# Patient Record
Sex: MALE | Race: WHITE | Employment: OTHER | URBAN - METROPOLITAN AREA
[De-identification: names, ages, dates, MRNs, and addresses within clinical notes are randomized per-mention and may not be internally consistent; named-entity substitution may affect disease eponyms.]

---

## 2020-07-26 ENCOUNTER — HOSPITAL ENCOUNTER (EMERGENCY)
Age: 75
Discharge: HOME | End: 2020-07-26
Attending: EMERGENCY MEDICINE
Payer: MEDICARE

## 2020-07-26 VITALS
HEART RATE: 92 BPM | SYSTOLIC BLOOD PRESSURE: 151 MMHG | RESPIRATION RATE: 18 BRPM | OXYGEN SATURATION: 96 % | TEMPERATURE: 98 F | DIASTOLIC BLOOD PRESSURE: 57 MMHG | WEIGHT: 254 LBS

## 2020-07-26 DIAGNOSIS — I15.9 SECONDARY HYPERTENSION: ICD-10-CM

## 2020-07-26 DIAGNOSIS — R73.9 HYPERGLYCEMIA: ICD-10-CM

## 2020-07-26 DIAGNOSIS — T78.3XXA ANGIOEDEMA, INITIAL ENCOUNTER: Primary | ICD-10-CM

## 2020-07-26 LAB
ALBUMIN SERPL-MCNC: 3.6 G/DL (ref 3.4–5)
ALBUMIN/GLOB SERPL: 0.8 {RATIO} (ref 1–2)
ALP LIVER SERPL-CCNC: 63 U/L (ref 45–117)
ALT SERPL-CCNC: 45 U/L (ref 16–61)
ANION GAP SERPL CALC-SCNC: 9 MMOL/L (ref 0–18)
AST SERPL-CCNC: 28 U/L (ref 15–37)
BASOPHILS # BLD AUTO: 0.04 X10(3) UL (ref 0–0.2)
BASOPHILS NFR BLD AUTO: 0.4 %
BILIRUB SERPL-MCNC: 0.8 MG/DL (ref 0.1–2)
BUN BLD-MCNC: 24 MG/DL (ref 7–18)
BUN/CREAT SERPL: 22.2 (ref 10–20)
CALCIUM BLD-MCNC: 9.2 MG/DL (ref 8.5–10.1)
CHLORIDE SERPL-SCNC: 105 MMOL/L (ref 98–112)
CO2 SERPL-SCNC: 22 MMOL/L (ref 21–32)
CREAT BLD-MCNC: 1.08 MG/DL (ref 0.7–1.3)
DEPRECATED RDW RBC AUTO: 46.9 FL (ref 35.1–46.3)
EOSINOPHIL # BLD AUTO: 0.2 X10(3) UL (ref 0–0.7)
EOSINOPHIL NFR BLD AUTO: 2 %
ERYTHROCYTE [DISTWIDTH] IN BLOOD BY AUTOMATED COUNT: 12.7 % (ref 11–15)
GLOBULIN PLAS-MCNC: 4.5 G/DL (ref 2.8–4.4)
GLUCOSE BLD-MCNC: 237 MG/DL (ref 70–99)
HCT VFR BLD AUTO: 43.4 % (ref 39–53)
HGB BLD-MCNC: 15.3 G/DL (ref 13–17.5)
IMM GRANULOCYTES # BLD AUTO: 0.01 X10(3) UL (ref 0–1)
IMM GRANULOCYTES NFR BLD: 0.1 %
LYMPHOCYTES # BLD AUTO: 2.94 X10(3) UL (ref 1–4)
LYMPHOCYTES NFR BLD AUTO: 29.4 %
M PROTEIN MFR SERPL ELPH: 8.1 G/DL (ref 6.4–8.2)
MCH RBC QN AUTO: 35 PG (ref 26–34)
MCHC RBC AUTO-ENTMCNC: 35.3 G/DL (ref 31–37)
MCV RBC AUTO: 99.3 FL (ref 80–100)
MONOCYTES # BLD AUTO: 0.92 X10(3) UL (ref 0.1–1)
MONOCYTES NFR BLD AUTO: 9.2 %
NEUTROPHILS # BLD AUTO: 5.88 X10 (3) UL (ref 1.5–7.7)
NEUTROPHILS # BLD AUTO: 5.88 X10(3) UL (ref 1.5–7.7)
NEUTROPHILS NFR BLD AUTO: 58.9 %
OSMOLALITY SERPL CALC.SUM OF ELEC: 294 MOSM/KG (ref 275–295)
PLATELET # BLD AUTO: 170 10(3)UL (ref 150–450)
POTASSIUM SERPL-SCNC: 3.7 MMOL/L (ref 3.5–5.1)
RBC # BLD AUTO: 4.37 X10(6)UL (ref 3.8–5.8)
SODIUM SERPL-SCNC: 136 MMOL/L (ref 136–145)
WBC # BLD AUTO: 10 X10(3) UL (ref 4–11)

## 2020-07-26 PROCEDURE — 93010 ELECTROCARDIOGRAM REPORT: CPT

## 2020-07-26 PROCEDURE — 93005 ELECTROCARDIOGRAM TRACING: CPT

## 2020-07-26 PROCEDURE — 99284 EMERGENCY DEPT VISIT MOD MDM: CPT

## 2020-07-26 PROCEDURE — 80053 COMPREHEN METABOLIC PANEL: CPT | Performed by: EMERGENCY MEDICINE

## 2020-07-26 PROCEDURE — 96375 TX/PRO/DX INJ NEW DRUG ADDON: CPT

## 2020-07-26 PROCEDURE — 85025 COMPLETE CBC W/AUTO DIFF WBC: CPT | Performed by: EMERGENCY MEDICINE

## 2020-07-26 PROCEDURE — 99285 EMERGENCY DEPT VISIT HI MDM: CPT

## 2020-07-26 PROCEDURE — 96374 THER/PROPH/DIAG INJ IV PUSH: CPT

## 2020-07-26 PROCEDURE — S0028 INJECTION, FAMOTIDINE, 20 MG: HCPCS | Performed by: EMERGENCY MEDICINE

## 2020-07-26 RX ORDER — EPINEPHRINE 0.3 MG/.3ML
0.3 INJECTION SUBCUTANEOUS
Qty: 1 EACH | Refills: 0 | Status: SHIPPED | OUTPATIENT
Start: 2020-07-26 | End: 2020-08-25

## 2020-07-26 RX ORDER — METHYLPREDNISOLONE SODIUM SUCCINATE 125 MG/2ML
125 INJECTION, POWDER, LYOPHILIZED, FOR SOLUTION INTRAMUSCULAR; INTRAVENOUS ONCE
Status: COMPLETED | OUTPATIENT
Start: 2020-07-26 | End: 2020-07-26

## 2020-07-26 RX ORDER — HYDRALAZINE HYDROCHLORIDE 20 MG/ML
10 INJECTION INTRAMUSCULAR; INTRAVENOUS ONCE
Status: COMPLETED | OUTPATIENT
Start: 2020-07-26 | End: 2020-07-26

## 2020-07-26 RX ORDER — PREDNISONE 20 MG/1
60 TABLET ORAL DAILY
Qty: 15 TABLET | Refills: 0 | Status: SHIPPED | OUTPATIENT
Start: 2020-07-26 | End: 2020-07-31

## 2020-07-26 RX ORDER — HYDRALAZINE HYDROCHLORIDE 25 MG/1
25 TABLET, FILM COATED ORAL 3 TIMES DAILY
Qty: 270 TABLET | Refills: 0 | Status: SHIPPED | OUTPATIENT
Start: 2020-07-26 | End: 2020-10-24

## 2020-07-26 RX ORDER — FAMOTIDINE 20 MG/1
20 TABLET ORAL 2 TIMES DAILY PRN
Qty: 30 TABLET | Refills: 0 | Status: SHIPPED | OUTPATIENT
Start: 2020-07-26 | End: 2020-08-25

## 2020-07-26 RX ORDER — DIPHENHYDRAMINE HYDROCHLORIDE 50 MG/ML
25 INJECTION INTRAMUSCULAR; INTRAVENOUS ONCE
Status: COMPLETED | OUTPATIENT
Start: 2020-07-26 | End: 2020-07-26

## 2020-07-26 RX ORDER — FAMOTIDINE 10 MG/ML
20 INJECTION, SOLUTION INTRAVENOUS ONCE
Status: COMPLETED | OUTPATIENT
Start: 2020-07-26 | End: 2020-07-26

## 2020-07-26 NOTE — ED PROVIDER NOTES
Patient Seen in: THE [facility] Emergency Department In [place]      History   Patient presents with:  Cellulitis    Stated Complaint: upper lip swelling since noon    HPI    This is a 50-year-old male who arrives here with complaints of lip swelling the symp 151/57   Pulse 92   Temp 98.1 °F (36.7 °C) (Tympanic)   Resp 18   Wt 115.2 kg   SpO2 96%         Physical Exam    General: . Patient is in no respiratory distress. He has no stridor no tongue swelling or lip swelling.   The patient is in no respiratory di orders. The patient was placed on monitors, IV was started, blood was drawn. MDM     The EKG shows normal sinus rhythm. There is no acute ST elevations or ischemic findings.   The rest of the EKG including rate rhythm axis and inte if it still persistently high to go to 3 times daily. I discussed with him that if his blood pressures are elevated then to use the metformin. I have also discussed this case with his own primary care physician Zulema Monteiro.   They will follow him up for cl

## 2020-07-26 NOTE — ED INITIAL ASSESSMENT (HPI)
Pt here c/o upper lip swelling since around 1100 today. States only ate a banana this morning and took morning meds. Pt talking without difficulty. Denies sore throat.

## 2020-07-27 LAB
ATRIAL RATE: 93 BPM
P AXIS: 51 DEGREES
P-R INTERVAL: 230 MS
Q-T INTERVAL: 380 MS
QRS DURATION: 100 MS
QTC CALCULATION (BEZET): 472 MS
R AXIS: -12 DEGREES
T AXIS: 32 DEGREES
VENTRICULAR RATE: 93 BPM

## 2021-09-15 ENCOUNTER — APPOINTMENT (OUTPATIENT)
Dept: CT IMAGING | Age: 76
DRG: 689 | End: 2021-09-15
Attending: EMERGENCY MEDICINE
Payer: MEDICARE

## 2021-09-15 ENCOUNTER — HOSPITAL ENCOUNTER (INPATIENT)
Facility: HOSPITAL | Age: 76
LOS: 1 days | Discharge: HOME | DRG: 689 | End: 2021-09-17
Attending: EMERGENCY MEDICINE | Admitting: INTERNAL MEDICINE
Payer: MEDICARE

## 2021-09-15 ENCOUNTER — HOSPITAL ENCOUNTER (EMERGENCY)
Facility: HOSPITAL | Age: 76
Discharge: LEFT BEFORE DISCHARGE | End: 2021-09-15
Payer: MEDICARE

## 2021-09-15 DIAGNOSIS — R41.82 ALTERED MENTAL STATUS, UNSPECIFIED ALTERED MENTAL STATUS TYPE: ICD-10-CM

## 2021-09-15 DIAGNOSIS — N39.0 URINARY TRACT INFECTION WITHOUT HEMATURIA, SITE UNSPECIFIED: Primary | ICD-10-CM

## 2021-09-15 PROBLEM — R79.89 AZOTEMIA: Status: ACTIVE | Noted: 2021-09-15

## 2021-09-15 PROBLEM — E87.1 HYPONATREMIA: Status: ACTIVE | Noted: 2021-09-15

## 2021-09-15 PROBLEM — R73.9 HYPERGLYCEMIA: Status: ACTIVE | Noted: 2021-09-15

## 2021-09-15 LAB
ALBUMIN SERPL-MCNC: 3.8 G/DL (ref 3.4–5)
ALBUMIN/GLOB SERPL: 0.7 {RATIO} (ref 1–2)
ALP LIVER SERPL-CCNC: 71 U/L
ALT SERPL-CCNC: 31 U/L
AMPHET UR QL SCN: NEGATIVE
ANION GAP SERPL CALC-SCNC: 7 MMOL/L (ref 0–18)
APAP SERPL-MCNC: <2 UG/ML (ref 10–30)
AST SERPL-CCNC: 28 U/L (ref 15–37)
BASOPHILS # BLD AUTO: 0.05 X10(3) UL (ref 0–0.2)
BASOPHILS NFR BLD AUTO: 0.4 %
BENZODIAZ UR QL SCN: NEGATIVE
BILIRUB SERPL-MCNC: 0.5 MG/DL (ref 0.1–2)
BILIRUB UR QL STRIP.AUTO: NEGATIVE
BUN BLD-MCNC: 28 MG/DL (ref 7–18)
CALCIUM BLD-MCNC: 9.6 MG/DL (ref 8.5–10.1)
CANNABINOIDS UR QL SCN: NEGATIVE
CHLORIDE SERPL-SCNC: 102 MMOL/L (ref 98–112)
CO2 SERPL-SCNC: 26 MMOL/L (ref 21–32)
COCAINE UR QL: NEGATIVE
COLOR UR AUTO: YELLOW
CREAT BLD-MCNC: 1.2 MG/DL
CREAT UR-SCNC: 141 MG/DL
EOSINOPHIL # BLD AUTO: 0.18 X10(3) UL (ref 0–0.7)
EOSINOPHIL NFR BLD AUTO: 1.3 %
ERYTHROCYTE [DISTWIDTH] IN BLOOD BY AUTOMATED COUNT: 12.5 %
ETHANOL SERPL-MCNC: <3 MG/DL (ref ?–3)
GLOBULIN PLAS-MCNC: 5.1 G/DL (ref 2.8–4.4)
GLUCOSE BLD-MCNC: 158 MG/DL (ref 70–99)
GLUCOSE UR STRIP.AUTO-MCNC: NEGATIVE MG/DL
HCT VFR BLD AUTO: 44.2 %
HGB BLD-MCNC: 15 G/DL
IMM GRANULOCYTES # BLD AUTO: 0.03 X10(3) UL (ref 0–1)
IMM GRANULOCYTES NFR BLD: 0.2 %
KETONES UR STRIP.AUTO-MCNC: 15 MG/DL
LYMPHOCYTES # BLD AUTO: 3.8 X10(3) UL (ref 1–4)
LYMPHOCYTES NFR BLD AUTO: 28.4 %
MCH RBC QN AUTO: 33.4 PG (ref 26–34)
MCHC RBC AUTO-ENTMCNC: 33.9 G/DL (ref 31–37)
MCV RBC AUTO: 98.4 FL
MDMA UR QL SCN: NEGATIVE
MONOCYTES # BLD AUTO: 1.11 X10(3) UL (ref 0.1–1)
MONOCYTES NFR BLD AUTO: 8.3 %
NEUTROPHILS # BLD AUTO: 8.23 X10 (3) UL (ref 1.5–7.7)
NEUTROPHILS # BLD AUTO: 8.23 X10(3) UL (ref 1.5–7.7)
NEUTROPHILS NFR BLD AUTO: 61.4 %
NITRITE UR QL STRIP.AUTO: NEGATIVE
OPIATES UR QL SCN: NEGATIVE
OSMOLALITY SERPL CALC.SUM OF ELEC: 289 MOSM/KG (ref 275–295)
PH UR STRIP.AUTO: 5.5 [PH] (ref 5–8)
PLATELET # BLD AUTO: 274 10(3)UL (ref 150–450)
POTASSIUM SERPL-SCNC: 3.8 MMOL/L (ref 3.5–5.1)
PROT SERPL-MCNC: 8.9 G/DL (ref 6.4–8.2)
RBC # BLD AUTO: 4.49 X10(6)UL
SALICYLATES SERPL-MCNC: <1.7 MG/DL (ref 2.8–20)
SARS-COV-2 RNA RESP QL NAA+PROBE: NOT DETECTED
SODIUM SERPL-SCNC: 135 MMOL/L (ref 136–145)
SP GR UR STRIP.AUTO: 1.02 (ref 1–1.03)
TSI SER-ACNC: 1.23 MIU/ML (ref 0.36–3.74)
UROBILINOGEN UR STRIP.AUTO-MCNC: 0.2 MG/DL
WBC # BLD AUTO: 13.4 X10(3) UL (ref 4–11)
WBC #/AREA URNS AUTO: >50 /HPF

## 2021-09-15 PROCEDURE — 99223 1ST HOSP IP/OBS HIGH 75: CPT | Performed by: INTERNAL MEDICINE

## 2021-09-15 PROCEDURE — 70450 CT HEAD/BRAIN W/O DYE: CPT | Performed by: EMERGENCY MEDICINE

## 2021-09-15 RX ORDER — LABETALOL HYDROCHLORIDE 5 MG/ML
10 INJECTION, SOLUTION INTRAVENOUS ONCE
Status: COMPLETED | OUTPATIENT
Start: 2021-09-15 | End: 2021-09-15

## 2021-09-15 RX ORDER — LABETALOL HYDROCHLORIDE 5 MG/ML
15 INJECTION, SOLUTION INTRAVENOUS ONCE
Status: COMPLETED | OUTPATIENT
Start: 2021-09-15 | End: 2021-09-15

## 2021-09-15 NOTE — ED INITIAL ASSESSMENT (HPI)
Family states has been confused off and on for last three weeks. He is alert and oriented to name and date but confused on address and thinks he lives in [place] where he lived as a young man. Also complains of \"cat spying on him\" denies headache.  Wife

## 2021-09-16 ENCOUNTER — APPOINTMENT (OUTPATIENT)
Dept: CV DIAGNOSTICS | Facility: HOSPITAL | Age: 76
DRG: 689 | End: 2021-09-16
Attending: INTERNAL MEDICINE
Payer: MEDICARE

## 2021-09-16 LAB
ANION GAP SERPL CALC-SCNC: 6 MMOL/L (ref 0–18)
BASOPHILS # BLD AUTO: 0.06 X10(3) UL (ref 0–0.2)
BASOPHILS NFR BLD AUTO: 0.4 %
BUN BLD-MCNC: 26 MG/DL (ref 7–18)
CALCIUM BLD-MCNC: 9.3 MG/DL (ref 8.5–10.1)
CHLORIDE SERPL-SCNC: 107 MMOL/L (ref 98–112)
CO2 SERPL-SCNC: 26 MMOL/L (ref 21–32)
CREAT BLD-MCNC: 1.45 MG/DL
EOSINOPHIL # BLD AUTO: 0.06 X10(3) UL (ref 0–0.7)
EOSINOPHIL NFR BLD AUTO: 0.4 %
ERYTHROCYTE [DISTWIDTH] IN BLOOD BY AUTOMATED COUNT: 12.4 %
EST. AVERAGE GLUCOSE BLD GHB EST-MCNC: 151 MG/DL (ref 68–126)
GLUCOSE BLD-MCNC: 128 MG/DL (ref 70–99)
GLUCOSE BLD-MCNC: 142 MG/DL (ref 70–99)
GLUCOSE BLD-MCNC: 149 MG/DL (ref 70–99)
GLUCOSE BLD-MCNC: 149 MG/DL (ref 70–99)
GLUCOSE BLD-MCNC: 172 MG/DL (ref 70–99)
GLUCOSE BLD-MCNC: 193 MG/DL (ref 70–99)
HBA1C MFR BLD HPLC: 6.9 % (ref ?–5.7)
HCT VFR BLD AUTO: 38.4 %
HGB BLD-MCNC: 13.1 G/DL
IMM GRANULOCYTES # BLD AUTO: 0.06 X10(3) UL (ref 0–1)
IMM GRANULOCYTES NFR BLD: 0.4 %
LYMPHOCYTES # BLD AUTO: 1.79 X10(3) UL (ref 1–4)
LYMPHOCYTES NFR BLD AUTO: 12.3 %
MCH RBC QN AUTO: 33.5 PG (ref 26–34)
MCHC RBC AUTO-ENTMCNC: 34.1 G/DL (ref 31–37)
MCV RBC AUTO: 98.2 FL
MONOCYTES # BLD AUTO: 1 X10(3) UL (ref 0.1–1)
MONOCYTES NFR BLD AUTO: 6.9 %
NEUTROPHILS # BLD AUTO: 11.55 X10 (3) UL (ref 1.5–7.7)
NEUTROPHILS # BLD AUTO: 11.55 X10(3) UL (ref 1.5–7.7)
NEUTROPHILS NFR BLD AUTO: 79.6 %
OSMOLALITY SERPL CALC.SUM OF ELEC: 297 MOSM/KG (ref 275–295)
PLATELET # BLD AUTO: 263 10(3)UL (ref 150–450)
POTASSIUM SERPL-SCNC: 3.7 MMOL/L (ref 3.5–5.1)
RBC # BLD AUTO: 3.91 X10(6)UL
SODIUM SERPL-SCNC: 139 MMOL/L (ref 136–145)
WBC # BLD AUTO: 14.5 X10(3) UL (ref 4–11)

## 2021-09-16 PROCEDURE — 93306 TTE W/DOPPLER COMPLETE: CPT | Performed by: INTERNAL MEDICINE

## 2021-09-16 PROCEDURE — 99232 SBSQ HOSP IP/OBS MODERATE 35: CPT | Performed by: HOSPITALIST

## 2021-09-16 RX ORDER — LORAZEPAM 1 MG/1
1 TABLET ORAL
Status: DISCONTINUED | OUTPATIENT
Start: 2021-09-16 | End: 2021-09-17

## 2021-09-16 RX ORDER — METOCLOPRAMIDE HYDROCHLORIDE 5 MG/ML
10 INJECTION INTRAMUSCULAR; INTRAVENOUS EVERY 8 HOURS PRN
Status: DISCONTINUED | OUTPATIENT
Start: 2021-09-16 | End: 2021-09-17

## 2021-09-16 RX ORDER — ACETAMINOPHEN 325 MG/1
650 TABLET ORAL EVERY 6 HOURS PRN
Status: DISCONTINUED | OUTPATIENT
Start: 2021-09-16 | End: 2021-09-17

## 2021-09-16 RX ORDER — LISINOPRIL 10 MG/1
10 TABLET ORAL DAILY
Status: DISCONTINUED | OUTPATIENT
Start: 2021-09-16 | End: 2021-09-17

## 2021-09-16 RX ORDER — MELATONIN
3 NIGHTLY PRN
Status: DISCONTINUED | OUTPATIENT
Start: 2021-09-16 | End: 2021-09-17

## 2021-09-16 RX ORDER — ONDANSETRON 2 MG/ML
4 INJECTION INTRAMUSCULAR; INTRAVENOUS EVERY 6 HOURS PRN
Status: DISCONTINUED | OUTPATIENT
Start: 2021-09-16 | End: 2021-09-17

## 2021-09-16 RX ORDER — FOLIC ACID 1 MG/1
1 TABLET ORAL DAILY
Status: DISCONTINUED | OUTPATIENT
Start: 2021-09-16 | End: 2021-09-17

## 2021-09-16 RX ORDER — POLYETHYLENE GLYCOL 3350 17 G/17G
17 POWDER, FOR SOLUTION ORAL DAILY PRN
Status: DISCONTINUED | OUTPATIENT
Start: 2021-09-16 | End: 2021-09-17

## 2021-09-16 RX ORDER — LORAZEPAM 2 MG/ML
1 INJECTION INTRAMUSCULAR
Status: DISCONTINUED | OUTPATIENT
Start: 2021-09-16 | End: 2021-09-17

## 2021-09-16 RX ORDER — LORAZEPAM 1 MG/1
2 TABLET ORAL
Status: DISCONTINUED | OUTPATIENT
Start: 2021-09-16 | End: 2021-09-17

## 2021-09-16 RX ORDER — HYDRALAZINE HYDROCHLORIDE 20 MG/ML
10 INJECTION INTRAMUSCULAR; INTRAVENOUS EVERY 4 HOURS PRN
Status: DISCONTINUED | OUTPATIENT
Start: 2021-09-16 | End: 2021-09-17

## 2021-09-16 RX ORDER — MELATONIN
100 DAILY
Status: DISCONTINUED | OUTPATIENT
Start: 2021-09-17 | End: 2021-09-17

## 2021-09-16 RX ORDER — MULTIPLE VITAMINS W/ MINERALS TAB 9MG-400MCG
1 TAB ORAL DAILY
Status: DISCONTINUED | OUTPATIENT
Start: 2021-09-16 | End: 2021-09-17

## 2021-09-16 RX ORDER — LORAZEPAM 2 MG/ML
2 INJECTION INTRAMUSCULAR
Status: DISCONTINUED | OUTPATIENT
Start: 2021-09-16 | End: 2021-09-17

## 2021-09-16 RX ORDER — DEXTROSE MONOHYDRATE 25 G/50ML
50 INJECTION, SOLUTION INTRAVENOUS
Status: DISCONTINUED | OUTPATIENT
Start: 2021-09-16 | End: 2021-09-17

## 2021-09-16 RX ORDER — ENOXAPARIN SODIUM 100 MG/ML
40 INJECTION SUBCUTANEOUS DAILY
Status: DISCONTINUED | OUTPATIENT
Start: 2021-09-16 | End: 2021-09-17

## 2021-09-16 RX ORDER — LABETALOL HYDROCHLORIDE 5 MG/ML
20 INJECTION, SOLUTION INTRAVENOUS EVERY 4 HOURS PRN
Status: DISCONTINUED | OUTPATIENT
Start: 2021-09-16 | End: 2021-09-17

## 2021-09-16 NOTE — CM/SW NOTE
MSW, Domonique Figueredo and RN discussed patient's post d/c needs in care rounds. No identified needs at this time, MSW will remain available should needs change.     POC: CIWA  Supportive son

## 2021-09-16 NOTE — H&P
[facility]ITAL[facility]  History and Physical     Julio César Zapata Patient Status:  Observation    1945 MRN [id number]   [facility] 3NE-A Attending Chiqui Rico DO;Rick,*   Hosp Day # 0 PCP Brain Holloway DO     Chief Complaint: AMS    His acute distress. Awake and alert  HEENT: Normocephalic atraumatic. Moist mucous membranes. EOM-I. PERRLA. Anicteric. Neck: Trachea midline. No JVD. No carotid bruits. Respiratory: Clear to auscultation bilaterally. No wheezes. No rhonchi.   Cardiovascular: daily  2. PRN hydralazine and labetalol  4. Murmur  1. Check 2D echo  5. DM type II  1. Check A1c  2. ISS  6. Mild hyponatremia  1. Monitor  7. ETOH abuse per family  1. Last drink unknown  2. Urine tox screen  3. ETOH level  4.  CIWA for now    Quality:  ·

## 2021-09-16 NOTE — PROGRESS NOTES
[facility]     Hospitalist Progress Note     Juana Mckeon Patient Status:  Inpatient    1945 MRN [id number]   [facility] 3NE-A Attending Ria Singer MD   Hosp Day # 0 PCP Blade Nance DO     Chief Complaint: confusion    Sub Markers  No results for input(s): CRP, ANITA, LDH, DDIMER in the last 168 hours. Imaging: Imaging data reviewed in Epic.     Medications:   • lisinopril  10 mg Oral Daily   • cefTRIAXone  1 g Intravenous Q24H   • Insulin Aspart Pen  1-5 Units Subcutaneous

## 2021-09-16 NOTE — PLAN OF CARE
Alert to self, vitals stable, on room air, no SOB, NSR, ST on tele, afibrile, denies having pain. CIWA protocol, PO avitan given per MAR. Gets agitated at times, demands the staff to take of the equipment, redirections provided.  Family is at the bedsid and sodium.  Initiate appropriate interventions as ordered  Outcome: Progressing     Problem: RISK FOR INFECTION - ADULT  Goal: Absence of fever/infection during anticipated neutropenic period  Description: INTERVENTIONS  - Monitor WBC  - Administer growth

## 2021-09-16 NOTE — PLAN OF CARE
PT ALERT, ORIENTED TO SELF, DISORIENTED TO SITUATION, PLACE, RESTLESS, AGITATED AT TIMES RAMBLING, PARANOID, 98% ON RA, /102, DR. BLACKBURN AWARE, MEDS ORDERED, GIVEN HYDRALAZINE, LISINOPRIL, 1 HOUR LATER, BP-184/83, GIVEN LABETALOL, BP - 102/52, VOIDED

## 2021-09-16 NOTE — BH PROGRESS NOTE
Drake , Adria Machado was going to see the pt but not able to due to ams per his nurse. Teresa Lima or Alejandra will check on him another time.

## 2021-09-16 NOTE — ED PROVIDER NOTES
Patient Seen in: THE [facility] Emergency Department In [place]      History   Patient presents with:  Altered Mental Status    Stated Complaint: CONFUSION, PARANOIA,     Subjective:   HPI    25-year-old man from home history of diabetes hypertension, here fo Temp 97.3 °F (36.3 °C) (Temporal)   Resp 17   Ht 172.7 cm (5' 8\")   Wt 106.6 kg   SpO2 98%   BMI 35.73 kg/m²         Physical Exam        Physical Exam  Vitals signs and nursing note reviewed.    General: Older gentleman sitting in the bed no acute distres components:    Salicylate <5.1 (*)     All other components within normal limits   CBC W/ DIFFERENTIAL - Abnormal; Notable for the following components:    WBC 13.4 (*)     Neutrophil Absolute Prelim 8.23 (*)     Neutrophil Absolute 8.23 (*)     Monocyte A are no abnormal extraaxial fluid collections. There is no midline shift. There are no acute appearing intraparenchymal brain abnormalities. There is nothing specific for acute territorial infarct. There is no hemorrhage or mass lesion.   SINUSES:  There

## 2021-09-17 VITALS
SYSTOLIC BLOOD PRESSURE: 152 MMHG | HEIGHT: 68 IN | OXYGEN SATURATION: 98 % | BODY MASS INDEX: 32.53 KG/M2 | RESPIRATION RATE: 18 BRPM | HEART RATE: 84 BPM | WEIGHT: 214.63 LBS | DIASTOLIC BLOOD PRESSURE: 64 MMHG | TEMPERATURE: 98 F

## 2021-09-17 LAB
ATRIAL RATE: 98 BPM
GLUCOSE BLD-MCNC: 126 MG/DL (ref 70–99)
GLUCOSE BLD-MCNC: 149 MG/DL (ref 70–99)
P AXIS: 58 DEGREES
P-R INTERVAL: 224 MS
Q-T INTERVAL: 364 MS
QRS DURATION: 92 MS
QTC CALCULATION (BEZET): 464 MS
R AXIS: -12 DEGREES
T AXIS: 93 DEGREES
VENTRICULAR RATE: 98 BPM

## 2021-09-17 PROCEDURE — 99239 HOSP IP/OBS DSCHRG MGMT >30: CPT | Performed by: HOSPITALIST

## 2021-09-17 RX ORDER — CEPHALEXIN 500 MG/1
500 CAPSULE ORAL 4 TIMES DAILY
Qty: 20 CAPSULE | Refills: 0 | Status: SHIPPED | OUTPATIENT
Start: 2021-09-17 | End: 2021-09-17

## 2021-09-17 RX ORDER — CEPHALEXIN 500 MG/1
500 CAPSULE ORAL 3 TIMES DAILY
Qty: 15 CAPSULE | Refills: 0 | Status: SHIPPED | OUTPATIENT
Start: 2021-09-17 | End: 2021-09-22

## 2021-09-17 RX ORDER — LISINOPRIL 10 MG/1
10 TABLET ORAL DAILY
Qty: 30 TABLET | Refills: 0 | Status: SHIPPED | OUTPATIENT
Start: 2021-09-18 | End: 2021-10-18

## 2021-09-17 NOTE — PLAN OF CARE
Patient oriented x2-3, forgetful   Able to communicate needs   Placed o2 2lnc for low sats when asleep   sr on tele  On CIWA protocol   Safety precautions in place  Call light is in reach     Problem: Patient/Family Goals  Goal: Patient/Family Long Term Go

## 2021-09-17 NOTE — PROGRESS NOTES
[facility]     Hospitalist Progress Note     Yohan Pac Patient Status:  Inpatient    1945 MRN [id number]   [facility] 3NE-A Attending Fely Martin MD   Hosp Day # 1 PCP William Barrientos DO     Chief Complaint: confusion    Sub Epic.    Medications:   • lisinopril  10 mg Oral Daily   • cefTRIAXone  1 g Intravenous Q24H   • Insulin Aspart Pen  1-5 Units Subcutaneous TID CC and HS   • enoxaparin  40 mg Subcutaneous Daily   • thiamine  100 mg Oral Daily   • multivitamin with mineral

## 2021-09-17 NOTE — CM/SW NOTE
CM received a call from RN stating that daughter that is out of state is disputing discharge. CM called son that is here to  patient. Son is now questioning if he should take pt home. RN to call Dr. Es Ferrera to discuss discharge planning.  If MD is st

## 2021-09-17 NOTE — PHYSICAL THERAPY NOTE
PHYSICAL THERAPY QUICK EVALUATION - INPATIENT    Room Number: 0632/3598-M  Evaluation Date: 9/17/2021  Presenting Problem: UTI  Physician Order: PT Eval and Treat     RAPID-SARS (-) 9/15/221    CT of brain 9/15/21-CONCLUSION:  Atrophy.   No acute bleed or patient currently have. ..  -   Turning over in bed (including adjusting bedclothes, sheets and blankets)?: None   -   Sitting down on and standing up from a chair with arms (e.g., wheelchair, bedside commode, etc.): None   -   Moving from lying on back to and ETOH abused. Functional outcome measures completed include The AM-PAC '6-Clicks' Inpatient Basic Mobility Short Form was completed and this patient is demonstrating a 28.97% degree of impairment in mobility.  Based on this evaluation, patient's clinica

## 2021-09-17 NOTE — CDS QUERY
Confusion/Delirium Without History of Injury  CLINICAL Brigitte [place]  Dear Doctor:  Clinical information (provided below) indicates confusion and/or delirium without history of injury.  For accurate ICD-10-CM code assignment to reflect se

## 2021-09-17 NOTE — BH PROGRESS NOTE
Josi Rob met with pt to discuss alcohol use and possible treatment options. Pt denies \"drinking problem\" and states that he is not interested in discussion or options regarding treatment.       note scanned into p

## 2021-09-17 NOTE — PROGRESS NOTES
This RN spoke with patient's daughter Beth Barreto regarding patient's discharge as she felt her father is being discharged too early due to his confusion s/t UTI.  This RN explained to the daughter that elderly patients diagnosed with UTI may experience symptpms

## 2021-09-17 NOTE — OCCUPATIONAL THERAPY NOTE
OCCUPATIONAL THERAPY QUICK EVALUATION - INPATIENT    Room Number: 8487/9277-U  Evaluation Date: 9/17/2021     Type of Evaluation: Quick Eval  Presenting Problem: UTI, AMS    Physician Order: IP Consult to Occupational Therapy  Reason for Therapy:  ADL/IADL ASSESSMENT  AM-PAC ‘6-Clicks’ Inpatient Daily Activity Short Form   How much help from another person does the patient currently need…  -   Putting on and taking off regular lower body clothing?: None   -   Bathing (including washing, rinsing, drying)?: A LOW  1 - 3 performance deficits    Client Assessment/Performance Deficits  MODERATE - Comorbidities and min to mod modifications of tasks    Clinical Decision Making  LOW - Analysis of occupational profile, problem-focused assessments, limited treatment op

## 2021-09-17 NOTE — PLAN OF CARE
A/O x 2-3, confused at times, vitals stable, no SOB  NSR on tele, afebrile, denies having pain   No behavioral concerns, the patient is cooperative with care today. PT/OT eval completed, see notes.   CIWA protocol, seizure precautions  POC updates discuss of fever/infection during anticipated neutropenic period  Description: INTERVENTIONS  - Monitor WBC  - Administer growth factors as ordered  - Implement neutropenic guidelines  Outcome: Progressing     Problem: SAFETY ADULT - FALL  Goal: Free from fall inj Administer ordered medications to maintain glucose within target range  - Assess barriers to adequate nutritional intake and initiate nutrition consult as needed  - Instruct patient on self management of diabetes  Outcome: Progressing

## 2021-09-17 NOTE — PAYOR COMM NOTE
STARTED OUT OBSERVATION ON 9/15  MADE INPT 9/17    ADMISSION REVIEW     Payor: Orlin Walter #:  [id number]  Authorization Number: [id number]    Admit date: 9/16/21  Admit time:  2:29 PM       REVIEW DOCUMENTATION:     ED Provider Notes      E Used    Alcohol use: Yes      Comment: every day    Drug use: Never             Review of Systems    Positive for stated complaint: CONFUSION, PARANOIA,   Other systems are as noted in HPI. Constitutional and vital signs reviewed.       All other systems r within normal limits   COMP METABOLIC PANEL (14) - Abnormal; Notable for the following components:    Glucose 158 (*)     Sodium 135 (*)     BUN 28 (*)     GFR, Non- 58 (*)     Total Protein 8.9 (*)     Globulin  5.1 (*)     A/G Ratio 0.7 ( PARANOIA,  TECHNIQUE:  Noncontrast CT scanning is performed through the brain. Dose reduction techniques were used.  Dose information is transmitted to the [facility]) Lorena [address] ([address]) which includes the Dose I specified.         Medications Prescribed:  Current Discharge Medication List                          Hospital Problems             Present on Admission  Date Reviewed: 9/15/2021          ICD-10-CM Noted POA    * (Principal) Urinary tract infection without quit smoking about 31 years ago. His smoking use included cigarettes. He has never used smokeless tobacco. He reports current alcohol use. He reports that he does not use drugs. Family History: Reviewed. No pertinent family history.     Allergies: No Kno on SCr of 1.2 mg/dL). No results for input(s): PTP, INR in the last 168 hours.     COVID-19 Lab Results  COVID-19  Lab Results   Component Value Date    COVID19 Not Detected 09/15/2021     Pro-Calcitonin  No results for input(s): PCT in the last 168 hour Oral Kojo Tejeda RN      LORazepam (ATIVAN) tab 1 mg     Date Action Dose Route User    9/16/2021 1430 Given  Oral Kojo Tejeda RN    9/16/2021 1152 Given  Oral Jaime Lorenzo RN      LORazepam (ATIVAN) injection 1 mg     Date Action Dose 09/16/21 0400 — 72 — 93/52 — — — — BK    09/16/21 0400 97 °F (36.1 °C) — 19 — 96 % — None (Room air) — JQ    09/16/21 0330 — 77 — 102/52 — — — — BK    09/16/21 0300 — 59 — 89/51 — — — — BK    09/16/21 0230 — 70 — 90/55 — — — — BK    09/16/21 0200 — 81 24 1 (36.6 °C)-98.9 °F (37.2 °C)] 98.3 °F (36.8 °C)  Pulse:  [70-86] 76  Resp:  [15-20] 18  BP: (123-147)/(52-80) 147/66     Physical Exam:    General: No acute distress. Respiratory: Clear to auscultation bilaterally. No wheezes. No rhonchi.   Cardiovascular:

## 2021-09-18 NOTE — PROGRESS NOTES
This writer spoke to the patient's son Nathalie Danielle that is present at the patient's bedside now. The son wanted to see his father face to face before taking him home and is agreeable to patient's discharge now. Discharge paperwork given to the son.  The son Phill Penny

## 2021-09-19 NOTE — DISCHARGE SUMMARY
[facility]  DISCHARGE SUMMARY     [facility] Patient Status:  Inpatient    1945 MRN [id number]   [facility] 3NE-A Attending No att. providers found   Hosp Day # 1 PCP Satya George DO     Date of Admission: 9/15/2021  Date o more lengthy hospitalization but patient improved faster than expected.                Procedures during hospitalization:   • none    Incidental or significant findings and recommendations (brief descriptions):  • none    Lab/Test [facility] at [facility] Musculoskeletal: Moves all extremities. Extremities: No edema.   -----------------------------------------------------------------------------------------------  PATIENT DISCHARGE INSTRUCTIONS: See electronic chart    Santo Camarena MD    Time spent:  >

## 2021-09-21 PROBLEM — R73.9 HYPERGLYCEMIA: Status: RESOLVED | Noted: 2021-09-15 | Resolved: 2021-09-21

## 2021-09-21 PROBLEM — I50.32 CHRONIC DIASTOLIC CHF (CONGESTIVE HEART FAILURE) (HCC): Status: ACTIVE | Noted: 2021-09-21

## 2021-09-21 PROBLEM — F10.20 UNCOMPLICATED ALCOHOL DEPENDENCE (HCC): Status: ACTIVE | Noted: 2021-09-21

## 2021-09-21 PROBLEM — G31.9 CEREBRAL ATROPHY (HCC): Status: ACTIVE | Noted: 2021-09-21

## 2021-09-21 PROBLEM — R41.82 ALTERED MENTAL STATUS, UNSPECIFIED ALTERED MENTAL STATUS TYPE: Status: RESOLVED | Noted: 2021-09-15 | Resolved: 2021-09-21

## 2021-09-21 PROBLEM — I15.2 HYPERTENSION ASSOCIATED WITH DIABETES (HCC): Status: ACTIVE | Noted: 2021-09-21

## 2021-09-21 PROBLEM — Z85.038 HISTORY OF COLON CANCER: Status: ACTIVE | Noted: 2021-09-21

## 2021-09-21 PROBLEM — E11.59 HYPERTENSION ASSOCIATED WITH DIABETES: Status: ACTIVE | Noted: 2021-09-21

## 2021-09-21 PROBLEM — I15.2 HYPERTENSION ASSOCIATED WITH DIABETES  (HCC): Status: ACTIVE | Noted: 2021-09-21

## 2021-09-21 PROBLEM — G31.9 CEREBRAL ATROPHY: Status: ACTIVE | Noted: 2021-09-21

## 2021-09-21 PROBLEM — I15.2 HYPERTENSION ASSOCIATED WITH DIABETES: Status: ACTIVE | Noted: 2021-09-21

## 2021-09-21 PROBLEM — E11.59 HYPERTENSION ASSOCIATED WITH DIABETES (HCC): Status: ACTIVE | Noted: 2021-09-21

## 2021-09-21 PROBLEM — N39.0 URINARY TRACT INFECTION WITHOUT HEMATURIA, SITE UNSPECIFIED: Status: RESOLVED | Noted: 2021-09-15 | Resolved: 2021-09-21

## 2021-09-21 PROBLEM — E11.59 HYPERTENSION ASSOCIATED WITH DIABETES  (HCC): Status: ACTIVE | Noted: 2021-09-21

## 2021-09-21 PROBLEM — Z90.49 HISTORY OF HEMICOLECTOMY: Status: ACTIVE | Noted: 2021-09-21

## 2021-09-21 PROBLEM — E87.1 HYPONATREMIA: Status: RESOLVED | Noted: 2021-09-15 | Resolved: 2021-09-21

## 2021-09-21 NOTE — PAYOR COMM NOTE
--------------  DISCHARGE REVIEW    Payor: Jayashree Lee #:  [id number]  Authorization Number: [id number]    Admit date: 9/16/21  Admit time:   2:29 PM  Discharge Date: 9/17/2021  7:53 PM     Admitting Physician: DO Michael Muñoz P antibiotics. His encephalopathy and paranoia improved. He probably had underlying dementia and was to have outpatient neuropsych evaluation. He remained stable and was restarted on his antihypertensives with good response.   He was discharged home in [place] your prescriptions at the location directed by your doctor or nurse    Bring a paper prescription for each of these medications  · metFORMIN 500 MG Tabs         [place]    Follow-up appointment:   No follow-up provider specified.   Appointments

## 2021-11-04 PROBLEM — I10 PRIMARY HYPERTENSION: Status: ACTIVE | Noted: 2021-09-21

## 2021-11-04 PROBLEM — S12.100A CLOSED ODONTOID FRACTURE, INITIAL ENCOUNTER (HCC): Status: ACTIVE | Noted: 2021-11-04

## 2021-11-04 PROBLEM — F05 DELIRIUM DUE TO ANOTHER MEDICAL CONDITION, ACUTE, HYPERACTIVE: Status: ACTIVE | Noted: 2021-11-04

## 2021-11-04 PROBLEM — R41.89 ALTERATION IN COGNITION: Status: ACTIVE | Noted: 2021-11-04

## 2021-11-04 PROBLEM — G31.84 MILD COGNITIVE IMPAIRMENT: Status: ACTIVE | Noted: 2021-11-04

## 2022-02-17 PROBLEM — F05 DELIRIUM DUE TO MEDICAL CONDITION WITH BEHAVIORAL DISTURBANCE: Status: ACTIVE | Noted: 2021-11-04

## 2022-03-09 PROBLEM — E11.36 TYPE 2 DIABETES MELLITUS WITH DIABETIC CATARACT, WITHOUT LONG-TERM CURRENT USE OF INSULIN (HCC): Status: ACTIVE | Noted: 2022-03-09

## 2023-02-21 ENCOUNTER — HOSPITAL ENCOUNTER (OUTPATIENT)
Dept: LAB | Facility: HOSPITAL | Age: 78
Discharge: HOME | End: 2023-02-21
Attending: INTERNAL MEDICINE
Payer: MEDICARE

## 2023-02-21 LAB
ALBUMIN SERPL-MCNC: 4 G/DL (ref 3.4–5)
ALBUMIN/GLOB SERPL: 1 {RATIO} (ref 1–2)
ALP LIVER SERPL-CCNC: 78 U/L
ALT SERPL-CCNC: 25 U/L
ANION GAP SERPL CALC-SCNC: 6 MMOL/L (ref 0–18)
AST SERPL-CCNC: 25 U/L (ref 15–37)
BASOPHILS # BLD AUTO: 0.07 X10(3) UL (ref 0–0.2)
BASOPHILS NFR BLD AUTO: 1 %
BILIRUB SERPL-MCNC: 1.2 MG/DL (ref 0.1–2)
BUN BLD-MCNC: 20 MG/DL (ref 7–18)
CALCIUM BLD-MCNC: 10.2 MG/DL (ref 8.5–10.1)
CHLORIDE SERPL-SCNC: 109 MMOL/L (ref 98–112)
CHOLEST SERPL-MCNC: 148 MG/DL (ref ?–200)
CO2 SERPL-SCNC: 24 MMOL/L (ref 21–32)
CREAT BLD-MCNC: 1.25 MG/DL
EOSINOPHIL # BLD AUTO: 0.2 X10(3) UL (ref 0–0.7)
EOSINOPHIL NFR BLD AUTO: 2.8 %
ERYTHROCYTE [DISTWIDTH] IN BLOOD BY AUTOMATED COUNT: 14 %
FASTING PATIENT LIPID ANSWER: YES
FASTING STATUS PATIENT QL REPORTED: YES
GFR SERPLBLD BASED ON 1.73 SQ M-ARVRAT: 59 ML/MIN/1.73M2 (ref 60–?)
GLOBULIN PLAS-MCNC: 4.2 G/DL (ref 2.8–4.4)
GLUCOSE BLD-MCNC: 131 MG/DL (ref 70–99)
HCT VFR BLD AUTO: 46.9 %
HDLC SERPL-MCNC: 62 MG/DL (ref 40–59)
HGB BLD-MCNC: 16.2 G/DL
IMM GRANULOCYTES # BLD AUTO: 0.05 X10(3) UL (ref 0–1)
IMM GRANULOCYTES NFR BLD: 0.7 %
LDLC SERPL CALC-MCNC: 70 MG/DL (ref ?–100)
LYMPHOCYTES # BLD AUTO: 2.02 X10(3) UL (ref 1–4)
LYMPHOCYTES NFR BLD AUTO: 28.6 %
MCH RBC QN AUTO: 35.4 PG (ref 26–34)
MCHC RBC AUTO-ENTMCNC: 34.5 G/DL (ref 31–37)
MCV RBC AUTO: 102.6 FL
MONOCYTES # BLD AUTO: 0.66 X10(3) UL (ref 0.1–1)
MONOCYTES NFR BLD AUTO: 9.3 %
NEUTROPHILS # BLD AUTO: 4.07 X10 (3) UL (ref 1.5–7.7)
NEUTROPHILS # BLD AUTO: 4.07 X10(3) UL (ref 1.5–7.7)
NEUTROPHILS NFR BLD AUTO: 57.6 %
NONHDLC SERPL-MCNC: 86 MG/DL (ref ?–130)
NT-PROBNP SERPL-MCNC: ABNORMAL PG/ML (ref ?–450)
OSMOLALITY SERPL CALC.SUM OF ELEC: 292 MOSM/KG (ref 275–295)
PLATELET # BLD AUTO: 197 10(3)UL (ref 150–450)
POTASSIUM SERPL-SCNC: 4.5 MMOL/L (ref 3.5–5.1)
PROT SERPL-MCNC: 8.2 G/DL (ref 6.4–8.2)
RBC # BLD AUTO: 4.57 X10(6)UL
SODIUM SERPL-SCNC: 139 MMOL/L (ref 136–145)
TRIGL SERPL-MCNC: 85 MG/DL (ref 30–149)
TSI SER-ACNC: 2.19 MIU/ML (ref 0.36–3.74)
VLDLC SERPL CALC-MCNC: 13 MG/DL (ref 0–30)
WBC # BLD AUTO: 7.1 X10(3) UL (ref 4–11)

## 2023-02-21 PROCEDURE — 85025 COMPLETE CBC W/AUTO DIFF WBC: CPT | Performed by: INTERNAL MEDICINE

## 2023-02-21 PROCEDURE — 80061 LIPID PANEL: CPT | Performed by: INTERNAL MEDICINE

## 2023-02-21 PROCEDURE — 80053 COMPREHEN METABOLIC PANEL: CPT | Performed by: INTERNAL MEDICINE

## 2023-02-21 PROCEDURE — 83880 ASSAY OF NATRIURETIC PEPTIDE: CPT | Performed by: INTERNAL MEDICINE

## 2023-02-21 PROCEDURE — 36415 COLL VENOUS BLD VENIPUNCTURE: CPT | Performed by: INTERNAL MEDICINE

## 2023-02-21 PROCEDURE — 84443 ASSAY THYROID STIM HORMONE: CPT | Performed by: INTERNAL MEDICINE

## 2023-03-01 ENCOUNTER — APPOINTMENT (OUTPATIENT)
Dept: GENERAL RADIOLOGY | Facility: HOSPITAL | Age: 78
End: 2023-03-01
Attending: EMERGENCY MEDICINE
Payer: MEDICARE

## 2023-03-01 ENCOUNTER — HOSPITAL ENCOUNTER (INPATIENT)
Facility: HOSPITAL | Age: 78
LOS: 8 days | Discharge: HOME | End: 2023-03-09
Attending: EMERGENCY MEDICINE | Admitting: HOSPITALIST
Payer: MEDICARE

## 2023-03-01 DIAGNOSIS — I50.9 ACUTE ON CHRONIC CONGESTIVE HEART FAILURE, UNSPECIFIED HEART FAILURE TYPE (HCC): Primary | ICD-10-CM

## 2023-03-01 DIAGNOSIS — I10 PRIMARY HYPERTENSION: ICD-10-CM

## 2023-03-01 DIAGNOSIS — E11.36 TYPE 2 DIABETES MELLITUS WITH DIABETIC CATARACT, WITHOUT LONG-TERM CURRENT USE OF INSULIN (HCC): ICD-10-CM

## 2023-03-01 LAB
ALBUMIN SERPL-MCNC: 3.5 G/DL (ref 3.4–5)
ALBUMIN/GLOB SERPL: 0.9 {RATIO} (ref 1–2)
ALP LIVER SERPL-CCNC: 67 U/L
ALT SERPL-CCNC: 22 U/L
ANION GAP SERPL CALC-SCNC: 6 MMOL/L (ref 0–18)
AST SERPL-CCNC: 21 U/L (ref 15–37)
BASOPHILS # BLD AUTO: 0.05 X10(3) UL (ref 0–0.2)
BASOPHILS NFR BLD AUTO: 0.8 %
BILIRUB SERPL-MCNC: 0.8 MG/DL (ref 0.1–2)
BUN BLD-MCNC: 23 MG/DL (ref 7–18)
CALCIUM BLD-MCNC: 9.8 MG/DL (ref 8.5–10.1)
CHLORIDE SERPL-SCNC: 108 MMOL/L (ref 98–112)
CO2 SERPL-SCNC: 26 MMOL/L (ref 21–32)
CREAT BLD-MCNC: 1.22 MG/DL
EOSINOPHIL # BLD AUTO: 0.23 X10(3) UL (ref 0–0.7)
EOSINOPHIL NFR BLD AUTO: 3.5 %
ERYTHROCYTE [DISTWIDTH] IN BLOOD BY AUTOMATED COUNT: 13.7 %
EST. AVERAGE GLUCOSE BLD GHB EST-MCNC: 134 MG/DL (ref 68–126)
GFR SERPLBLD BASED ON 1.73 SQ M-ARVRAT: 61 ML/MIN/1.73M2 (ref 60–?)
GLOBULIN PLAS-MCNC: 4 G/DL (ref 2.8–4.4)
GLUCOSE BLD-MCNC: 121 MG/DL (ref 70–99)
GLUCOSE BLD-MCNC: 134 MG/DL (ref 70–99)
HBA1C MFR BLD: 6.3 % (ref ?–5.7)
HCT VFR BLD AUTO: 46 %
HGB BLD-MCNC: 15.4 G/DL
IMM GRANULOCYTES # BLD AUTO: 0.01 X10(3) UL (ref 0–1)
IMM GRANULOCYTES NFR BLD: 0.2 %
LYMPHOCYTES # BLD AUTO: 1.88 X10(3) UL (ref 1–4)
LYMPHOCYTES NFR BLD AUTO: 28.7 %
MCH RBC QN AUTO: 34.3 PG (ref 26–34)
MCHC RBC AUTO-ENTMCNC: 33.5 G/DL (ref 31–37)
MCV RBC AUTO: 102.4 FL
MONOCYTES # BLD AUTO: 0.69 X10(3) UL (ref 0.1–1)
MONOCYTES NFR BLD AUTO: 10.6 %
NEUTROPHILS # BLD AUTO: 3.68 X10 (3) UL (ref 1.5–7.7)
NEUTROPHILS # BLD AUTO: 3.68 X10(3) UL (ref 1.5–7.7)
NEUTROPHILS NFR BLD AUTO: 56.2 %
NT-PROBNP SERPL-MCNC: ABNORMAL PG/ML (ref ?–450)
OSMOLALITY SERPL CALC.SUM OF ELEC: 296 MOSM/KG (ref 275–295)
PLATELET # BLD AUTO: 182 10(3)UL (ref 150–450)
POTASSIUM SERPL-SCNC: 4.2 MMOL/L (ref 3.5–5.1)
PROCALCITONIN SERPL-MCNC: <0.05 NG/ML (ref ?–0.16)
PROT SERPL-MCNC: 7.5 G/DL (ref 6.4–8.2)
RBC # BLD AUTO: 4.49 X10(6)UL
SARS-COV-2 RNA RESP QL NAA+PROBE: NOT DETECTED
SODIUM SERPL-SCNC: 140 MMOL/L (ref 136–145)
TROPONIN I HIGH SENSITIVITY: 23 NG/L
TROPONIN I HIGH SENSITIVITY: 34 NG/L
WBC # BLD AUTO: 6.5 X10(3) UL (ref 4–11)

## 2023-03-01 PROCEDURE — 83880 ASSAY OF NATRIURETIC PEPTIDE: CPT | Performed by: EMERGENCY MEDICINE

## 2023-03-01 PROCEDURE — 93010 ELECTROCARDIOGRAM REPORT: CPT

## 2023-03-01 PROCEDURE — 85025 COMPLETE CBC W/AUTO DIFF WBC: CPT | Performed by: EMERGENCY MEDICINE

## 2023-03-01 PROCEDURE — 83036 HEMOGLOBIN GLYCOSYLATED A1C: CPT | Performed by: HOSPITALIST

## 2023-03-01 PROCEDURE — 71045 X-RAY EXAM CHEST 1 VIEW: CPT | Performed by: EMERGENCY MEDICINE

## 2023-03-01 PROCEDURE — 99285 EMERGENCY DEPT VISIT HI MDM: CPT

## 2023-03-01 PROCEDURE — 93005 ELECTROCARDIOGRAM TRACING: CPT

## 2023-03-01 PROCEDURE — 84145 PROCALCITONIN (PCT): CPT | Performed by: EMERGENCY MEDICINE

## 2023-03-01 PROCEDURE — 82962 GLUCOSE BLOOD TEST: CPT

## 2023-03-01 PROCEDURE — 80053 COMPREHEN METABOLIC PANEL: CPT | Performed by: EMERGENCY MEDICINE

## 2023-03-01 PROCEDURE — 84484 ASSAY OF TROPONIN QUANT: CPT | Performed by: EMERGENCY MEDICINE

## 2023-03-01 PROCEDURE — 96374 THER/PROPH/DIAG INJ IV PUSH: CPT

## 2023-03-01 PROCEDURE — 84484 ASSAY OF TROPONIN QUANT: CPT | Performed by: HOSPITALIST

## 2023-03-01 RX ORDER — CARVEDILOL 3.12 MG/1
3.12 TABLET ORAL 2 TIMES DAILY WITH MEALS
Status: DISCONTINUED | OUTPATIENT
Start: 2023-03-01 | End: 2023-03-02

## 2023-03-01 RX ORDER — FUROSEMIDE 10 MG/ML
40 INJECTION INTRAMUSCULAR; INTRAVENOUS ONCE
Status: COMPLETED | OUTPATIENT
Start: 2023-03-01 | End: 2023-03-01

## 2023-03-01 RX ORDER — HYDRALAZINE HYDROCHLORIDE 25 MG/1
25 TABLET, FILM COATED ORAL DAILY
COMMUNITY
Start: 2023-02-21 | End: 2023-03-09

## 2023-03-01 RX ORDER — NICOTINE POLACRILEX 4 MG
15 LOZENGE BUCCAL
Status: DISCONTINUED | OUTPATIENT
Start: 2023-03-01 | End: 2023-03-09

## 2023-03-01 RX ORDER — ACETAMINOPHEN 500 MG
500 TABLET ORAL EVERY 4 HOURS PRN
Status: DISCONTINUED | OUTPATIENT
Start: 2023-03-01 | End: 2023-03-09

## 2023-03-01 RX ORDER — NICOTINE POLACRILEX 4 MG
30 LOZENGE BUCCAL
Status: DISCONTINUED | OUTPATIENT
Start: 2023-03-01 | End: 2023-03-09

## 2023-03-01 RX ORDER — DEXTROSE MONOHYDRATE 25 G/50ML
50 INJECTION, SOLUTION INTRAVENOUS
Status: DISCONTINUED | OUTPATIENT
Start: 2023-03-01 | End: 2023-03-09

## 2023-03-01 RX ORDER — METOCLOPRAMIDE HYDROCHLORIDE 5 MG/ML
10 INJECTION INTRAMUSCULAR; INTRAVENOUS EVERY 8 HOURS PRN
Status: DISCONTINUED | OUTPATIENT
Start: 2023-03-01 | End: 2023-03-09

## 2023-03-01 RX ORDER — HEPARIN SODIUM 5000 [USP'U]/ML
5000 INJECTION, SOLUTION INTRAVENOUS; SUBCUTANEOUS EVERY 8 HOURS SCHEDULED
Status: DISCONTINUED | OUTPATIENT
Start: 2023-03-01 | End: 2023-03-09

## 2023-03-01 RX ORDER — HYDRALAZINE HYDROCHLORIDE 25 MG/1
25 TABLET, FILM COATED ORAL 2 TIMES DAILY
Status: DISCONTINUED | OUTPATIENT
Start: 2023-03-01 | End: 2023-03-02

## 2023-03-01 RX ORDER — CARVEDILOL 3.12 MG/1
3.12 TABLET ORAL 2 TIMES DAILY
COMMUNITY
Start: 2023-02-21 | End: 2023-03-09

## 2023-03-01 RX ORDER — ACETAMINOPHEN 325 MG/1
325 TABLET ORAL EVERY 6 HOURS PRN
COMMUNITY

## 2023-03-01 RX ORDER — HYDRALAZINE HYDROCHLORIDE 20 MG/ML
10 INJECTION INTRAMUSCULAR; INTRAVENOUS
Status: DISCONTINUED | OUTPATIENT
Start: 2023-03-01 | End: 2023-03-01

## 2023-03-01 RX ORDER — FUROSEMIDE 10 MG/ML
40 INJECTION INTRAMUSCULAR; INTRAVENOUS
Status: DISCONTINUED | OUTPATIENT
Start: 2023-03-02 | End: 2023-03-02

## 2023-03-01 RX ORDER — ONDANSETRON 2 MG/ML
4 INJECTION INTRAMUSCULAR; INTRAVENOUS EVERY 6 HOURS PRN
Status: DISCONTINUED | OUTPATIENT
Start: 2023-03-01 | End: 2023-03-09

## 2023-03-01 NOTE — ED INITIAL ASSESSMENT (HPI)
Patient relates he was experiencing sub sternal chest tightness earlier today but it has now resolved. He relates he has difficulty breathing with activity. Denies dizziness. Denies N/V/D.

## 2023-03-02 ENCOUNTER — APPOINTMENT (OUTPATIENT)
Dept: CV DIAGNOSTICS | Facility: HOSPITAL | Age: 78
End: 2023-03-02
Attending: INTERNAL MEDICINE
Payer: MEDICARE

## 2023-03-02 ENCOUNTER — APPOINTMENT (OUTPATIENT)
Dept: INTERVENTIONAL RADIOLOGY/VASCULAR | Facility: HOSPITAL | Age: 78
End: 2023-03-02
Attending: INTERNAL MEDICINE
Payer: MEDICARE

## 2023-03-02 LAB
ALBUMIN SERPL-MCNC: 3.3 G/DL (ref 3.4–5)
ALBUMIN/GLOB SERPL: 0.9 {RATIO} (ref 1–2)
ALP LIVER SERPL-CCNC: 62 U/L
ALT SERPL-CCNC: 21 U/L
ANION GAP SERPL CALC-SCNC: 3 MMOL/L (ref 0–18)
AST SERPL-CCNC: 28 U/L (ref 15–37)
ATRIAL RATE: 89 BPM
BASOPHILS # BLD AUTO: 0.06 X10(3) UL (ref 0–0.2)
BASOPHILS NFR BLD AUTO: 1 %
BILIRUB SERPL-MCNC: 1 MG/DL (ref 0.1–2)
BUN BLD-MCNC: 19 MG/DL (ref 7–18)
CALCIUM BLD-MCNC: 9.4 MG/DL (ref 8.5–10.1)
CHLORIDE SERPL-SCNC: 110 MMOL/L (ref 98–112)
CO2 SERPL-SCNC: 26 MMOL/L (ref 21–32)
CREAT BLD-MCNC: 1.05 MG/DL
EOSINOPHIL # BLD AUTO: 0.27 X10(3) UL (ref 0–0.7)
EOSINOPHIL NFR BLD AUTO: 4.4 %
ERYTHROCYTE [DISTWIDTH] IN BLOOD BY AUTOMATED COUNT: 13.5 %
GFR SERPLBLD BASED ON 1.73 SQ M-ARVRAT: 73 ML/MIN/1.73M2 (ref 60–?)
GLOBULIN PLAS-MCNC: 3.7 G/DL (ref 2.8–4.4)
GLUCOSE BLD-MCNC: 102 MG/DL (ref 70–99)
GLUCOSE BLD-MCNC: 103 MG/DL (ref 70–99)
GLUCOSE BLD-MCNC: 123 MG/DL (ref 70–99)
GLUCOSE BLD-MCNC: 94 MG/DL (ref 70–99)
GLUCOSE BLD-MCNC: 99 MG/DL (ref 70–99)
HCT VFR BLD AUTO: 43.5 %
HGB BLD-MCNC: 14.6 G/DL
IMM GRANULOCYTES # BLD AUTO: 0.01 X10(3) UL (ref 0–1)
IMM GRANULOCYTES NFR BLD: 0.2 %
LYMPHOCYTES # BLD AUTO: 1.75 X10(3) UL (ref 1–4)
LYMPHOCYTES NFR BLD AUTO: 28.8 %
MAGNESIUM SERPL-MCNC: 1.8 MG/DL (ref 1.6–2.6)
MCH RBC QN AUTO: 34.8 PG (ref 26–34)
MCHC RBC AUTO-ENTMCNC: 33.6 G/DL (ref 31–37)
MCV RBC AUTO: 103.8 FL
MONOCYTES # BLD AUTO: 0.72 X10(3) UL (ref 0.1–1)
MONOCYTES NFR BLD AUTO: 11.9 %
NEUTROPHILS # BLD AUTO: 3.26 X10 (3) UL (ref 1.5–7.7)
NEUTROPHILS # BLD AUTO: 3.26 X10(3) UL (ref 1.5–7.7)
NEUTROPHILS NFR BLD AUTO: 53.7 %
OSMOLALITY SERPL CALC.SUM OF ELEC: 290 MOSM/KG (ref 275–295)
P AXIS: 51 DEGREES
P-R INTERVAL: 384 MS
PHOSPHATE SERPL-MCNC: 3.1 MG/DL (ref 2.5–4.9)
PLATELET # BLD AUTO: 165 10(3)UL (ref 150–450)
POTASSIUM SERPL-SCNC: 3.4 MMOL/L (ref 3.5–5.1)
PROT SERPL-MCNC: 7 G/DL (ref 6.4–8.2)
Q-T INTERVAL: 368 MS
QRS DURATION: 100 MS
QTC CALCULATION (BEZET): 447 MS
R AXIS: 35 DEGREES
RBC # BLD AUTO: 4.19 X10(6)UL
SODIUM SERPL-SCNC: 139 MMOL/L (ref 136–145)
T AXIS: 212 DEGREES
VENTRICULAR RATE: 89 BPM
WBC # BLD AUTO: 6.1 X10(3) UL (ref 4–11)

## 2023-03-02 PROCEDURE — 80053 COMPREHEN METABOLIC PANEL: CPT | Performed by: HOSPITALIST

## 2023-03-02 PROCEDURE — B2111ZZ FLUOROSCOPY OF MULTIPLE CORONARY ARTERIES USING LOW OSMOLAR CONTRAST: ICD-10-PCS | Performed by: INTERNAL MEDICINE

## 2023-03-02 PROCEDURE — 82962 GLUCOSE BLOOD TEST: CPT

## 2023-03-02 PROCEDURE — 85025 COMPLETE CBC W/AUTO DIFF WBC: CPT | Performed by: HOSPITALIST

## 2023-03-02 PROCEDURE — 99153 MOD SED SAME PHYS/QHP EA: CPT | Performed by: INTERNAL MEDICINE

## 2023-03-02 PROCEDURE — 93456 R HRT CORONARY ARTERY ANGIO: CPT | Performed by: INTERNAL MEDICINE

## 2023-03-02 PROCEDURE — 84100 ASSAY OF PHOSPHORUS: CPT | Performed by: HOSPITALIST

## 2023-03-02 PROCEDURE — 83735 ASSAY OF MAGNESIUM: CPT | Performed by: HOSPITALIST

## 2023-03-02 PROCEDURE — 4A023N6 MEASUREMENT OF CARDIAC SAMPLING AND PRESSURE, RIGHT HEART, PERCUTANEOUS APPROACH: ICD-10-PCS | Performed by: INTERNAL MEDICINE

## 2023-03-02 PROCEDURE — 99152 MOD SED SAME PHYS/QHP 5/>YRS: CPT | Performed by: INTERNAL MEDICINE

## 2023-03-02 RX ORDER — MAGNESIUM OXIDE 400 MG/1
400 TABLET ORAL ONCE
Status: COMPLETED | OUTPATIENT
Start: 2023-03-02 | End: 2023-03-02

## 2023-03-02 RX ORDER — FUROSEMIDE 10 MG/ML
40 INJECTION INTRAMUSCULAR; INTRAVENOUS 3 TIMES DAILY
Status: DISCONTINUED | OUTPATIENT
Start: 2023-03-02 | End: 2023-03-04

## 2023-03-02 RX ORDER — LIDOCAINE HYDROCHLORIDE 10 MG/ML
INJECTION, SOLUTION EPIDURAL; INFILTRATION; INTRACAUDAL; PERINEURAL
Status: COMPLETED
Start: 2023-03-02 | End: 2023-03-02

## 2023-03-02 RX ORDER — ASPIRIN 81 MG/1
324 TABLET, CHEWABLE ORAL ONCE
Status: COMPLETED | OUTPATIENT
Start: 2023-03-02 | End: 2023-03-02

## 2023-03-02 RX ORDER — HYDRALAZINE HYDROCHLORIDE 50 MG/1
50 TABLET, FILM COATED ORAL EVERY 8 HOURS SCHEDULED
Status: DISCONTINUED | OUTPATIENT
Start: 2023-03-02 | End: 2023-03-09

## 2023-03-02 RX ORDER — HEPARIN SODIUM 5000 [USP'U]/ML
INJECTION, SOLUTION INTRAVENOUS; SUBCUTANEOUS
Status: COMPLETED
Start: 2023-03-02 | End: 2023-03-02

## 2023-03-02 RX ORDER — ISOSORBIDE MONONITRATE 30 MG/1
30 TABLET, EXTENDED RELEASE ORAL DAILY
Status: DISCONTINUED | OUTPATIENT
Start: 2023-03-02 | End: 2023-03-04

## 2023-03-02 RX ORDER — MIDAZOLAM HYDROCHLORIDE 1 MG/ML
INJECTION INTRAMUSCULAR; INTRAVENOUS
Status: COMPLETED
Start: 2023-03-02 | End: 2023-03-02

## 2023-03-02 RX ORDER — SODIUM CHLORIDE 9 MG/ML
INJECTION, SOLUTION INTRAVENOUS
Status: DISCONTINUED | OUTPATIENT
Start: 2023-03-03 | End: 2023-03-02 | Stop reason: HOSPADM

## 2023-03-02 RX ORDER — POTASSIUM CHLORIDE 20 MEQ/1
40 TABLET, EXTENDED RELEASE ORAL EVERY 4 HOURS
Status: COMPLETED | OUTPATIENT
Start: 2023-03-02 | End: 2023-03-02

## 2023-03-02 RX ORDER — CARVEDILOL 6.25 MG/1
6.25 TABLET ORAL 2 TIMES DAILY WITH MEALS
Status: DISCONTINUED | OUTPATIENT
Start: 2023-03-02 | End: 2023-03-09

## 2023-03-02 NOTE — ED QUICK NOTES
Orders for admission, patient is aware of plan and ready to go upstairs. Any questions, please call ED RN Jorgito Mcghee at extension [phone].      Patient Covid vaccination status: Fully vaccinated     COVID Test Ordered in ED: Rapid SARS-CoV-2 by PCR    COVID Suspicion at Admission: Low clinical suspicion for COVID    Running Infusions:  None    Mental Status/LOC at time of transport: A&Ox3    Other pertinent information:   CIWA score: N/A   NIH score:  N/A

## 2023-03-02 NOTE — PROCEDURES
[facility]    Cardiac Cath Procedure Note    [facility] Patient Status:  Inpatient    1945 MRN [id number]   Location [address] Attending Huyen So MD   Hosp Day # 1 PCP None Pcp       Cardiologist: Kristian Joy MD  Primary Proceduralist: Kristian Joy MD  Procedure Performed: LHC and RHC  Date of Procedure: 3/2/2023   Indication: HFrEF    Summary of procedure: Multivessel coronary disease, CV surgery consult. Volume overloaded however preserved cardiac output. Assessment:  HFrEF: Ischemic cardiomyopathy secondary to multivessel coronary disease  CAD: Significant sequential LAD stenoses, diagonal stenoses, OM1 and OM 2 stenosis, diffuse circumflex stenoses. Chronically occluded 100% small RCA stenosis. Aortic stenosis: Mild to moderate on last check  Cardiac risk factors: Hypertension, hyperlipidemia, diabetes      Recommendations:  CAD: CV surgery consult, restart heparin in 2 hours without bolus    Cardiomyopathy: Increased Lasix to 3 times daily, add Imdur, titrate hydralazine, no Entresto due to anaphylaxis to lisinopril in the past    Aortic stenosis: Follow-up formal echocardiogram, progression of aortic stenosis may also be contributing to low EF        Left Ventriculography and hemodynamics: Aortic valve not crossed  RA 18  RV 62/15  PA 66/30 mean 45  PCW 25    CO 6.7/ CI 3.1  SVR 1079/ PVR 3.4 Bell    Coronary Angiography  RCA: Codominant but small vessel, 95% occluded in the proximal segment, 100% occluded in the midsegment. Fed by septal collaterals to the distal PDA which appears small. Left main:  Free of obstructive disease    Left anterior descending: Proximal LAD stenosis 90% which likely progresses proximally 15 to 20 mm. Mid LAD free of obstructive disease, large diagonal takeoff with 95% proximal diagonal stenosis. Mid/distal LAD with stenosis, 90 to 95%. Large apical LAD which wraps around to the inferior apex.       Circumflex: Diffusely diseased, 80 to 90% in the proximal segment, feeds inferolateral OM branches which each have 80-90% stenoses proximally. Left PDA nonobstructive. Description of Procedure:   After written informed consent was obtained from the patient, patient was brought to the cardiac catheterization laboratory. Patient was prepped and draped in the usual sterile fashion. Lidocaine 1% was used to infiltrate the right groin for local anesthesia and a 6 Romanian introducer sheath was inserted into the right femoral artery via ultrasound guidance and micropuncture kit. Right femoral vein accessed by palpation. 7 FR sheath placed. Monitoring swan advanced to RA and pressures recorded in RA, RV, PA and wedge positions under fluoroscopic and hemodynamic guidance. Selective coronary angiography performed with JR4 catheter for RCA and JL4 catheter for LCA. Angiography performed in standard projections. Selective right femoral angiogram done assess anatomy for closure. Specimen sent to: No specimen collected  Estimated blood loss: 10 cc  Closure:  Perclose artery, Mynx vein      IV was maintained by RN and moderate conscious sedation of versed and fentanyl was given. Patient was assessed and monitoring of oxygen, heart rate and blood pressure by nurse and myself during the exam 35 minutes.       Usman Dumont MD  03/02/23

## 2023-03-02 NOTE — PLAN OF CARE
Patient alert and oriented x 4. On RA, 2L NC at night. Up with SBA. NSR on tele. Continent of bowel/bladder. No complaints of pain, shortness of breath, chest pain/discomfort. POC: IV lasix BID, cards to see. Call light within reach. Fall precautions in place. 1430: patient returned from cath lab, R groin site clean/dry/intact, soft, no hematoma. R pedal pulse +1, no numbness/tingling in RLE    Problem: Patient/Family Goals  Goal: Patient/Family Long Term Goal  Description: Patient's Long Term Goal: Free from edema, chest pain and shortness of breath    Interventions:  - Diurese, cardiology to discuss plan with pt   - See additional Care Plan goals for specific interventions  Outcome: Progressing  Goal: Patient/Family Short Term Goal  Description: Patient's Short Term Goal: Go home free of chest pain and shortness of breath    Interventions:   - Cardiology to see for plan 3/2  - See additional Care Plan goals for specific interventions  Outcome: Progressing     Problem: SAFETY ADULT - FALL  Goal: Free from fall injury  Description: INTERVENTIONS:  - Assess pt frequently for physical needs  - Identify cognitive and physical deficits and behaviors that affect risk of falls.   - [place] fall precautions as indicated by assessment.  - Educate pt/family on patient safety including physical limitations  - Instruct pt to call for assistance with activity based on assessment  - Modify environment to reduce risk of injury  - Provide assistive devices as appropriate  - Consider OT/PT consult to assist with strengthening/mobility  - Encourage toileting schedule  Outcome: Progressing

## 2023-03-03 ENCOUNTER — APPOINTMENT (OUTPATIENT)
Dept: CV DIAGNOSTICS | Facility: HOSPITAL | Age: 78
End: 2023-03-03
Attending: INTERNAL MEDICINE
Payer: MEDICARE

## 2023-03-03 LAB
ANION GAP SERPL CALC-SCNC: 7 MMOL/L (ref 0–18)
BASOPHILS # BLD AUTO: 0.04 X10(3) UL (ref 0–0.2)
BASOPHILS NFR BLD AUTO: 0.5 %
BUN BLD-MCNC: 20 MG/DL (ref 7–18)
CALCIUM BLD-MCNC: 9.3 MG/DL (ref 8.5–10.1)
CHLORIDE SERPL-SCNC: 106 MMOL/L (ref 98–112)
CO2 SERPL-SCNC: 28 MMOL/L (ref 21–32)
CREAT BLD-MCNC: 1.19 MG/DL
EOSINOPHIL # BLD AUTO: 0.27 X10(3) UL (ref 0–0.7)
EOSINOPHIL NFR BLD AUTO: 3.7 %
ERYTHROCYTE [DISTWIDTH] IN BLOOD BY AUTOMATED COUNT: 13.3 %
GFR SERPLBLD BASED ON 1.73 SQ M-ARVRAT: 63 ML/MIN/1.73M2 (ref 60–?)
GLUCOSE BLD-MCNC: 107 MG/DL (ref 70–99)
GLUCOSE BLD-MCNC: 115 MG/DL (ref 70–99)
GLUCOSE BLD-MCNC: 117 MG/DL (ref 70–99)
GLUCOSE BLD-MCNC: 180 MG/DL (ref 70–99)
GLUCOSE BLD-MCNC: 99 MG/DL (ref 70–99)
HCT VFR BLD AUTO: 40.7 %
HGB BLD-MCNC: 14.2 G/DL
IMM GRANULOCYTES # BLD AUTO: 0.01 X10(3) UL (ref 0–1)
IMM GRANULOCYTES NFR BLD: 0.1 %
LYMPHOCYTES # BLD AUTO: 1.98 X10(3) UL (ref 1–4)
LYMPHOCYTES NFR BLD AUTO: 27 %
MAGNESIUM SERPL-MCNC: 1.3 MG/DL (ref 1.6–2.6)
MCH RBC QN AUTO: 34.9 PG (ref 26–34)
MCHC RBC AUTO-ENTMCNC: 34.9 G/DL (ref 31–37)
MCV RBC AUTO: 100 FL
MONOCYTES # BLD AUTO: 0.86 X10(3) UL (ref 0.1–1)
MONOCYTES NFR BLD AUTO: 11.7 %
NEUTROPHILS # BLD AUTO: 4.17 X10 (3) UL (ref 1.5–7.7)
NEUTROPHILS # BLD AUTO: 4.17 X10(3) UL (ref 1.5–7.7)
NEUTROPHILS NFR BLD AUTO: 57 %
OSMOLALITY SERPL CALC.SUM OF ELEC: 296 MOSM/KG (ref 275–295)
PLATELET # BLD AUTO: 162 10(3)UL (ref 150–450)
POTASSIUM SERPL-SCNC: 3.4 MMOL/L (ref 3.5–5.1)
POTASSIUM SERPL-SCNC: 3.4 MMOL/L (ref 3.5–5.1)
POTASSIUM SERPL-SCNC: 4.2 MMOL/L (ref 3.5–5.1)
RBC # BLD AUTO: 4.07 X10(6)UL
SODIUM SERPL-SCNC: 141 MMOL/L (ref 136–145)
WBC # BLD AUTO: 7.3 X10(3) UL (ref 4–11)

## 2023-03-03 PROCEDURE — 93306 TTE W/DOPPLER COMPLETE: CPT | Performed by: INTERNAL MEDICINE

## 2023-03-03 PROCEDURE — 85025 COMPLETE CBC W/AUTO DIFF WBC: CPT | Performed by: HOSPITALIST

## 2023-03-03 PROCEDURE — 80048 BASIC METABOLIC PNL TOTAL CA: CPT | Performed by: HOSPITALIST

## 2023-03-03 PROCEDURE — 84132 ASSAY OF SERUM POTASSIUM: CPT | Performed by: HOSPITALIST

## 2023-03-03 PROCEDURE — 82962 GLUCOSE BLOOD TEST: CPT

## 2023-03-03 PROCEDURE — 83735 ASSAY OF MAGNESIUM: CPT | Performed by: HOSPITALIST

## 2023-03-03 RX ORDER — POTASSIUM CHLORIDE 20 MEQ/1
40 TABLET, EXTENDED RELEASE ORAL EVERY 4 HOURS
Status: COMPLETED | OUTPATIENT
Start: 2023-03-03 | End: 2023-03-03

## 2023-03-03 RX ORDER — MAGNESIUM OXIDE 400 MG/1
800 TABLET ORAL ONCE
Status: COMPLETED | OUTPATIENT
Start: 2023-03-03 | End: 2023-03-03

## 2023-03-03 NOTE — PLAN OF CARE
A&OX4. Denies pain. Room air. R groin soft no sign of hematoma. CV consulted for surgery. ECHO planned for this morning. Up at lisa. Lasix TID. QID. Problem: Patient/Family Goals  Goal: Patient/Family Long Term Goal  Description: Patient's Long Term Goal: Free from edema, chest pain and shortness of breath    Interventions:  - Diurese, cardiology to discuss plan with pt   - See additional Care Plan goals for specific interventions  Outcome: Progressing  Goal: Patient/Family Short Term Goal  Description: Patient's Short Term Goal: Go home free of chest pain and shortness of breath    Interventions:   - Cardiology to see for plan 3/2  - See additional Care Plan goals for specific interventions  Outcome: Progressing     Problem: SAFETY ADULT - FALL  Goal: Free from fall injury  Description: INTERVENTIONS:  - Assess pt frequently for physical needs  - Identify cognitive and physical deficits and behaviors that affect risk of falls.   - [place] fall precautions as indicated by assessment.  - Educate pt/family on patient safety including physical limitations  - Instruct pt to call for assistance with activity based on assessment  - Modify environment to reduce risk of injury  - Provide assistive devices as appropriate  - Consider OT/PT consult to assist with strengthening/mobility  - Encourage toileting schedule  Outcome: Progressing

## 2023-03-03 NOTE — PLAN OF CARE
Patient alert and oriented x 4. On RA. Up ad lisa. NSR on tele. Continent of bowel/bladder. No complaints of pain, shortness of breath, chest pain/discomfort. R groin clean, dry, intact; soft, no hematoma. POC: CV surg to see, IV lasix TID. Call light within reach. Fall precautions in place. Problem: Patient/Family Goals  Goal: Patient/Family Long Term Goal  Description: Patient's Long Term Goal: Free from edema, chest pain and shortness of breath    Interventions:  - Rolf, cardiology to discuss plan with pt   - See additional Care Plan goals for specific interventions  Outcome: Progressing  Goal: Patient/Family Short Term Goal  Description: Patient's Short Term Goal: Go home free of chest pain and shortness of breath    Interventions:   - Cardiology to see for plan 3/2  - See additional Care Plan goals for specific interventions  Outcome: Progressing     Problem: SAFETY ADULT - FALL  Goal: Free from fall injury  Description: INTERVENTIONS:  - Assess pt frequently for physical needs  - Identify cognitive and physical deficits and behaviors that affect risk of falls.   - [place] fall precautions as indicated by assessment.  - Educate pt/family on patient safety including physical limitations  - Instruct pt to call for assistance with activity based on assessment  - Modify environment to reduce risk of injury  - Provide assistive devices as appropriate  - Consider OT/PT consult to assist with strengthening/mobility  - Encourage toileting schedule  Outcome: Progressing

## 2023-03-03 NOTE — PROGRESS NOTES
Complicated case. Discussed with Dr Marisela De La Vega. Probably severe Aortic stenosis. Velocity 3.5 m/sec in face of reduced EF. I guess 30-35%  Severe pulmonary  hypertension  Severe multivessel CAD. We need to consider all options. Certainly surgery is an option, but I am not sure it is best/safest option. Pt interviewed but unable to examine as echo was being performed.

## 2023-03-04 LAB
ANION GAP SERPL CALC-SCNC: 8 MMOL/L (ref 0–18)
BASOPHILS # BLD AUTO: 0.06 X10(3) UL (ref 0–0.2)
BASOPHILS NFR BLD AUTO: 0.8 %
BUN BLD-MCNC: 18 MG/DL (ref 7–18)
CALCIUM BLD-MCNC: 9.6 MG/DL (ref 8.5–10.1)
CHLORIDE SERPL-SCNC: 101 MMOL/L (ref 98–112)
CO2 SERPL-SCNC: 28 MMOL/L (ref 21–32)
CREAT BLD-MCNC: 1.17 MG/DL
EOSINOPHIL # BLD AUTO: 0.35 X10(3) UL (ref 0–0.7)
EOSINOPHIL NFR BLD AUTO: 4.4 %
ERYTHROCYTE [DISTWIDTH] IN BLOOD BY AUTOMATED COUNT: 13 %
GFR SERPLBLD BASED ON 1.73 SQ M-ARVRAT: 64 ML/MIN/1.73M2 (ref 60–?)
GLUCOSE BLD-MCNC: 111 MG/DL (ref 70–99)
GLUCOSE BLD-MCNC: 111 MG/DL (ref 70–99)
GLUCOSE BLD-MCNC: 123 MG/DL (ref 70–99)
GLUCOSE BLD-MCNC: 142 MG/DL (ref 70–99)
GLUCOSE BLD-MCNC: 99 MG/DL (ref 70–99)
HCT VFR BLD AUTO: 44 %
HGB BLD-MCNC: 15 G/DL
IMM GRANULOCYTES # BLD AUTO: 0.01 X10(3) UL (ref 0–1)
IMM GRANULOCYTES NFR BLD: 0.1 %
LYMPHOCYTES # BLD AUTO: 1.5 X10(3) UL (ref 1–4)
LYMPHOCYTES NFR BLD AUTO: 18.8 %
MAGNESIUM SERPL-MCNC: 2 MG/DL (ref 1.6–2.6)
MCH RBC QN AUTO: 34.4 PG (ref 26–34)
MCHC RBC AUTO-ENTMCNC: 34.1 G/DL (ref 31–37)
MCV RBC AUTO: 100.9 FL
MONOCYTES # BLD AUTO: 1.19 X10(3) UL (ref 0.1–1)
MONOCYTES NFR BLD AUTO: 14.9 %
NEUTROPHILS # BLD AUTO: 4.88 X10 (3) UL (ref 1.5–7.7)
NEUTROPHILS # BLD AUTO: 4.88 X10(3) UL (ref 1.5–7.7)
NEUTROPHILS NFR BLD AUTO: 61 %
OSMOLALITY SERPL CALC.SUM OF ELEC: 287 MOSM/KG (ref 275–295)
PLATELET # BLD AUTO: 172 10(3)UL (ref 150–450)
POTASSIUM SERPL-SCNC: 3.6 MMOL/L (ref 3.5–5.1)
POTASSIUM SERPL-SCNC: 4.5 MMOL/L (ref 3.5–5.1)
RBC # BLD AUTO: 4.36 X10(6)UL
SODIUM SERPL-SCNC: 137 MMOL/L (ref 136–145)
WBC # BLD AUTO: 8 X10(3) UL (ref 4–11)

## 2023-03-04 PROCEDURE — 82962 GLUCOSE BLOOD TEST: CPT

## 2023-03-04 PROCEDURE — 83735 ASSAY OF MAGNESIUM: CPT | Performed by: HOSPITALIST

## 2023-03-04 PROCEDURE — 80048 BASIC METABOLIC PNL TOTAL CA: CPT | Performed by: HOSPITALIST

## 2023-03-04 PROCEDURE — 85025 COMPLETE CBC W/AUTO DIFF WBC: CPT | Performed by: HOSPITALIST

## 2023-03-04 PROCEDURE — 84132 ASSAY OF SERUM POTASSIUM: CPT | Performed by: INTERNAL MEDICINE

## 2023-03-04 RX ORDER — POTASSIUM CHLORIDE 20 MEQ/1
40 TABLET, EXTENDED RELEASE ORAL EVERY 4 HOURS
Status: COMPLETED | OUTPATIENT
Start: 2023-03-04 | End: 2023-03-04

## 2023-03-04 RX ORDER — ISOSORBIDE DINITRATE 5 MG/1
5 TABLET ORAL
Status: DISCONTINUED | OUTPATIENT
Start: 2023-03-05 | End: 2023-03-09

## 2023-03-04 RX ORDER — FUROSEMIDE 10 MG/ML
40 INJECTION INTRAMUSCULAR; INTRAVENOUS DAILY
Status: DISCONTINUED | OUTPATIENT
Start: 2023-03-05 | End: 2023-03-07

## 2023-03-04 NOTE — PLAN OF CARE
Assumed care around 1930. A/Ox4. On RA w/ sats >90. Monitor shows NSR. Voids in urinal or BR, last BM 3/1. No reports of pain. Up ad lisa in the room, steady gait noted. Plan to cont IV lasix, MD to see. Safety precautions in place, call light within reach. Problem: Patient/Family Goals  Goal: Patient/Family Long Term Goal  Description: Patient's Long Term Goal: Free from edema, chest pain and shortness of breath    Interventions:  - Rolf, cardiology to discuss plan with pt   - See additional Care Plan goals for specific interventions  Outcome: Progressing  Goal: Patient/Family Short Term Goal  Description: Patient's Short Term Goal: Go home free of chest pain and shortness of breath    Interventions:   - Cardiology to see for plan 3/2  - See additional Care Plan goals for specific interventions  Outcome: Progressing     Problem: SAFETY ADULT - FALL  Goal: Free from fall injury  Description: INTERVENTIONS:  - Assess pt frequently for physical needs  - Identify cognitive and physical deficits and behaviors that affect risk of falls.   - [place] fall precautions as indicated by assessment.  - Educate pt/family on patient safety including physical limitations  - Instruct pt to call for assistance with activity based on assessment  - Modify environment to reduce risk of injury  - Provide assistive devices as appropriate  - Consider OT/PT consult to assist with strengthening/mobility  - Encourage toileting schedule  Outcome: Progressing

## 2023-03-04 NOTE — PLAN OF CARE
Pt declines complaints of pain, malaise, or cardiac symptoms. A&Ox4, lungs clear with equal expansion, on rm air. Pt in NSR, on monitor. Abdomen soft and non-tender with active bowel sounds in all 4 quadrants, continent of B&B. Patient updated with plan of care. Problem: Patient/Family Goals  Goal: Patient/Family Long Term Goal  Description: Patient's Long Term Goal: \"find out plan for surgery\"    Interventions:  -Robbi Edwards to see  -Labs  - See additional Care Plan goals for specific interventions  Outcome: Progressing  Goal: Patient/Family Short Term Goal  Description: Patient's Short Term Goal: \"stay out of hospital\"    Interventions:   -Follow up appts  -Labs  -Med compliance  - See additional Care Plan goals for specific interventions  Outcome: Progressing     Problem: SAFETY ADULT - FALL  Goal: Free from fall injury  Description: INTERVENTIONS:  - Assess pt frequently for physical needs  - Identify cognitive and physical deficits and behaviors that affect risk of falls.   - [place] fall precautions as indicated by assessment.  - Educate pt/family on patient safety including physical limitations  - Instruct pt to call for assistance with activity based on assessment  - Modify environment to reduce risk of injury  - Provide assistive devices as appropriate  - Consider OT/PT consult to assist with strengthening/mobility  - Encourage toileting schedule  Outcome: Progressing

## 2023-03-05 LAB
ANION GAP SERPL CALC-SCNC: 6 MMOL/L (ref 0–18)
BUN BLD-MCNC: 22 MG/DL (ref 7–18)
CALCIUM BLD-MCNC: 9.4 MG/DL (ref 8.5–10.1)
CHLORIDE SERPL-SCNC: 102 MMOL/L (ref 98–112)
CO2 SERPL-SCNC: 28 MMOL/L (ref 21–32)
CREAT BLD-MCNC: 1.24 MG/DL
GFR SERPLBLD BASED ON 1.73 SQ M-ARVRAT: 60 ML/MIN/1.73M2 (ref 60–?)
GLUCOSE BLD-MCNC: 108 MG/DL (ref 70–99)
GLUCOSE BLD-MCNC: 118 MG/DL (ref 70–99)
GLUCOSE BLD-MCNC: 119 MG/DL (ref 70–99)
OSMOLALITY SERPL CALC.SUM OF ELEC: 286 MOSM/KG (ref 275–295)
POTASSIUM SERPL-SCNC: 3.7 MMOL/L (ref 3.5–5.1)
SODIUM SERPL-SCNC: 136 MMOL/L (ref 136–145)
TSI SER-ACNC: 2.38 MIU/ML (ref 0.36–3.74)

## 2023-03-05 PROCEDURE — 84443 ASSAY THYROID STIM HORMONE: CPT | Performed by: INTERNAL MEDICINE

## 2023-03-05 PROCEDURE — 82962 GLUCOSE BLOOD TEST: CPT

## 2023-03-05 PROCEDURE — 80048 BASIC METABOLIC PNL TOTAL CA: CPT | Performed by: INTERNAL MEDICINE

## 2023-03-05 RX ORDER — POTASSIUM CHLORIDE 20 MEQ/1
40 TABLET, EXTENDED RELEASE ORAL ONCE
Status: COMPLETED | OUTPATIENT
Start: 2023-03-05 | End: 2023-03-05

## 2023-03-05 NOTE — PLAN OF CARE
Pt is A&Ox4. Pt is up ad-lisa in room. Pt is on room air. Normal sinus rhythm, +murmur. Lung sounds clear. No complaints of pain. Continent of bladder and bowel. Last BM 3/4/23. Plan for CV surgery to see tomorrow. Call light in reach. Will continue to monitor. Problem: Patient/Family Goals  Goal: Patient/Family Long Term Goal  Description: Patient's Long Term Goal: \"find out plan for surgery\"    Interventions:  -Good [place] to see  -Labs  - See additional Care Plan goals for specific interventions  Outcome: Progressing  Goal: Patient/Family Short Term Goal  Description: Patient's Short Term Goal: \"stay out of hospital\"    Interventions:   -Follow up appts  -Labs  -Med compliance  - See additional Care Plan goals for specific interventions  Outcome: Progressing     Problem: SAFETY ADULT - FALL  Goal: Free from fall injury  Description: INTERVENTIONS:  - Assess pt frequently for physical needs  - Identify cognitive and physical deficits and behaviors that affect risk of falls.   - [place] fall precautions as indicated by assessment.  - Educate pt/family on patient safety including physical limitations  - Instruct pt to call for assistance with activity based on assessment  - Modify environment to reduce risk of injury  - Provide assistive devices as appropriate  - Consider OT/PT consult to assist with strengthening/mobility  - Encourage toileting schedule  Outcome: Progressing

## 2023-03-05 NOTE — PLAN OF CARE
A&Ox4  O2 sat WNL on RA  SR on tele. Denies chest pain   Continent of bladder and bowel   Iv lasix scheduled daily   Refusing sub q heparin, however pt ambulating around room and halls without difficulty  Up with SBA     Plan: Dr Tu Preciado to discuss options on Monday. IV lasix daily. Daily weight     Problem: Patient/Family Goals  Goal: Patient/Family Long Term Goal  Description: Patient's Long Term Goal: \"find out plan for surgery\"    Interventions:  -Tu Preciado to see  -Labs  - See additional Care Plan goals for specific interventions  Outcome: Progressing  Goal: Patient/Family Short Term Goal  Description: Patient's Short Term Goal: \"stay out of hospital\"    Interventions:   -Follow up appts  -Labs  -Med compliance  - See additional Care Plan goals for specific interventions  Outcome: Progressing     Problem: SAFETY ADULT - FALL  Goal: Free from fall injury  Description: INTERVENTIONS:  - Assess pt frequently for physical needs  - Identify cognitive and physical deficits and behaviors that affect risk of falls.   - [place] fall precautions as indicated by assessment.  - Educate pt/family on patient safety including physical limitations  - Instruct pt to call for assistance with activity based on assessment  - Modify environment to reduce risk of injury  - Provide assistive devices as appropriate  - Consider OT/PT consult to assist with strengthening/mobility  - Encourage toileting schedule  Outcome: Progressing

## 2023-03-06 ENCOUNTER — APPOINTMENT (OUTPATIENT)
Dept: CT IMAGING | Facility: HOSPITAL | Age: 78
End: 2023-03-06
Attending: THORACIC SURGERY (CARDIOTHORACIC VASCULAR SURGERY)
Payer: MEDICARE

## 2023-03-06 ENCOUNTER — APPOINTMENT (OUTPATIENT)
Dept: CV DIAGNOSTICS | Facility: HOSPITAL | Age: 78
End: 2023-03-06
Attending: INTERNAL MEDICINE
Payer: MEDICARE

## 2023-03-06 LAB
ANION GAP SERPL CALC-SCNC: 7 MMOL/L (ref 0–18)
BASOPHILS # BLD AUTO: 0.06 X10(3) UL (ref 0–0.2)
BASOPHILS NFR BLD AUTO: 0.8 %
BUN BLD-MCNC: 25 MG/DL (ref 7–18)
CALCIUM BLD-MCNC: 9.5 MG/DL (ref 8.5–10.1)
CHLORIDE SERPL-SCNC: 100 MMOL/L (ref 98–112)
CO2 SERPL-SCNC: 28 MMOL/L (ref 21–32)
CREAT BLD-MCNC: 1.33 MG/DL
EOSINOPHIL # BLD AUTO: 0.5 X10(3) UL (ref 0–0.7)
EOSINOPHIL NFR BLD AUTO: 6.8 %
ERYTHROCYTE [DISTWIDTH] IN BLOOD BY AUTOMATED COUNT: 13.1 %
GFR SERPLBLD BASED ON 1.73 SQ M-ARVRAT: 55 ML/MIN/1.73M2 (ref 60–?)
GLUCOSE BLD-MCNC: 107 MG/DL (ref 70–99)
GLUCOSE BLD-MCNC: 127 MG/DL (ref 70–99)
HCT VFR BLD AUTO: 43.7 %
HGB BLD-MCNC: 15.3 G/DL
IMM GRANULOCYTES # BLD AUTO: 0.02 X10(3) UL (ref 0–1)
IMM GRANULOCYTES NFR BLD: 0.3 %
LYMPHOCYTES # BLD AUTO: 1.81 X10(3) UL (ref 1–4)
LYMPHOCYTES NFR BLD AUTO: 24.7 %
MCH RBC QN AUTO: 35.5 PG (ref 26–34)
MCHC RBC AUTO-ENTMCNC: 35 G/DL (ref 31–37)
MCV RBC AUTO: 101.4 FL
MONOCYTES # BLD AUTO: 1.05 X10(3) UL (ref 0.1–1)
MONOCYTES NFR BLD AUTO: 14.3 %
NEUTROPHILS # BLD AUTO: 3.9 X10 (3) UL (ref 1.5–7.7)
NEUTROPHILS # BLD AUTO: 3.9 X10(3) UL (ref 1.5–7.7)
NEUTROPHILS NFR BLD AUTO: 53.1 %
OSMOLALITY SERPL CALC.SUM OF ELEC: 285 MOSM/KG (ref 275–295)
PLATELET # BLD AUTO: 188 10(3)UL (ref 150–450)
POTASSIUM SERPL-SCNC: 3.7 MMOL/L (ref 3.5–5.1)
POTASSIUM SERPL-SCNC: 3.7 MMOL/L (ref 3.5–5.1)
RBC # BLD AUTO: 4.31 X10(6)UL
SODIUM SERPL-SCNC: 135 MMOL/L (ref 136–145)
WBC # BLD AUTO: 7.3 X10(3) UL (ref 4–11)

## 2023-03-06 PROCEDURE — 80048 BASIC METABOLIC PNL TOTAL CA: CPT | Performed by: INTERNAL MEDICINE

## 2023-03-06 PROCEDURE — 93306 TTE W/DOPPLER COMPLETE: CPT | Performed by: INTERNAL MEDICINE

## 2023-03-06 PROCEDURE — 82962 GLUCOSE BLOOD TEST: CPT

## 2023-03-06 PROCEDURE — 71250 CT THORAX DX C-: CPT | Performed by: THORACIC SURGERY (CARDIOTHORACIC VASCULAR SURGERY)

## 2023-03-06 PROCEDURE — 84132 ASSAY OF SERUM POTASSIUM: CPT | Performed by: INTERNAL MEDICINE

## 2023-03-06 PROCEDURE — 85025 COMPLETE CBC W/AUTO DIFF WBC: CPT | Performed by: INTERNAL MEDICINE

## 2023-03-06 RX ORDER — ATORVASTATIN CALCIUM 40 MG/1
40 TABLET, FILM COATED ORAL NIGHTLY
Status: DISCONTINUED | OUTPATIENT
Start: 2023-03-06 | End: 2023-03-09

## 2023-03-06 RX ORDER — POTASSIUM CHLORIDE 20 MEQ/1
40 TABLET, EXTENDED RELEASE ORAL ONCE
Status: COMPLETED | OUTPATIENT
Start: 2023-03-06 | End: 2023-03-06

## 2023-03-06 RX ORDER — CLOPIDOGREL BISULFATE 75 MG/1
75 TABLET ORAL DAILY
Status: DISCONTINUED | OUTPATIENT
Start: 2023-03-06 | End: 2023-03-08

## 2023-03-06 RX ORDER — ASPIRIN 81 MG/1
81 TABLET, CHEWABLE ORAL DAILY
Status: DISCONTINUED | OUTPATIENT
Start: 2023-03-06 | End: 2023-03-08

## 2023-03-06 NOTE — PLAN OF CARE
A&Ox4  O2 sat WNL on RA  SR with 1st degree AV block on tele. Denies chest pain. Continent of bladder and bowel. On IV lasix daily  Daily weight  Refusing sub q heparin and accuchecks   Up ad lisa    Plan: Decision on PCI vs CABG w AVR. Repeat echo       Problem: Patient/Family Goals  Goal: Patient/Family Long Term Goal  Description: Patient's Long Term Goal: \"find out plan for surgery\"    Interventions:  -Alvaro to see  -Labs  - See additional Care Plan goals for specific interventions  Outcome: Progressing  Goal: Patient/Family Short Term Goal  Description: Patient's Short Term Goal: \"stay out of hospital\"    Interventions:   -Follow up appts  -Labs  -Med compliance  - See additional Care Plan goals for specific interventions  Outcome: Progressing     Problem: SAFETY ADULT - FALL  Goal: Free from fall injury  Description: INTERVENTIONS:  - Assess pt frequently for physical needs  - Identify cognitive and physical deficits and behaviors that affect risk of falls.   - [place] fall precautions as indicated by assessment.  - Educate pt/family on patient safety including physical limitations  - Instruct pt to call for assistance with activity based on assessment  - Modify environment to reduce risk of injury  - Provide assistive devices as appropriate  - Consider OT/PT consult to assist with strengthening/mobility  - Encourage toileting schedule  Outcome: Progressing

## 2023-03-06 NOTE — PLAN OF CARE
Assumed care of pt at 0730   Pt a&o x4, up adlib w/ steady gait. Pt on room air, normal sinus rhythm with a 1st degree AV block, +murmur. No complaints of chest pain. Skin is dry, warm and intact. Continent of bladder and bowel. Wife present at bedside. Pt and wife updated on plan of care. Call light in reach. Problem: Patient/Family Goals  Goal: Patient/Family Long Term Goal  Description: Patient's Long Term Goal: \"find out plan for surgery\"    Interventions:  -Joce Deep to see  -Labs  - See additional Care Plan goals for specific interventions  Outcome: Progressing  Goal: Patient/Family Short Term Goal  Description: Patient's Short Term Goal: \"stay out of hospital\"    Interventions:   -Follow up appts  -Labs  -Med compliance  - See additional Care Plan goals for specific interventions  Outcome: Progressing     Problem: SAFETY ADULT - FALL  Goal: Free from fall injury  Description: INTERVENTIONS:  - Assess pt frequently for physical needs  - Identify cognitive and physical deficits and behaviors that affect risk of falls.   - [place] fall precautions as indicated by assessment.  - Educate pt/family on patient safety including physical limitations  - Instruct pt to call for assistance with activity based on assessment  - Modify environment to reduce risk of injury  - Provide assistive devices as appropriate  - Consider OT/PT consult to assist with strengthening/mobility  - Encourage toileting schedule  Outcome: Progressing

## 2023-03-07 LAB
ANION GAP SERPL CALC-SCNC: 6 MMOL/L (ref 0–18)
BASOPHILS # BLD AUTO: 0.07 X10(3) UL (ref 0–0.2)
BASOPHILS NFR BLD AUTO: 1 %
BUN BLD-MCNC: 28 MG/DL (ref 7–18)
CALCIUM BLD-MCNC: 9.2 MG/DL (ref 8.5–10.1)
CHLORIDE SERPL-SCNC: 104 MMOL/L (ref 98–112)
CO2 SERPL-SCNC: 27 MMOL/L (ref 21–32)
CREAT BLD-MCNC: 1.27 MG/DL
EOSINOPHIL # BLD AUTO: 0.51 X10(3) UL (ref 0–0.7)
EOSINOPHIL NFR BLD AUTO: 7.1 %
ERYTHROCYTE [DISTWIDTH] IN BLOOD BY AUTOMATED COUNT: 13 %
GFR SERPLBLD BASED ON 1.73 SQ M-ARVRAT: 58 ML/MIN/1.73M2 (ref 60–?)
GLUCOSE BLD-MCNC: 105 MG/DL (ref 70–99)
GLUCOSE BLD-MCNC: 114 MG/DL (ref 70–99)
GLUCOSE BLD-MCNC: 125 MG/DL (ref 70–99)
GLUCOSE BLD-MCNC: 141 MG/DL (ref 70–99)
HCT VFR BLD AUTO: 42.8 %
HGB BLD-MCNC: 14.9 G/DL
IMM GRANULOCYTES # BLD AUTO: 0.02 X10(3) UL (ref 0–1)
IMM GRANULOCYTES NFR BLD: 0.3 %
LYMPHOCYTES # BLD AUTO: 1.54 X10(3) UL (ref 1–4)
LYMPHOCYTES NFR BLD AUTO: 21.5 %
MCH RBC QN AUTO: 34.9 PG (ref 26–34)
MCHC RBC AUTO-ENTMCNC: 34.8 G/DL (ref 31–37)
MCV RBC AUTO: 100.2 FL
MONOCYTES # BLD AUTO: 1.09 X10(3) UL (ref 0.1–1)
MONOCYTES NFR BLD AUTO: 15.2 %
NEUTROPHILS # BLD AUTO: 3.93 X10 (3) UL (ref 1.5–7.7)
NEUTROPHILS # BLD AUTO: 3.93 X10(3) UL (ref 1.5–7.7)
NEUTROPHILS NFR BLD AUTO: 54.9 %
NT-PROBNP SERPL-MCNC: ABNORMAL PG/ML (ref ?–450)
OSMOLALITY SERPL CALC.SUM OF ELEC: 291 MOSM/KG (ref 275–295)
PLATELET # BLD AUTO: 199 10(3)UL (ref 150–450)
POTASSIUM SERPL-SCNC: 3.7 MMOL/L (ref 3.5–5.1)
POTASSIUM SERPL-SCNC: 3.7 MMOL/L (ref 3.5–5.1)
RBC # BLD AUTO: 4.27 X10(6)UL
SARS-COV-2 RNA RESP QL NAA+PROBE: NOT DETECTED
SODIUM SERPL-SCNC: 137 MMOL/L (ref 136–145)
WBC # BLD AUTO: 7.2 X10(3) UL (ref 4–11)

## 2023-03-07 PROCEDURE — 83880 ASSAY OF NATRIURETIC PEPTIDE: CPT | Performed by: NURSE PRACTITIONER

## 2023-03-07 PROCEDURE — 84132 ASSAY OF SERUM POTASSIUM: CPT | Performed by: INTERNAL MEDICINE

## 2023-03-07 PROCEDURE — 80048 BASIC METABOLIC PNL TOTAL CA: CPT | Performed by: HOSPITALIST

## 2023-03-07 PROCEDURE — 85025 COMPLETE CBC W/AUTO DIFF WBC: CPT | Performed by: HOSPITALIST

## 2023-03-07 PROCEDURE — 82962 GLUCOSE BLOOD TEST: CPT

## 2023-03-07 RX ORDER — SODIUM CHLORIDE 9 MG/ML
INJECTION, SOLUTION INTRAVENOUS
Status: ACTIVE | OUTPATIENT
Start: 2023-03-08 | End: 2023-03-08

## 2023-03-07 RX ORDER — POTASSIUM CHLORIDE 20 MEQ/1
40 TABLET, EXTENDED RELEASE ORAL ONCE
Status: COMPLETED | OUTPATIENT
Start: 2023-03-07 | End: 2023-03-07

## 2023-03-07 RX ORDER — CLOPIDOGREL BISULFATE 75 MG/1
450 TABLET ORAL ONCE
Status: COMPLETED | OUTPATIENT
Start: 2023-03-07 | End: 2023-03-07

## 2023-03-07 NOTE — PLAN OF CARE
A&Ox4  O2 sat WNL on RA  SR on tele. Denies chest pain   Continent of bladder and bowel   Tylenol given for headache   Refusing subcutaneous heparin and accuchecks   Up ad lisa    plan: complex PCI Wednesday     Problem: Patient/Family Goals  Goal: Patient/Family Long Term Goal  Description: Patient's Long Term Goal: \"find out plan for surgery\"    Interventions:  -Alvaro to see  -Labs  - See additional Care Plan goals for specific interventions  Outcome: Progressing  Goal: Patient/Family Short Term Goal  Description: Patient's Short Term Goal: \"stay out of hospital\"    Interventions:   -Follow up appts  -Labs  -Med compliance  - See additional Care Plan goals for specific interventions  Outcome: Progressing     Problem: SAFETY ADULT - FALL  Goal: Free from fall injury  Description: INTERVENTIONS:  - Assess pt frequently for physical needs  - Identify cognitive and physical deficits and behaviors that affect risk of falls.   - [place] fall precautions as indicated by assessment.  - Educate pt/family on patient safety including physical limitations  - Instruct pt to call for assistance with activity based on assessment  - Modify environment to reduce risk of injury  - Provide assistive devices as appropriate  - Consider OT/PT consult to assist with strengthening/mobility  - Encourage toileting schedule  Outcome: Progressing

## 2023-03-07 NOTE — PLAN OF CARE
Patient is alert and orientated x4. Patient's O2 sat is within normal limits on room air. Patient is normal sinus rhythm with first degree heart block. Patient denies any chest pain. Lasix was discontinued. K replaced per electrolyte protocol. POC: Receiving 450 mg of Plavix per MD and will undergo PCI tomorrow.

## 2023-03-08 ENCOUNTER — APPOINTMENT (OUTPATIENT)
Dept: INTERVENTIONAL RADIOLOGY/VASCULAR | Facility: HOSPITAL | Age: 78
End: 2023-03-08
Attending: NURSE PRACTITIONER
Payer: MEDICARE

## 2023-03-08 LAB
ANION GAP SERPL CALC-SCNC: 7 MMOL/L (ref 0–18)
BASOPHILS # BLD AUTO: 0.07 X10(3) UL (ref 0–0.2)
BASOPHILS NFR BLD AUTO: 0.9 %
BUN BLD-MCNC: 27 MG/DL (ref 7–18)
CALCIUM BLD-MCNC: 9.4 MG/DL (ref 8.5–10.1)
CHLORIDE SERPL-SCNC: 103 MMOL/L (ref 98–112)
CO2 SERPL-SCNC: 27 MMOL/L (ref 21–32)
CREAT BLD-MCNC: 1.14 MG/DL
EOSINOPHIL # BLD AUTO: 0.51 X10(3) UL (ref 0–0.7)
EOSINOPHIL NFR BLD AUTO: 6.3 %
ERYTHROCYTE [DISTWIDTH] IN BLOOD BY AUTOMATED COUNT: 12.9 %
GFR SERPLBLD BASED ON 1.73 SQ M-ARVRAT: 66 ML/MIN/1.73M2 (ref 60–?)
GLUCOSE BLD-MCNC: 105 MG/DL (ref 70–99)
GLUCOSE BLD-MCNC: 108 MG/DL (ref 70–99)
GLUCOSE BLD-MCNC: 110 MG/DL (ref 70–99)
HCT VFR BLD AUTO: 45.5 %
HGB BLD-MCNC: 15.2 G/DL
IMM GRANULOCYTES # BLD AUTO: 0.02 X10(3) UL (ref 0–1)
IMM GRANULOCYTES NFR BLD: 0.2 %
ISTAT ACTIVATED CLOTTING TIME: 257 SECONDS (ref 74–137)
LYMPHOCYTES # BLD AUTO: 1.2 X10(3) UL (ref 1–4)
LYMPHOCYTES NFR BLD AUTO: 14.8 %
MCH RBC QN AUTO: 34.5 PG (ref 26–34)
MCHC RBC AUTO-ENTMCNC: 33.4 G/DL (ref 31–37)
MCV RBC AUTO: 103.2 FL
MONOCYTES # BLD AUTO: 1.14 X10(3) UL (ref 0.1–1)
MONOCYTES NFR BLD AUTO: 14 %
NEUTROPHILS # BLD AUTO: 5.18 X10 (3) UL (ref 1.5–7.7)
NEUTROPHILS # BLD AUTO: 5.18 X10(3) UL (ref 1.5–7.7)
NEUTROPHILS NFR BLD AUTO: 63.8 %
OSMOLALITY SERPL CALC.SUM OF ELEC: 290 MOSM/KG (ref 275–295)
PLATELET # BLD AUTO: 203 10(3)UL (ref 150–450)
POTASSIUM SERPL-SCNC: 3.7 MMOL/L (ref 3.5–5.1)
POTASSIUM SERPL-SCNC: 3.7 MMOL/L (ref 3.5–5.1)
RBC # BLD AUTO: 4.41 X10(6)UL
SODIUM SERPL-SCNC: 137 MMOL/L (ref 136–145)
WBC # BLD AUTO: 8.1 X10(3) UL (ref 4–11)

## 2023-03-08 PROCEDURE — 85347 COAGULATION TIME ACTIVATED: CPT

## 2023-03-08 PROCEDURE — 027137Z DILATION OF CORONARY ARTERY, TWO ARTERIES WITH FOUR OR MORE DRUG-ELUTING INTRALUMINAL DEVICES, PERCUTANEOUS APPROACH: ICD-10-PCS | Performed by: INTERNAL MEDICINE

## 2023-03-08 PROCEDURE — 82962 GLUCOSE BLOOD TEST: CPT

## 2023-03-08 PROCEDURE — 84132 ASSAY OF SERUM POTASSIUM: CPT | Performed by: NURSE PRACTITIONER

## 2023-03-08 PROCEDURE — B241ZZ3 ULTRASONOGRAPHY OF MULTIPLE CORONARY ARTERIES, INTRAVASCULAR: ICD-10-PCS | Performed by: INTERNAL MEDICINE

## 2023-03-08 PROCEDURE — 92978 ENDOLUMINL IVUS OCT C 1ST: CPT | Performed by: INTERNAL MEDICINE

## 2023-03-08 PROCEDURE — 99152 MOD SED SAME PHYS/QHP 5/>YRS: CPT | Performed by: INTERNAL MEDICINE

## 2023-03-08 PROCEDURE — 99153 MOD SED SAME PHYS/QHP EA: CPT | Performed by: INTERNAL MEDICINE

## 2023-03-08 PROCEDURE — 80048 BASIC METABOLIC PNL TOTAL CA: CPT | Performed by: HOSPITALIST

## 2023-03-08 PROCEDURE — 85025 COMPLETE CBC W/AUTO DIFF WBC: CPT | Performed by: HOSPITALIST

## 2023-03-08 PROCEDURE — 92979 ENDOLUMINL IVUS OCT C EA: CPT | Performed by: INTERNAL MEDICINE

## 2023-03-08 RX ORDER — HEPARIN SODIUM 5000 [USP'U]/ML
INJECTION, SOLUTION INTRAVENOUS; SUBCUTANEOUS
Status: COMPLETED
Start: 2023-03-08 | End: 2023-03-08

## 2023-03-08 RX ORDER — CLOPIDOGREL BISULFATE 75 MG/1
75 TABLET ORAL DAILY
Status: DISCONTINUED | OUTPATIENT
Start: 2023-03-09 | End: 2023-03-09

## 2023-03-08 RX ORDER — ASPIRIN 81 MG/1
81 TABLET ORAL DAILY
Status: DISCONTINUED | OUTPATIENT
Start: 2023-03-09 | End: 2023-03-09

## 2023-03-08 RX ORDER — POTASSIUM CHLORIDE 20 MEQ/1
40 TABLET, EXTENDED RELEASE ORAL ONCE
Status: COMPLETED | OUTPATIENT
Start: 2023-03-08 | End: 2023-03-08

## 2023-03-08 RX ORDER — MIDAZOLAM HYDROCHLORIDE 1 MG/ML
INJECTION INTRAMUSCULAR; INTRAVENOUS
Status: COMPLETED
Start: 2023-03-08 | End: 2023-03-08

## 2023-03-08 RX ORDER — LIDOCAINE HYDROCHLORIDE 10 MG/ML
INJECTION, SOLUTION EPIDURAL; INFILTRATION; INTRACAUDAL; PERINEURAL
Status: COMPLETED
Start: 2023-03-08 | End: 2023-03-08

## 2023-03-08 RX ORDER — SODIUM CHLORIDE 9 MG/ML
INJECTION, SOLUTION INTRAVENOUS CONTINUOUS
Status: ACTIVE | OUTPATIENT
Start: 2023-03-08 | End: 2023-03-08

## 2023-03-08 NOTE — DISCHARGE INSTRUCTIONS
Going Home    In this section you will find the tools which will guide you through the first few days after you leave the hospital. Continued use of these tools will help you develop the skills necessary to keep your heart failure under control. Heart Failure Guidelines - place this worksheet on your refrigerator or somewhere you can refer to it daily to help you decide if your symptoms are under control, and what to do if they are not. Home Care Instructions Following Heart Failure - the most important things to do every day include:     Weigh yourself  Take your medicines as prescribed  Limit your sodium (salt) and fluid intake  Know when to call your cardiologist, primary doctor, or nurse  Know when to seek emergency care    There is also a handy weight chart on which to record your weight every day, information on measuring fluids and limiting fluid intake, and a section for recording your thoughts or questions. Things for You to Remember:   1. An appointment has been made for you to see your doctor or healthcare provider within 7 days of hospital discharge. It is important that you attend this appointment to make sure your symptoms are under control. 2. Your recommended sodium intake is [id number] mg daily    3. Limit your fluid intake to no more than 2 liters or 64 ounces per day    4. Some exercise and activity is important to help keep your heart functioning and strong. Unless instructed not to exercise, you may walk at a slow to moderate pace for 10-15 minutes 2-3 days per week to start. Pace your activity to prevent shortness of breath or fatigue. Stop exercise if you develop chest pain, lightheadedness, or significant shortness of breath.        Call Your Cardiologist If:   You gain 2 pounds overnight or 3-4 pounds in 3-5 days  You have more difficulty breathing  You are getting more tired with normal activity  You are more short of breath lying down, or awaken at night short of breath  You have swelling of your feet or legs  You urinate less often during the day and more often at night  You have cramps in your legs  You have blurred vision or see yellowish-green halos around objects of lights    Go to the Emergency Room If:   You have pain or tightness in your chest  You are extremely short of breath  You are coughing up pink-frothy mucus  You are traveling and develop symptoms of worsening heart failure    Additional Resources:    If you have questions or concerns about heart failure management, call the [facility] Failure Unit at [phone]

## 2023-03-08 NOTE — PLAN OF CARE
Problem: Patient/Family Goals  Goal: Patient/Family Long Term Goal  Description: Patient's Long Term Goal: \"find out plan for surgery\"    Interventions:  -Tu Preciado to see  -Labs  - See additional Care Plan goals for specific interventions  Outcome: Progressing  Goal: Patient/Family Short Term Goal  Description: Patient's Short Term Goal: \"stay out of hospital\"    Interventions:   -Follow up appts  -Labs  -Med compliance  - See additional Care Plan goals for specific interventions  Outcome: Progressing     Problem: SAFETY ADULT - FALL  Goal: Free from fall injury  Description: INTERVENTIONS:  - Assess pt frequently for physical needs  - Identify cognitive and physical deficits and behaviors that affect risk of falls.   - [place] fall precautions as indicated by assessment.  - Educate pt/family on patient safety including physical limitations  - Instruct pt to call for assistance with activity based on assessment  - Modify environment to reduce risk of injury  - Provide assistive devices as appropriate  - Consider OT/PT consult to assist with strengthening/mobility  - Encourage toileting schedule  Outcome: Progressing

## 2023-03-08 NOTE — PLAN OF CARE
Assumed care of pt at 1900. Pt alert and oriented x4, denies any pain or shortness of breath. Lung sounds clear bilaterally, NSR with a 1st degree heart block on tele. Abdomen, soft non tender. Bed locked and in lowest position, call light within reach. POC: complex PCI in the am. Consent signed, skin prepped. Pt updated, all questions answered, verbalized understanding. Problem: Patient/Family Goals  Goal: Patient/Family Long Term Goal  Description: Patient's Long Term Goal: \"find out plan for surgery\"    Interventions:  -Christine Villa to see  -Labs  - See additional Care Plan goals for specific interventions  Outcome: Progressing  Goal: Patient/Family Short Term Goal  Description: Patient's Short Term Goal: \"stay out of hospital\"    Interventions:   -Follow up appts  -Labs  -Med compliance  - See additional Care Plan goals for specific interventions  Outcome: Progressing     Problem: SAFETY ADULT - FALL  Goal: Free from fall injury  Description: INTERVENTIONS:  - Assess pt frequently for physical needs  - Identify cognitive and physical deficits and behaviors that affect risk of falls.   - [place] fall precautions as indicated by assessment.  - Educate pt/family on patient safety including physical limitations  - Instruct pt to call for assistance with activity based on assessment  - Modify environment to reduce risk of injury  - Provide assistive devices as appropriate  - Consider OT/PT consult to assist with strengthening/mobility  - Encourage toileting schedule  Outcome: Progressing

## 2023-03-08 NOTE — PROGRESS NOTES
Received report from Cath. Nurse. Pt. Lying supine in bed. Femoral, radial, and pedal pulses all present. Bp and site assessment every 15 minutes. Right Site assessed: no crepitus, oozing, pain, or hematoma noted at the site. Pt. Is compliant.

## 2023-03-08 NOTE — PLAN OF CARE
Pt is A&Ox4. Anterior and posterior bilateral breath sounds clear. Pt denies pain,dizziness, or dyspnea. NSR, S1 and S2 present, murmur noted upon auscultation. Bilateral upper extremity pulses +2, bilateral lower extremity pulses +1. No edema or swelling noted. PCI scheduled this afternoon.

## 2023-03-08 NOTE — DIETARY NOTE
Clinical Nutrition    Dietitian consult received per cardiac rehab standing order. Pt to be educated by cardiac rehab staff and encouraged to attend outpatient classes taught by RD. RD available PRN.     Sha Rojas MS, RD, LDN  Clinical Dietitian  Pager #: [phone]

## 2023-03-09 VITALS
WEIGHT: 193.81 LBS | OXYGEN SATURATION: 97 % | SYSTOLIC BLOOD PRESSURE: 116 MMHG | DIASTOLIC BLOOD PRESSURE: 62 MMHG | BODY MASS INDEX: 29.37 KG/M2 | HEART RATE: 80 BPM | RESPIRATION RATE: 18 BRPM | HEIGHT: 68 IN | TEMPERATURE: 98 F

## 2023-03-09 LAB
ANION GAP SERPL CALC-SCNC: 6 MMOL/L (ref 0–18)
BASOPHILS # BLD AUTO: 0.06 X10(3) UL (ref 0–0.2)
BASOPHILS NFR BLD AUTO: 0.8 %
BUN BLD-MCNC: 28 MG/DL (ref 7–18)
CALCIUM BLD-MCNC: 9.5 MG/DL (ref 8.5–10.1)
CHLORIDE SERPL-SCNC: 105 MMOL/L (ref 98–112)
CO2 SERPL-SCNC: 24 MMOL/L (ref 21–32)
CREAT BLD-MCNC: 1.22 MG/DL
EOSINOPHIL # BLD AUTO: 0.46 X10(3) UL (ref 0–0.7)
EOSINOPHIL NFR BLD AUTO: 6.2 %
ERYTHROCYTE [DISTWIDTH] IN BLOOD BY AUTOMATED COUNT: 12.7 %
GFR SERPLBLD BASED ON 1.73 SQ M-ARVRAT: 61 ML/MIN/1.73M2 (ref 60–?)
GLUCOSE BLD-MCNC: 118 MG/DL (ref 70–99)
HCT VFR BLD AUTO: 46.9 %
HGB BLD-MCNC: 16.2 G/DL
IMM GRANULOCYTES # BLD AUTO: 0.01 X10(3) UL (ref 0–1)
IMM GRANULOCYTES NFR BLD: 0.1 %
LYMPHOCYTES # BLD AUTO: 1.32 X10(3) UL (ref 1–4)
LYMPHOCYTES NFR BLD AUTO: 17.9 %
MCH RBC QN AUTO: 35 PG (ref 26–34)
MCHC RBC AUTO-ENTMCNC: 34.5 G/DL (ref 31–37)
MCV RBC AUTO: 101.3 FL
MONOCYTES # BLD AUTO: 1.01 X10(3) UL (ref 0.1–1)
MONOCYTES NFR BLD AUTO: 13.7 %
NEUTROPHILS # BLD AUTO: 4.53 X10 (3) UL (ref 1.5–7.7)
NEUTROPHILS # BLD AUTO: 4.53 X10(3) UL (ref 1.5–7.7)
NEUTROPHILS NFR BLD AUTO: 61.3 %
OSMOLALITY SERPL CALC.SUM OF ELEC: 287 MOSM/KG (ref 275–295)
PLATELET # BLD AUTO: 229 10(3)UL (ref 150–450)
POTASSIUM SERPL-SCNC: 4.3 MMOL/L (ref 3.5–5.1)
POTASSIUM SERPL-SCNC: 4.3 MMOL/L (ref 3.5–5.1)
RBC # BLD AUTO: 4.63 X10(6)UL
SODIUM SERPL-SCNC: 135 MMOL/L (ref 136–145)
WBC # BLD AUTO: 7.4 X10(3) UL (ref 4–11)

## 2023-03-09 PROCEDURE — 85025 COMPLETE CBC W/AUTO DIFF WBC: CPT | Performed by: HOSPITALIST

## 2023-03-09 PROCEDURE — 84132 ASSAY OF SERUM POTASSIUM: CPT | Performed by: HOSPITALIST

## 2023-03-09 PROCEDURE — 80048 BASIC METABOLIC PNL TOTAL CA: CPT | Performed by: HOSPITALIST

## 2023-03-09 PROCEDURE — 99211 OFF/OP EST MAY X REQ PHY/QHP: CPT

## 2023-03-09 RX ORDER — CLOPIDOGREL BISULFATE 75 MG/1
75 TABLET ORAL DAILY
Qty: 30 TABLET | Refills: 11 | Status: SHIPPED | OUTPATIENT
Start: 2023-03-09

## 2023-03-09 RX ORDER — HYDRALAZINE HYDROCHLORIDE 50 MG/1
50 TABLET, FILM COATED ORAL EVERY 8 HOURS SCHEDULED
Qty: 90 TABLET | Refills: 3 | Status: SHIPPED | OUTPATIENT
Start: 2023-03-09

## 2023-03-09 RX ORDER — CARVEDILOL 6.25 MG/1
6.25 TABLET ORAL 2 TIMES DAILY WITH MEALS
Qty: 60 TABLET | Refills: 3 | Status: SHIPPED | OUTPATIENT
Start: 2023-03-09

## 2023-03-09 RX ORDER — ATORVASTATIN CALCIUM 40 MG/1
40 TABLET, FILM COATED ORAL NIGHTLY
Qty: 30 TABLET | Refills: 3 | Status: SHIPPED | OUTPATIENT
Start: 2023-03-09

## 2023-03-09 RX ORDER — FUROSEMIDE 20 MG/1
20 TABLET ORAL 2 TIMES DAILY
Qty: 30 TABLET | Refills: 3 | Status: SHIPPED | OUTPATIENT
Start: 2023-03-09

## 2023-03-09 RX ORDER — ASPIRIN 81 MG/1
81 TABLET ORAL DAILY
Qty: 30 TABLET | Refills: 11 | Status: SHIPPED | OUTPATIENT
Start: 2023-03-09

## 2023-03-09 RX ORDER — ISOSORBIDE DINITRATE 5 MG/1
5 TABLET ORAL
Qty: 90 TABLET | Refills: 3 | Status: SHIPPED | OUTPATIENT
Start: 2023-03-09

## 2023-03-09 NOTE — CARDIAC REHAB
CAD/HF education completed with patient and family. Patient referred to outpatient cardiac rehab but discussed starting after TAVR.

## 2023-03-09 NOTE — PLAN OF CARE
Assumed pt care at [address] [facility] [place]. A&O x4. Tele rhythm NSR. SPO2 96% on room air. Breath sounds clear bilaterally. Pt voiding with no issues. No c/o chest pain or shortness of breath. Right groin site soft. No hematoma, pedal pulses present. Pt refused SubQ heparin this evening and accucheck. Bed is locked and in low position. Call light and personal items within reach. Reviewed plan of care and patient verbalizes understanding. POC: Discharge tomorrow 3/9.    0515: Pt refused accucheck and heparin this morning. Problem: Patient/Family Goals  Goal: Patient/Family Long Term Goal  Description: Patient's Long Term Goal: \"find out plan for surgery\"    Interventions:  -[place] Karen to see  -Labs  - See additional Care Plan goals for specific interventions  Outcome: Progressing  Goal: Patient/Family Short Term Goal  Description: Patient's Short Term Goal: \"stay out of hospital\"    Interventions:   -Follow up appts  -Labs  -Med compliance  - See additional Care Plan goals for specific interventions  Outcome: Progressing     Problem: SAFETY ADULT - FALL  Goal: Free from fall injury  Description: INTERVENTIONS:  - Assess pt frequently for physical needs  - Identify cognitive and physical deficits and behaviors that affect risk of falls.   - [place] fall precautions as indicated by assessment.  - Educate pt/family on patient safety including physical limitations  - Instruct pt to call for assistance with activity based on assessment  - Modify environment to reduce risk of injury  - Provide assistive devices as appropriate  - Consider OT/PT consult to assist with strengthening/mobility  - Encourage toileting schedule  Outcome: Progressing

## 2023-03-09 NOTE — PLAN OF CARE
Assumed care for patient at 0730  AOx4. Independent. Up ad lisa. NSR on cardiac monitor, 1AVB. +murmur. Adequate saturation on RA. Lung sounds clear. Denies sob, chest discomfort, n/v.  Reports headache rating 3/10. Right groin site tender to touch, firm, bruising. Dressing c/d/I. Voiding without difficulty. LBM 3/7. Plan is for discharge. Problem: SAFETY ADULT - FALL  Goal: Free from fall injury  Description: INTERVENTIONS:  - Assess pt frequently for physical needs  - Identify cognitive and physical deficits and behaviors that affect risk of falls.   - [place] fall precautions as indicated by assessment.  - Educate pt/family on patient safety including physical limitations  - Instruct pt to call for assistance with activity based on assessment  - Modify environment to reduce risk of injury  - Provide assistive devices as appropriate  - Consider OT/PT consult to assist with strengthening/mobility  - Encourage toileting schedule  Outcome: Progressing

## 2023-03-10 ENCOUNTER — PATIENT OUTREACH (OUTPATIENT)
Dept: CASE MANAGEMENT | Age: 78
End: 2023-03-10

## 2023-03-10 NOTE — PROGRESS NOTES
Attempted to contact pt for TCM however no answer. Call continued to ring and did not go to a . [facility] to try again at a later time.

## 2023-03-13 NOTE — PROGRESS NOTES
Attempted to reach the patient to complete a [facility]-Hospital FU call. Left a message for the pt to call the NCM back at, [phone]. The number for the [facility] was also given to the patient to schedule at, [phone].

## 2023-03-16 ENCOUNTER — LAB ENCOUNTER (OUTPATIENT)
Dept: LAB | Age: 78
End: 2023-03-16
Attending: INTERNAL MEDICINE
Payer: MEDICARE

## 2023-03-16 DIAGNOSIS — I10 ESSENTIAL HYPERTENSION, MALIGNANT: Primary | ICD-10-CM

## 2023-03-16 DIAGNOSIS — E11.9 DIABETES MELLITUS (HCC): ICD-10-CM

## 2023-03-16 DIAGNOSIS — E78.00 PURE HYPERCHOLESTEROLEMIA: ICD-10-CM

## 2023-03-16 DIAGNOSIS — R42 DIZZINESS AND GIDDINESS: ICD-10-CM

## 2023-03-16 LAB
ALBUMIN SERPL-MCNC: 3.5 G/DL (ref 3.4–5)
ALBUMIN/GLOB SERPL: 0.8 {RATIO} (ref 1–2)
ALP LIVER SERPL-CCNC: 74 U/L
ALT SERPL-CCNC: 30 U/L
ANION GAP SERPL CALC-SCNC: 10 MMOL/L (ref 0–18)
AST SERPL-CCNC: 41 U/L (ref 15–37)
BASOPHILS # BLD AUTO: 0.08 X10(3) UL (ref 0–0.2)
BASOPHILS NFR BLD AUTO: 0.9 %
BILIRUB SERPL-MCNC: 0.9 MG/DL (ref 0.1–2)
BUN BLD-MCNC: 43 MG/DL (ref 7–18)
CALCIUM BLD-MCNC: 10 MG/DL (ref 8.5–10.1)
CHLORIDE SERPL-SCNC: 100 MMOL/L (ref 98–112)
CHOLEST SERPL-MCNC: 129 MG/DL (ref ?–200)
CO2 SERPL-SCNC: 28 MMOL/L (ref 21–32)
CREAT BLD-MCNC: 1.47 MG/DL
EOSINOPHIL # BLD AUTO: 0.82 X10(3) UL (ref 0–0.7)
EOSINOPHIL NFR BLD AUTO: 8.8 %
ERYTHROCYTE [DISTWIDTH] IN BLOOD BY AUTOMATED COUNT: 12.5 %
FASTING PATIENT LIPID ANSWER: YES
FASTING STATUS PATIENT QL REPORTED: YES
GFR SERPLBLD BASED ON 1.73 SQ M-ARVRAT: 49 ML/MIN/1.73M2 (ref 60–?)
GLOBULIN PLAS-MCNC: 4.6 G/DL (ref 2.8–4.4)
GLUCOSE BLD-MCNC: 138 MG/DL (ref 70–99)
HCT VFR BLD AUTO: 46.4 %
HDLC SERPL-MCNC: 46 MG/DL (ref 40–59)
HGB BLD-MCNC: 16 G/DL
IMM GRANULOCYTES # BLD AUTO: 0.02 X10(3) UL (ref 0–1)
IMM GRANULOCYTES NFR BLD: 0.2 %
LDLC SERPL CALC-MCNC: 65 MG/DL (ref ?–100)
LYMPHOCYTES # BLD AUTO: 1.62 X10(3) UL (ref 1–4)
LYMPHOCYTES NFR BLD AUTO: 17.3 %
MCH RBC QN AUTO: 33.9 PG (ref 26–34)
MCHC RBC AUTO-ENTMCNC: 34.5 G/DL (ref 31–37)
MCV RBC AUTO: 98.3 FL
MONOCYTES # BLD AUTO: 1.19 X10(3) UL (ref 0.1–1)
MONOCYTES NFR BLD AUTO: 12.7 %
NEUTROPHILS # BLD AUTO: 5.63 X10 (3) UL (ref 1.5–7.7)
NEUTROPHILS # BLD AUTO: 5.63 X10(3) UL (ref 1.5–7.7)
NEUTROPHILS NFR BLD AUTO: 60.1 %
NONHDLC SERPL-MCNC: 83 MG/DL (ref ?–130)
NT-PROBNP SERPL-MCNC: ABNORMAL PG/ML (ref ?–450)
OSMOLALITY SERPL CALC.SUM OF ELEC: 299 MOSM/KG (ref 275–295)
PLATELET # BLD AUTO: 333 10(3)UL (ref 150–450)
POTASSIUM SERPL-SCNC: 3.5 MMOL/L (ref 3.5–5.1)
PROT SERPL-MCNC: 8.1 G/DL (ref 6.4–8.2)
RBC # BLD AUTO: 4.72 X10(6)UL
SODIUM SERPL-SCNC: 138 MMOL/L (ref 136–145)
T4 FREE SERPL-MCNC: 1.3 NG/DL (ref 0.8–1.7)
TRIGL SERPL-MCNC: 97 MG/DL (ref 30–149)
TSI SER-ACNC: 3.39 MIU/ML (ref 0.36–3.74)
VLDLC SERPL CALC-MCNC: 15 MG/DL (ref 0–30)
WBC # BLD AUTO: 9.4 X10(3) UL (ref 4–11)

## 2023-03-16 PROCEDURE — 36415 COLL VENOUS BLD VENIPUNCTURE: CPT

## 2023-03-16 PROCEDURE — 84439 ASSAY OF FREE THYROXINE: CPT

## 2023-03-16 PROCEDURE — 84443 ASSAY THYROID STIM HORMONE: CPT

## 2023-03-16 PROCEDURE — 80053 COMPREHEN METABOLIC PANEL: CPT

## 2023-03-16 PROCEDURE — 80061 LIPID PANEL: CPT

## 2023-03-16 PROCEDURE — 83880 ASSAY OF NATRIURETIC PEPTIDE: CPT

## 2023-03-16 PROCEDURE — 85025 COMPLETE CBC W/AUTO DIFF WBC: CPT

## 2023-03-21 NOTE — PLAN OF CARE
Assumed pt care at 2111. A&O x4. Tele rhythm NSR. SPO2 93% on 2.5L O2. Breath sounds clear and diminished bilaterally at bases. Pt voiding with no issues. No c/o chest pain or shortness of breath. Skin dry and intact. Edema in BLE. Bed is locked and in low position. Call light and personal items within reach. Reviewed plan of care and patient verbalizes understanding. Problem: Patient/Family Goals  Goal: Patient/Family Long Term Goal  Description: Patient's Long Term Goal: Free from edema, chest pain and shortness of breath    Interventions:  - Didorise, cardiology to discuss plan with pt   - See additional Care Plan goals for specific interventions  Outcome: Progressing  Goal: Patient/Family Short Term Goal  Description: Patient's Short Term Goal: Go home free of chest pain and shortness of breath    Interventions:   - Cardiology to see for plan 3/2  - See additional Care Plan goals for specific interventions  Outcome: Progressing     Problem: SAFETY ADULT - FALL  Goal: Free from fall injury  Description: INTERVENTIONS:  - Assess pt frequently for physical needs  - Identify cognitive and physical deficits and behaviors that affect risk of falls.   - [place] fall precautions as indicated by assessment.  - Educate pt/family on patient safety including physical limitations  - Instruct pt to call for assistance with activity based on assessment  - Modify environment to reduce risk of injury  - Provide assistive devices as appropriate  - Consider OT/PT consult to assist with strengthening/mobility  - Encourage toileting schedule  Outcome: Progressing [employer] PO

## 2023-03-25 ENCOUNTER — HOSPITAL ENCOUNTER (OUTPATIENT)
Facility: HOSPITAL | Age: 78
Setting detail: OBSERVATION
Discharge: HOME | End: 2023-03-27
Attending: EMERGENCY MEDICINE | Admitting: HOSPITALIST
Payer: MEDICARE

## 2023-03-25 ENCOUNTER — APPOINTMENT (OUTPATIENT)
Dept: CT IMAGING | Facility: HOSPITAL | Age: 78
End: 2023-03-25
Attending: EMERGENCY MEDICINE
Payer: MEDICARE

## 2023-03-25 DIAGNOSIS — K59.00 CONSTIPATION, UNSPECIFIED CONSTIPATION TYPE: Primary | ICD-10-CM

## 2023-03-25 DIAGNOSIS — R33.9 URINARY RETENTION: ICD-10-CM

## 2023-03-25 PROBLEM — E87.1 HYPONATREMIA: Status: ACTIVE | Noted: 2023-03-25

## 2023-03-25 PROBLEM — E87.6 HYPOKALEMIA: Status: ACTIVE | Noted: 2023-03-25

## 2023-03-25 PROBLEM — R73.9 HYPERGLYCEMIA: Status: ACTIVE | Noted: 2023-03-25

## 2023-03-25 LAB
ALBUMIN SERPL-MCNC: 3.6 G/DL (ref 3.4–5)
ALBUMIN/GLOB SERPL: 0.7 {RATIO} (ref 1–2)
ALP LIVER SERPL-CCNC: 74 U/L
ALT SERPL-CCNC: 25 U/L
ANION GAP SERPL CALC-SCNC: 8 MMOL/L (ref 0–18)
AST SERPL-CCNC: 32 U/L (ref 15–37)
BASOPHILS # BLD AUTO: 0.1 X10(3) UL (ref 0–0.2)
BASOPHILS NFR BLD AUTO: 0.8 %
BILIRUB SERPL-MCNC: 0.8 MG/DL (ref 0.1–2)
BUN BLD-MCNC: 43 MG/DL (ref 7–18)
CALCIUM BLD-MCNC: 10.1 MG/DL (ref 8.5–10.1)
CHLORIDE SERPL-SCNC: 99 MMOL/L (ref 98–112)
CO2 SERPL-SCNC: 26 MMOL/L (ref 21–32)
CREAT BLD-MCNC: 1.5 MG/DL
EOSINOPHIL # BLD AUTO: 0.21 X10(3) UL (ref 0–0.7)
EOSINOPHIL NFR BLD AUTO: 1.6 %
ERYTHROCYTE [DISTWIDTH] IN BLOOD BY AUTOMATED COUNT: 12.2 %
GFR SERPLBLD BASED ON 1.73 SQ M-ARVRAT: 47 ML/MIN/1.73M2 (ref 60–?)
GLOBULIN PLAS-MCNC: 4.9 G/DL (ref 2.8–4.4)
GLUCOSE BLD-MCNC: 137 MG/DL (ref 70–99)
HCT VFR BLD AUTO: 45.6 %
HGB BLD-MCNC: 15.9 G/DL
IMM GRANULOCYTES # BLD AUTO: 0.04 X10(3) UL (ref 0–1)
IMM GRANULOCYTES NFR BLD: 0.3 %
LIPASE SERPL-CCNC: 223 U/L (ref 13–75)
LYMPHOCYTES # BLD AUTO: 1.66 X10(3) UL (ref 1–4)
LYMPHOCYTES NFR BLD AUTO: 12.9 %
MCH RBC QN AUTO: 34 PG (ref 26–34)
MCHC RBC AUTO-ENTMCNC: 34.9 G/DL (ref 31–37)
MCV RBC AUTO: 97.6 FL
MONOCYTES # BLD AUTO: 1.16 X10(3) UL (ref 0.1–1)
MONOCYTES NFR BLD AUTO: 9 %
NEUTROPHILS # BLD AUTO: 9.65 X10 (3) UL (ref 1.5–7.7)
NEUTROPHILS # BLD AUTO: 9.65 X10(3) UL (ref 1.5–7.7)
NEUTROPHILS NFR BLD AUTO: 75.4 %
OSMOLALITY SERPL CALC.SUM OF ELEC: 289 MOSM/KG (ref 275–295)
PLATELET # BLD AUTO: 321 10(3)UL (ref 150–450)
POTASSIUM SERPL-SCNC: 3.4 MMOL/L (ref 3.5–5.1)
PROT SERPL-MCNC: 8.5 G/DL (ref 6.4–8.2)
RBC # BLD AUTO: 4.67 X10(6)UL
SARS-COV-2 RNA RESP QL NAA+PROBE: NOT DETECTED
SODIUM SERPL-SCNC: 133 MMOL/L (ref 136–145)
WBC # BLD AUTO: 12.8 X10(3) UL (ref 4–11)

## 2023-03-25 PROCEDURE — 85025 COMPLETE CBC W/AUTO DIFF WBC: CPT | Performed by: EMERGENCY MEDICINE

## 2023-03-25 PROCEDURE — 80053 COMPREHEN METABOLIC PANEL: CPT | Performed by: EMERGENCY MEDICINE

## 2023-03-25 PROCEDURE — 74177 CT ABD & PELVIS W/CONTRAST: CPT | Performed by: EMERGENCY MEDICINE

## 2023-03-25 PROCEDURE — 36415 COLL VENOUS BLD VENIPUNCTURE: CPT | Performed by: EMERGENCY MEDICINE

## 2023-03-25 PROCEDURE — 81001 URINALYSIS AUTO W/SCOPE: CPT | Performed by: EMERGENCY MEDICINE

## 2023-03-25 PROCEDURE — 99285 EMERGENCY DEPT VISIT HI MDM: CPT | Performed by: EMERGENCY MEDICINE

## 2023-03-25 PROCEDURE — 83690 ASSAY OF LIPASE: CPT | Performed by: EMERGENCY MEDICINE

## 2023-03-25 RX ORDER — BISACODYL 10 MG
10 SUPPOSITORY, RECTAL RECTAL
Status: DISCONTINUED | OUTPATIENT
Start: 2023-03-25 | End: 2023-03-27

## 2023-03-26 PROBLEM — R33.9 URINARY RETENTION: Status: ACTIVE | Noted: 2023-03-26

## 2023-03-26 LAB
ANION GAP SERPL CALC-SCNC: 10 MMOL/L (ref 0–18)
BILIRUB UR QL STRIP.AUTO: NEGATIVE
BUN BLD-MCNC: 43 MG/DL (ref 7–18)
CALCIUM BLD-MCNC: 9.1 MG/DL (ref 8.5–10.1)
CHLORIDE SERPL-SCNC: 102 MMOL/L (ref 98–112)
CLARITY UR REFRACT.AUTO: CLEAR
CO2 SERPL-SCNC: 22 MMOL/L (ref 21–32)
COLOR UR AUTO: YELLOW
CREAT BLD-MCNC: 1.46 MG/DL
GFR SERPLBLD BASED ON 1.73 SQ M-ARVRAT: 49 ML/MIN/1.73M2 (ref 60–?)
GLUCOSE BLD-MCNC: 127 MG/DL (ref 70–99)
GLUCOSE UR STRIP.AUTO-MCNC: 50 MG/DL
HYALINE CASTS #/AREA URNS AUTO: PRESENT /LPF
LEUKOCYTE ESTERASE UR QL STRIP.AUTO: NEGATIVE
MAGNESIUM SERPL-MCNC: 2 MG/DL (ref 1.6–2.6)
NITRITE UR QL STRIP.AUTO: NEGATIVE
OSMOLALITY SERPL CALC.SUM OF ELEC: 290 MOSM/KG (ref 275–295)
PH UR STRIP.AUTO: 5 [PH] (ref 5–8)
POTASSIUM SERPL-SCNC: 2.9 MMOL/L (ref 3.5–5.1)
POTASSIUM SERPL-SCNC: 3.5 MMOL/L (ref 3.5–5.1)
PROT UR STRIP.AUTO-MCNC: 30 MG/DL
RBC UR QL AUTO: NEGATIVE
SODIUM SERPL-SCNC: 134 MMOL/L (ref 136–145)
SP GR UR STRIP.AUTO: 1.01 (ref 1–1.03)
UROBILINOGEN UR STRIP.AUTO-MCNC: <2 MG/DL

## 2023-03-26 PROCEDURE — 83735 ASSAY OF MAGNESIUM: CPT | Performed by: HOSPITALIST

## 2023-03-26 PROCEDURE — 80048 BASIC METABOLIC PNL TOTAL CA: CPT | Performed by: HOSPITALIST

## 2023-03-26 PROCEDURE — 84132 ASSAY OF SERUM POTASSIUM: CPT | Performed by: HOSPITALIST

## 2023-03-26 RX ORDER — HEPARIN SODIUM 5000 [USP'U]/ML
5000 INJECTION, SOLUTION INTRAVENOUS; SUBCUTANEOUS EVERY 12 HOURS SCHEDULED
Status: DISCONTINUED | OUTPATIENT
Start: 2023-03-26 | End: 2023-03-27

## 2023-03-26 RX ORDER — MINERAL OIL 100 G/100G
1 OIL RECTAL ONCE AS NEEDED
Status: ACTIVE | OUTPATIENT
Start: 2023-03-26 | End: 2023-03-26

## 2023-03-26 RX ORDER — SENNOSIDES 8.6 MG
17.2 TABLET ORAL NIGHTLY PRN
Status: DISCONTINUED | OUTPATIENT
Start: 2023-03-26 | End: 2023-03-27

## 2023-03-26 RX ORDER — ISOSORBIDE DINITRATE 5 MG/1
5 TABLET ORAL
Status: DISCONTINUED | OUTPATIENT
Start: 2023-03-26 | End: 2023-03-27

## 2023-03-26 RX ORDER — ONDANSETRON 2 MG/ML
4 INJECTION INTRAMUSCULAR; INTRAVENOUS EVERY 6 HOURS PRN
Status: DISCONTINUED | OUTPATIENT
Start: 2023-03-26 | End: 2023-03-27

## 2023-03-26 RX ORDER — CARVEDILOL 6.25 MG/1
6.25 TABLET ORAL 2 TIMES DAILY WITH MEALS
Status: DISCONTINUED | OUTPATIENT
Start: 2023-03-26 | End: 2023-03-27

## 2023-03-26 RX ORDER — POTASSIUM CHLORIDE 20 MEQ/1
40 TABLET, EXTENDED RELEASE ORAL ONCE
Status: COMPLETED | OUTPATIENT
Start: 2023-03-26 | End: 2023-03-26

## 2023-03-26 RX ORDER — POTASSIUM CHLORIDE 1.5 G/1.77G
40 POWDER, FOR SOLUTION ORAL EVERY 4 HOURS
Status: DISCONTINUED | OUTPATIENT
Start: 2023-03-26 | End: 2023-03-26 | Stop reason: SDUPTHER

## 2023-03-26 RX ORDER — POLYETHYLENE GLYCOL 3350 17 G/17G
17 POWDER, FOR SOLUTION ORAL DAILY PRN
Status: DISCONTINUED | OUTPATIENT
Start: 2023-03-26 | End: 2023-03-27

## 2023-03-26 RX ORDER — ACETAMINOPHEN 500 MG
500 TABLET ORAL EVERY 4 HOURS PRN
Status: DISCONTINUED | OUTPATIENT
Start: 2023-03-26 | End: 2023-03-27

## 2023-03-26 RX ORDER — POTASSIUM CHLORIDE 20 MEQ/1
40 TABLET, EXTENDED RELEASE ORAL EVERY 4 HOURS
Status: COMPLETED | OUTPATIENT
Start: 2023-03-26 | End: 2023-03-27

## 2023-03-26 RX ORDER — ASPIRIN 81 MG/1
81 TABLET ORAL DAILY
Status: DISCONTINUED | OUTPATIENT
Start: 2023-03-26 | End: 2023-03-27

## 2023-03-26 RX ORDER — FUROSEMIDE 20 MG/1
20 TABLET ORAL
Status: DISCONTINUED | OUTPATIENT
Start: 2023-03-26 | End: 2023-03-27

## 2023-03-26 RX ORDER — CLOPIDOGREL BISULFATE 75 MG/1
75 TABLET ORAL DAILY
Status: DISCONTINUED | OUTPATIENT
Start: 2023-03-26 | End: 2023-03-27

## 2023-03-26 RX ORDER — HYDRALAZINE HYDROCHLORIDE 50 MG/1
50 TABLET, FILM COATED ORAL EVERY 8 HOURS SCHEDULED
Status: DISCONTINUED | OUTPATIENT
Start: 2023-03-26 | End: 2023-03-27

## 2023-03-26 RX ORDER — METOCLOPRAMIDE HYDROCHLORIDE 5 MG/ML
5 INJECTION INTRAMUSCULAR; INTRAVENOUS EVERY 8 HOURS PRN
Status: DISCONTINUED | OUTPATIENT
Start: 2023-03-26 | End: 2023-03-27

## 2023-03-26 RX ORDER — BISACODYL 10 MG
10 SUPPOSITORY, RECTAL RECTAL
Status: DISCONTINUED | OUTPATIENT
Start: 2023-03-26 | End: 2023-03-27

## 2023-03-26 RX ORDER — ATORVASTATIN CALCIUM 40 MG/1
40 TABLET, FILM COATED ORAL NIGHTLY
Status: DISCONTINUED | OUTPATIENT
Start: 2023-03-26 | End: 2023-03-27

## 2023-03-26 NOTE — ED INITIAL ASSESSMENT (HPI)
Patient arrives via EMS from home with constipation. States he hasn't had a bowel movement since 3/13. Is adiment he has colon cancer, which wife states isn't true. Patient states he's here to be admitted to hospice.

## 2023-03-26 NOTE — PLAN OF CARE
Pt is A/Ox3-4, although has poor judgment and irrational thoughts/ decision making at times. On room air, NSR on tele, incontinent of bowel, having soft stools, sears in place, output maintained. Pt denies having pain. Has been in bed throughout shift and has refused to get up with assistance, pt stated he is too weak due to his illness. Other times has asked if he is allowed to get up without staff in the room. Wife at bedside beginning of shift. Became increasingly uncooperative/agitated toward end of shift, pt refusing all cares, including removal of bedpan for a couple hours. Skin redness present once where bedpan was once pt allowed removal. Pt accused staff of being negligent and refusing to remove bedpan, although multiple staff members were in the room together during these encounters, including PCTs and RN. Pt refusing afternoon meds as well lab draw for potassium recheck. Also stated that he no longer wants any further care and demands to be sent home on palliative/ hospice.     Problem: Patient/Family Goals  Goal: Patient/Family Long Term Goal  Description: Patient's Long Term Goal:  RESOLUTION OF CONSTIPATION AND URINARY RETENTION    Interventions:  - ENEMA/LAXATIVES  as NEEDED  - SEARS CATHETER FOR NOW  - See additional Care Plan goals for specific interventions  Outcome: Progressing     Problem: Patient/Family Goals  Goal: Patient/Family Short Term Goal  Description: Patient's Short Term Goal:  03/26/2023 - \"FINISH MOVING MY BOWELS AND GET CLEANED UP\"  3/26: manage constipation and incontinence     Interventions:   - KEEP PATIENT CLEAN AND DRY   - See additional Care Plan goals for specific interventions  Outcome: Progressing

## 2023-03-26 NOTE — PROGRESS NOTES
03/26/23 1523   Clinical Encounter Type   Visited With Health care provider   Continue Visiting No     Responded to request for HCPOA. RN stated that patient is not able to make decisions at this time in reference to [facility]. Spiritual Care support can be requested via an Epic consult.

## 2023-03-26 NOTE — PROGRESS NOTES
NURSING ADMISSION NOTE    Patient  Received from the E.D.  via Cart and admitted to room 519 Dx: Constipation. He is A&Ox2-3. Screaming on and off but denies pain. He says he is screaming because he is not on [address],  He is accompanied by his wife, Abi Park who is supportive and helpful in his care. He denies SOB/chest pain. No nausea. Hernandez catheter in place draining clear yellow urine. Incontinent of moderate amount of loose stool (he received an  enema in the the E.D.). He was cleaned and dried. + bruising to his groin area,  Wife reports he had a recent stent placement. Oriented to room. Safety precautions initiated. Bed in low position. Call light in reach.

## 2023-03-26 NOTE — ED QUICK NOTES
Patient arrives from home via EMS. Accompanied by wife. Per EMS, patient called them requesting to be taken to hospice. Patient is alert, oriented. Odd affect. States he has colon cancer, is unable to have BM. Constipation. States his last BM was 3/13. Patient was admitted here this month, had coronary cath & stent placement. Patient states he's not passing gas, has rectal pain. Patient does have a history of colon cancer 20 years ago, had colon resection. State he never went back for a colonoscopy ever again. Patient's abdomen is soft. Patient states his entire abdomen is painful. Claims he's been attempting to have a BM for the last 5 hours without success. Tried OTC laxatives without relief.

## 2023-03-26 NOTE — PROGRESS NOTES
PTA MED LIST IS INCOMPLETE AT THIS TIME as PATIENT AND HIS WIFE ARE UNABLE TO RECALL WHAT OTHER MEDICATIONS HE TAKES.    WIFE WILL CALL WITH A COMPLETE LIST IN AM.

## 2023-03-26 NOTE — ED QUICK NOTES
Orders for admission, patient is aware of plan and ready to go upstairs. Any questions, please call ED RN Desi Ko at extension [phone]. Patient Covid vaccination status: Fully vaccinated     COVID Test Ordered in ED: Rapid SARS-CoV-2 by PCR    COVID Suspicion at Admission: Low clinical suspicion for COVID    Running Infusions:  None    Mental Status/LOC at time of transport: Alert, oriented. Adiment that he needs palliative care & hospice although patient has no terminal illnesses. Confused as to his health situation. Hx of UTI with confusion per wife. Patient given enema, only liquid enema output--no actual stool. Patient refused to continue to attempt to have BM. Would rather have diaper on & be admitted for [facility] medicine. \" Hernandez placed for urinary retention. Other pertinent information: Patient states he's not able to walk, and is dying of cancer, but patient was able to ambulate in bathroom without assistance.   CIWA score: N/A   NIH score:  N/A

## 2023-03-27 VITALS
SYSTOLIC BLOOD PRESSURE: 132 MMHG | DIASTOLIC BLOOD PRESSURE: 64 MMHG | HEART RATE: 81 BPM | TEMPERATURE: 98 F | WEIGHT: 193.63 LBS | BODY MASS INDEX: 29 KG/M2 | OXYGEN SATURATION: 98 % | RESPIRATION RATE: 17 BRPM

## 2023-03-27 LAB
ALBUMIN SERPL-MCNC: 2.8 G/DL (ref 3.4–5)
ALBUMIN/GLOB SERPL: 0.7 {RATIO} (ref 1–2)
ALP LIVER SERPL-CCNC: 55 U/L
ALT SERPL-CCNC: 20 U/L
ANION GAP SERPL CALC-SCNC: 5 MMOL/L (ref 0–18)
AST SERPL-CCNC: 34 U/L (ref 15–37)
BASOPHILS # BLD AUTO: 0.11 X10(3) UL (ref 0–0.2)
BASOPHILS NFR BLD AUTO: 1.1 %
BILIRUB SERPL-MCNC: 0.7 MG/DL (ref 0.1–2)
BUN BLD-MCNC: 42 MG/DL (ref 7–18)
CALCIUM BLD-MCNC: 9.1 MG/DL (ref 8.5–10.1)
CHLORIDE SERPL-SCNC: 104 MMOL/L (ref 98–112)
CO2 SERPL-SCNC: 26 MMOL/L (ref 21–32)
CREAT BLD-MCNC: 1.49 MG/DL
EOSINOPHIL # BLD AUTO: 0.45 X10(3) UL (ref 0–0.7)
EOSINOPHIL NFR BLD AUTO: 4.4 %
ERYTHROCYTE [DISTWIDTH] IN BLOOD BY AUTOMATED COUNT: 12.5 %
GFR SERPLBLD BASED ON 1.73 SQ M-ARVRAT: 48 ML/MIN/1.73M2 (ref 60–?)
GLOBULIN PLAS-MCNC: 3.8 G/DL (ref 2.8–4.4)
GLUCOSE BLD-MCNC: 101 MG/DL (ref 70–99)
HCT VFR BLD AUTO: 37.5 %
HGB BLD-MCNC: 13.4 G/DL
IMM GRANULOCYTES # BLD AUTO: 0.03 X10(3) UL (ref 0–1)
IMM GRANULOCYTES NFR BLD: 0.3 %
LYMPHOCYTES # BLD AUTO: 1.97 X10(3) UL (ref 1–4)
LYMPHOCYTES NFR BLD AUTO: 19.1 %
MAGNESIUM SERPL-MCNC: 2 MG/DL (ref 1.6–2.6)
MCH RBC QN AUTO: 34.4 PG (ref 26–34)
MCHC RBC AUTO-ENTMCNC: 35.7 G/DL (ref 31–37)
MCV RBC AUTO: 96.4 FL
MONOCYTES # BLD AUTO: 1.07 X10(3) UL (ref 0.1–1)
MONOCYTES NFR BLD AUTO: 10.4 %
NEUTROPHILS # BLD AUTO: 6.67 X10 (3) UL (ref 1.5–7.7)
NEUTROPHILS # BLD AUTO: 6.67 X10(3) UL (ref 1.5–7.7)
NEUTROPHILS NFR BLD AUTO: 64.7 %
OSMOLALITY SERPL CALC.SUM OF ELEC: 291 MOSM/KG (ref 275–295)
PLATELET # BLD AUTO: 249 10(3)UL (ref 150–450)
POTASSIUM SERPL-SCNC: 4.1 MMOL/L (ref 3.5–5.1)
POTASSIUM SERPL-SCNC: 4.1 MMOL/L (ref 3.5–5.1)
PROT SERPL-MCNC: 6.6 G/DL (ref 6.4–8.2)
RBC # BLD AUTO: 3.89 X10(6)UL
SODIUM SERPL-SCNC: 135 MMOL/L (ref 136–145)
WBC # BLD AUTO: 10.3 X10(3) UL (ref 4–11)

## 2023-03-27 PROCEDURE — 84132 ASSAY OF SERUM POTASSIUM: CPT | Performed by: HOSPITALIST

## 2023-03-27 PROCEDURE — 83735 ASSAY OF MAGNESIUM: CPT | Performed by: HOSPITALIST

## 2023-03-27 PROCEDURE — 85025 COMPLETE CBC W/AUTO DIFF WBC: CPT | Performed by: HOSPITALIST

## 2023-03-27 PROCEDURE — 80053 COMPREHEN METABOLIC PANEL: CPT | Performed by: HOSPITALIST

## 2023-03-27 RX ORDER — POLYETHYLENE GLYCOL 3350 17 G/17G
17 POWDER, FOR SOLUTION ORAL DAILY PRN
Qty: 10 EACH | Refills: 0 | Status: SHIPPED | OUTPATIENT
Start: 2023-03-27

## 2023-03-27 RX ORDER — POLYETHYLENE GLYCOL 3350 17 G/17G
17 POWDER, FOR SOLUTION ORAL DAILY PRN
Qty: 10 EACH | Refills: 0 | Status: SHIPPED | OUTPATIENT
Start: 2023-03-27 | End: 2023-03-27

## 2023-03-27 NOTE — PROGRESS NOTES
NURSING DISCHARGE NOTE    Discharged Home via Wheelchair. Accompanied by Support staff  Belongings Taken by patient/family.     - Removed Tele and IV  - Completed education and discharge teaching  - Patient will follow up with PCP  - Patient taken home via private vehicle

## 2023-03-27 NOTE — PROGRESS NOTES
PATIENT A&OX2-3. PLEASANT AND COOPERATIVE TONIGHT. \"I SPOKE TO MY DAUGHTER ON THE PHONE.  SHE'S A  AND SHE SCOLDED ME.  MY WIFE SCOLDED ME TOO. SO, ILL DO WHAT I'M TOLD NOW. MY DAUGHTER SAID IF IM NICE I CAN GO HOME. YOU CAN GIVE ME THE MEDICINES THAT YOU HAVE TO GIVE ME AND ILL TAKE THEM\". HE DENIES PAIN/DISCOMFORT. SATURATING ABOVE 90% ON ROOM AIR WHILE AWAKE. NO COUGH. DENIES NAUSEA, NO VOMITING. HAD A SMALL AMOUNT OF SOFT BROWN STOOL X1.  FISH CATHETER IN PLACE DRAINING CLEAR YELLOW URINE. NO COMPLAINTS OF PAIN. AFEBRILE.

## 2023-03-27 NOTE — PLAN OF CARE
DC psych consult. Consult Dr. Mable Kovacs for neuropsych testing to assess for decision making capacity.      Dr. Delfina Chan

## 2023-03-28 ENCOUNTER — PATIENT OUTREACH (OUTPATIENT)
Dept: CASE MANAGEMENT | Age: 78
End: 2023-03-28

## 2023-03-28 NOTE — PROGRESS NOTES
TCM chart review. No TCM as patient follows with outside [facility] PCP. Encounter closing.

## 2023-03-30 ENCOUNTER — EXTERNAL RECORD (OUTPATIENT)
Dept: OTHER | Age: 78
End: 2023-03-30

## 2023-03-30 PROCEDURE — 93010 ELECTROCARDIOGRAM REPORT: CPT | Performed by: INTERNAL MEDICINE

## 2023-03-31 ENCOUNTER — EXTERNAL RECORD (OUTPATIENT)
Dept: OTHER | Facility: PHYSICIAN GROUP | Age: 78
End: 2023-03-31

## 2023-03-31 LAB
*MEAN CORPUSCULAR HGB CONC: 34 G/DL (ref 32.5–35.8)
ANION GAP: 9 MEQ/L (ref 6.2–14.7)
BASOPHIL AUTOMATED: 0.8 %
BASOPHILS: 0.1 (ref 0–0.14)
BEDSIDE GLUCOSE: 136 MG/DL (ref 70–110)
BEDSIDE GLUCOSE: 147 MG/DL (ref 70–110)
BEDSIDE GLUCOSE: 155 MG/DL (ref 70–110)
BEDSIDE GLUCOSE: 192 MG/DL (ref 70–110)
BLOOD UREA NITROGEN (BUN): 35 MG/DL (ref 7–25)
BUN/CREATININE RATIO: 27.3 (ref 7.4–23)
CALCIUM, SERUM: 9.5 MG/DL (ref 8.6–10.3)
CARBON DIOXIDE: 30 MEQ/L (ref 21–31)
CHLORIDE, SERUM: 101 MMOL/L (ref 98–107)
CHRONIC KIDNEY DISEASE STAGE: ABNORMAL
CREATININE: 1.28 MG/DL (ref 0.7–1.3)
EOSINOPHIL AUTOMATED: 6.2 %
EOSINOPHILS: 0.8 (ref 0–0.6)
EST GLOMERULAR FILTRATION RATE: 57 ML/MIN
ESTIMATED AVERAGE GLUCOSE: 137 MG/DL
GLUCOSE: 94 MG/DL (ref 70–99)
HEMATOCRIT: 42 % (ref 38.6–49.2)
HEMOGLOBIN A1C (GLYCOSYLATED): 6.4 %
HEMOGLOBIN: 14.3 GM/DL (ref 13–17)
K (POTASSIUM, SERUM): 3.3 MMOL/L (ref 3.5–5.2)
K (POTASSIUM, SERUM): 3.3 MMOL/L (ref 3.5–5.2)
LYMPHOCYTE AUTOMATED: 12 %
LYMPHOCYTES: 1.5 (ref 0.78–3.73)
MEAN CORPUSCULAR HGB: 34.3 PG (ref 26–34)
MEAN CORPUSCULAR VOL: 100.9 FL (ref 80–100)
MEAN PLATELET VOLUME: 9.3 FL (ref 6.8–10.2)
MG (MAGNESIUM, SERUM: 1.9 MG/DL (ref 1.6–2.6)
MONOCYTE AUTOMATED: 8.9 %
MONOCYTES: 1.1 (ref 0.17–1)
NA (SODIUM, SERUM): 140 MMOL/L (ref 133–144)
NEUTROPHIL ABSOLUTE: 9.1 (ref 1.91–7.6)
NEUTROPHIL AUTOMATED: 72.1 %
PLATELET COUNT: 236 K/MM3 (ref 150–450)
RED BLOOD CELL COUNT: 4.16 M/MM3 (ref 4.34–5.6)
RED CELL DISTRIBUTIO: 13.4 % (ref 11.9–15.9)
WHITE BLOOD CELL COU: 12.6 K/MM3 (ref 4–11)

## 2023-03-31 PROCEDURE — 99223 1ST HOSP IP/OBS HIGH 75: CPT | Performed by: INTERNAL MEDICINE

## 2023-03-31 PROCEDURE — 93306 TTE W/DOPPLER COMPLETE: CPT | Performed by: INTERNAL MEDICINE

## 2023-04-01 ENCOUNTER — EXTERNAL RECORD (OUTPATIENT)
Dept: OTHER | Age: 78
End: 2023-04-01

## 2023-04-01 LAB
*MEAN CORPUSCULAR HGB CONC: 34.7 G/DL (ref 32.5–35.8)
ANION GAP: 8 MEQ/L (ref 6.2–14.7)
BASOPHIL AUTOMATED: 1 %
BASOPHILS: 0.1 (ref 0–0.14)
BEDSIDE GLUCOSE: 117 MG/DL (ref 70–110)
BEDSIDE GLUCOSE: 119 MG/DL (ref 70–110)
BEDSIDE GLUCOSE: 129 MG/DL (ref 70–110)
BEDSIDE GLUCOSE: 210 MG/DL (ref 70–110)
BLOOD UREA NITROGEN (BUN): 33 MG/DL (ref 7–25)
BUN/CREATININE RATIO: 26 (ref 7.4–23)
CALCIUM, SERUM: 8.8 MG/DL (ref 8.6–10.3)
CARBON DIOXIDE: 26 MEQ/L (ref 21–31)
CHLORIDE, SERUM: 104 MMOL/L (ref 98–107)
CHRONIC KIDNEY DISEASE STAGE: ABNORMAL
CREATININE: 1.27 MG/DL (ref 0.7–1.3)
EOSINOPHIL AUTOMATED: 9.2 %
EOSINOPHILS: 0.9 (ref 0–0.6)
EST GLOMERULAR FILTRATION RATE: 58 ML/MIN
GLUCOSE: 122 MG/DL (ref 70–99)
HEMATOCRIT: 37.9 % (ref 38.6–49.2)
HEMOGLOBIN: 13.1 GM/DL (ref 13–17)
K (POTASSIUM, SERUM): 3.6 MMOL/L (ref 3.5–5.2)
LYMPHOCYTE AUTOMATED: 18.7 %
LYMPHOCYTES: 1.9 (ref 0.78–3.73)
MEAN CORPUSCULAR HGB: 34.6 PG (ref 26–34)
MEAN CORPUSCULAR VOL: 99.7 FL (ref 80–100)
MEAN PLATELET VOLUME: 9.3 FL (ref 6.8–10.2)
MONOCYTE AUTOMATED: 12.5 %
MONOCYTES: 1.3 (ref 0.17–1)
NA (SODIUM, SERUM): 138 MMOL/L (ref 133–144)
NEUTROPHIL ABSOLUTE: 5.9 (ref 1.91–7.6)
NEUTROPHIL AUTOMATED: 58.6 %
PLATELET COUNT: 228 K/MM3 (ref 150–450)
RED BLOOD CELL COUNT: 3.8 M/MM3 (ref 4.34–5.6)
RED CELL DISTRIBUTIO: 13.2 % (ref 11.9–15.9)
WHITE BLOOD CELL COU: 10.1 K/MM3 (ref 4–11)

## 2023-04-01 PROCEDURE — 99233 SBSQ HOSP IP/OBS HIGH 50: CPT | Performed by: INTERNAL MEDICINE

## 2023-04-02 LAB
BEDSIDE GLUCOSE: 119 MG/DL (ref 70–110)
BEDSIDE GLUCOSE: 125 MG/DL (ref 70–110)
BEDSIDE GLUCOSE: 145 MG/DL (ref 70–110)
BEDSIDE GLUCOSE: 163 MG/DL (ref 70–110)

## 2023-04-03 LAB
*MEAN CORPUSCULAR HGB CONC: 35 G/DL (ref 32.5–35.8)
ANION GAP: 8 MEQ/L (ref 6.2–14.7)
BASOPHIL AUTOMATED: 1.3 %
BASOPHILS: 0.1 (ref 0–0.14)
BEDSIDE GLUCOSE: 105 MG/DL (ref 70–110)
BEDSIDE GLUCOSE: 150 MG/DL (ref 70–110)
BEDSIDE GLUCOSE: 168 MG/DL (ref 70–110)
BLOOD UREA NITROGEN (BUN): 29 MG/DL (ref 7–25)
BUN/CREATININE RATIO: 22.8 (ref 7.4–23)
CALCIUM, SERUM: 9.4 MG/DL (ref 8.6–10.3)
CARBON DIOXIDE: 28 MEQ/L (ref 21–31)
CHLORIDE, SERUM: 105 MMOL/L (ref 98–107)
CHRONIC KIDNEY DISEASE STAGE: ABNORMAL
CREATININE: 1.27 MG/DL (ref 0.7–1.3)
EOSINOPHIL AUTOMATED: 9.6 %
EOSINOPHILS: 0.9 (ref 0–0.6)
EST GLOMERULAR FILTRATION RATE: 58 ML/MIN
GLUCOSE: 113 MG/DL (ref 70–99)
HEMATOCRIT: 39 % (ref 38.6–49.2)
HEMOGLOBIN: 13.6 GM/DL (ref 13–17)
K (POTASSIUM, SERUM): 3.4 MMOL/L (ref 3.5–5.2)
LYMPHOCYTE AUTOMATED: 17.6 %
LYMPHOCYTES: 1.7 (ref 0.78–3.73)
MEAN CORPUSCULAR HGB: 34.9 PG (ref 26–34)
MEAN CORPUSCULAR VOL: 99.9 FL (ref 80–100)
MEAN PLATELET VOLUME: 9.3 FL (ref 6.8–10.2)
MONOCYTE AUTOMATED: 14.4 %
MONOCYTES: 1.4 (ref 0.17–1)
NA (SODIUM, SERUM): 141 MMOL/L (ref 133–144)
NEUTROPHIL ABSOLUTE: 5.4 (ref 1.91–7.6)
NEUTROPHIL AUTOMATED: 57.1 %
PLATELET COUNT: 242 K/MM3 (ref 150–450)
RED BLOOD CELL COUNT: 3.9 M/MM3 (ref 4.34–5.6)
RED CELL DISTRIBUTIO: 13.4 % (ref 11.9–15.9)
WHITE BLOOD CELL COU: 9.4 K/MM3 (ref 4–11)

## 2023-04-04 ENCOUNTER — EXTERNAL RECORD (OUTPATIENT)
Dept: OTHER | Age: 78
End: 2023-04-04

## 2023-04-04 LAB
*MEAN CORPUSCULAR HGB CONC: 33.8 G/DL (ref 32.5–35.8)
ALCOHOL, ETOH: < 10 MG/DL
ANION GAP: 11 MEQ/L (ref 6.2–14.7)
BASOPHIL AUTOMATED: 1.2 %
BASOPHILS: 0.1 (ref 0–0.14)
BEDSIDE GLUCOSE: 151 MG/DL (ref 70–110)
BEDSIDE GLUCOSE: 173 MG/DL (ref 70–110)
BEDSIDE GLUCOSE: 193 MG/DL (ref 70–110)
BLOOD UREA NITROGEN (BUN): 39 MG/DL (ref 7–25)
BUN/CREATININE RATIO: 24.2 (ref 7.4–23)
CALCIUM, SERUM: 9.8 MG/DL (ref 8.6–10.3)
CARBON DIOXIDE: 25 MEQ/L (ref 21–31)
CHLORIDE, SERUM: 99 MMOL/L (ref 98–107)
CHRONIC KIDNEY DISEASE STAGE: ABNORMAL
CREATININE: 1.61 MG/DL (ref 0.7–1.3)
EOSINOPHIL AUTOMATED: 8.4 %
EOSINOPHILS: 0.8 (ref 0–0.6)
EST GLOMERULAR FILTRATION RATE: 44 ML/MIN
GLUCOSE: 189 MG/DL (ref 70–99)
HEMATOCRIT: 43.6 % (ref 38.6–49.2)
HEMOGLOBIN: 14.7 GM/DL (ref 13–17)
K (POTASSIUM, SERUM): 3.7 MMOL/L (ref 3.5–5.2)
LYMPHOCYTE AUTOMATED: 19 %
LYMPHOCYTES: 1.9 (ref 0.78–3.73)
MEAN CORPUSCULAR HGB: 34.1 PG (ref 26–34)
MEAN CORPUSCULAR VOL: 101 FL (ref 80–100)
MEAN PLATELET VOLUME: 8.8 FL (ref 6.8–10.2)
MONOCYTE AUTOMATED: 13.1 %
MONOCYTES: 1.3 (ref 0.17–1)
NA (SODIUM, SERUM): 135 MMOL/L (ref 133–144)
NEUTROPHIL ABSOLUTE: 5.9 (ref 1.91–7.6)
NEUTROPHIL AUTOMATED: 58.3 %
PLATELET COUNT: 286 K/MM3 (ref 150–450)
RED BLOOD CELL COUNT: 4.31 M/MM3 (ref 4.34–5.6)
RED CELL DISTRIBUTIO: 13.4 % (ref 11.9–15.9)
WHITE BLOOD CELL COU: 10.1 K/MM3 (ref 4–11)

## 2023-05-02 ENCOUNTER — HOSPITAL ENCOUNTER (OUTPATIENT)
Dept: CARDIOLOGY CLINIC | Facility: HOSPITAL | Age: 78
Discharge: HOME | End: 2023-05-02
Attending: INTERNAL MEDICINE
Payer: MEDICARE

## 2023-05-02 ENCOUNTER — HOSPITAL ENCOUNTER (OUTPATIENT)
Dept: CT IMAGING | Facility: HOSPITAL | Age: 78
Discharge: HOME | End: 2023-05-02
Attending: INTERNAL MEDICINE
Payer: MEDICARE

## 2023-05-02 ENCOUNTER — HOSPITAL ENCOUNTER (OUTPATIENT)
Dept: LAB | Facility: HOSPITAL | Age: 78
Discharge: HOME | End: 2023-05-02
Attending: INTERNAL MEDICINE
Payer: MEDICARE

## 2023-05-02 ENCOUNTER — HOSPITAL ENCOUNTER (OUTPATIENT)
Dept: ULTRASOUND IMAGING | Facility: HOSPITAL | Age: 78
Discharge: HOME | End: 2023-05-02
Attending: INTERNAL MEDICINE
Payer: MEDICARE

## 2023-05-02 ENCOUNTER — HOSPITAL ENCOUNTER (OUTPATIENT)
Dept: GENERAL RADIOLOGY | Facility: HOSPITAL | Age: 78
Discharge: HOME | End: 2023-05-02
Attending: INTERNAL MEDICINE
Payer: MEDICARE

## 2023-05-02 ENCOUNTER — HOSPITAL ENCOUNTER (OUTPATIENT)
Dept: CV DIAGNOSTICS | Facility: HOSPITAL | Age: 78
Discharge: HOME | End: 2023-05-02
Attending: INTERNAL MEDICINE
Payer: MEDICARE

## 2023-05-02 VITALS — HEART RATE: 67 BPM | SYSTOLIC BLOOD PRESSURE: 148 MMHG | RESPIRATION RATE: 15 BRPM | DIASTOLIC BLOOD PRESSURE: 68 MMHG

## 2023-05-02 DIAGNOSIS — I35.0 AORTIC STENOSIS: ICD-10-CM

## 2023-05-02 DIAGNOSIS — I25.10 CAD (CORONARY ARTERY DISEASE): ICD-10-CM

## 2023-05-02 LAB
ANION GAP SERPL CALC-SCNC: 1 MMOL/L (ref 0–18)
ANTIBODY SCREEN: NEGATIVE
ATRIAL RATE: 71 BPM
BUN BLD-MCNC: 50 MG/DL (ref 7–18)
CALCIUM BLD-MCNC: 9.9 MG/DL (ref 8.5–10.1)
CHLORIDE SERPL-SCNC: 104 MMOL/L (ref 98–112)
CO2 SERPL-SCNC: 31 MMOL/L (ref 21–32)
CREAT BLD-MCNC: 1.8 MG/DL
ERYTHROCYTE [DISTWIDTH] IN BLOOD BY AUTOMATED COUNT: 12.7 %
FASTING STATUS PATIENT QL REPORTED: NO
GFR SERPLBLD BASED ON 1.73 SQ M-ARVRAT: 38 ML/MIN/1.73M2 (ref 60–?)
GLUCOSE BLD-MCNC: 130 MG/DL (ref 70–99)
HCT VFR BLD AUTO: 38.9 %
HGB BLD-MCNC: 13.2 G/DL
MCH RBC QN AUTO: 33.8 PG (ref 26–34)
MCHC RBC AUTO-ENTMCNC: 33.9 G/DL (ref 31–37)
MCV RBC AUTO: 99.5 FL
OSMOLALITY SERPL CALC.SUM OF ELEC: 297 MOSM/KG (ref 275–295)
P AXIS: 55 DEGREES
P-R INTERVAL: 332 MS
PLATELET # BLD AUTO: 247 10(3)UL (ref 150–450)
POTASSIUM SERPL-SCNC: 4.1 MMOL/L (ref 3.5–5.1)
Q-T INTERVAL: 418 MS
QRS DURATION: 96 MS
QTC CALCULATION (BEZET): 454 MS
R AXIS: 1 DEGREES
RBC # BLD AUTO: 3.91 X10(6)UL
RH BLOOD TYPE: POSITIVE
SODIUM SERPL-SCNC: 136 MMOL/L (ref 136–145)
T AXIS: 101 DEGREES
VENTRICULAR RATE: 71 BPM
WBC # BLD AUTO: 9.1 X10(3) UL (ref 4–11)

## 2023-05-02 PROCEDURE — 36415 COLL VENOUS BLD VENIPUNCTURE: CPT | Performed by: INTERNAL MEDICINE

## 2023-05-02 PROCEDURE — 85027 COMPLETE CBC AUTOMATED: CPT | Performed by: INTERNAL MEDICINE

## 2023-05-02 PROCEDURE — 80048 BASIC METABOLIC PNL TOTAL CA: CPT | Performed by: INTERNAL MEDICINE

## 2023-05-02 PROCEDURE — 86900 BLOOD TYPING SEROLOGIC ABO: CPT | Performed by: INTERNAL MEDICINE

## 2023-05-02 PROCEDURE — 86850 RBC ANTIBODY SCREEN: CPT | Performed by: INTERNAL MEDICINE

## 2023-05-02 PROCEDURE — 99212 OFFICE O/P EST SF 10 MIN: CPT

## 2023-05-02 PROCEDURE — 93880 EXTRACRANIAL BILAT STUDY: CPT | Performed by: INTERNAL MEDICINE

## 2023-05-02 PROCEDURE — 86901 BLOOD TYPING SEROLOGIC RH(D): CPT | Performed by: INTERNAL MEDICINE

## 2023-05-02 PROCEDURE — 93005 ELECTROCARDIOGRAM TRACING: CPT

## 2023-05-02 PROCEDURE — 71275 CT ANGIOGRAPHY CHEST: CPT | Performed by: INTERNAL MEDICINE

## 2023-05-02 PROCEDURE — 71046 X-RAY EXAM CHEST 2 VIEWS: CPT | Performed by: INTERNAL MEDICINE

## 2023-05-02 PROCEDURE — 74174 CTA ABD&PLVS W/CONTRAST: CPT | Performed by: INTERNAL MEDICINE

## 2023-05-02 PROCEDURE — 93010 ELECTROCARDIOGRAM REPORT: CPT | Performed by: INTERNAL MEDICINE

## 2023-05-02 NOTE — IMAGING NOTE
Tomás Jack , verified name,  and allergies. Procedure explained and questions answered. Contrast injected followed by saline flush at 1200  Contrast = 85 ml  0.9 NS flush = 75 ml  Average HR = 60    Patient tolerated the procedure without complication. Denies any contrast reaction. Discontinued IV saline lock.

## 2023-05-18 ENCOUNTER — HOSPITAL ENCOUNTER (OUTPATIENT)
Dept: CARDIOLOGY CLINIC | Facility: HOSPITAL | Age: 78
Discharge: HOME | End: 2023-05-18
Attending: INTERNAL MEDICINE
Payer: MEDICARE

## 2023-05-18 DIAGNOSIS — I35.0 AORTIC STENOSIS: Primary | ICD-10-CM

## 2023-05-18 PROCEDURE — 99212 OFFICE O/P EST SF 10 MIN: CPT

## 2023-05-18 NOTE — CONSULTS
[facility]    PATIENT'S NAME: Rebecca Crawford   ATTENDING PHYSICIAN: Parag Alberto M.D.   CONSULTING PHYSICIAN: Parag Alberto M.D. PATIENT ACCOUNT#:   [de-identified]    LOCATION:  [facility]   [facility]  MEDICAL RECORD #:   [id number]       YOB: 1945  ADMISSION DATE:       05/18/2023      CONSULT DATE:  05/18/2023    REPORT OF CONSULTATION    HISTORY OF PRESENT ILLNESS:  This is the first office visit for the patient with me. I am seeing him today in the [place] [address]. for consideration of percutaneous transcatheter aortic valve replacement. Patient has actually been seen by numerous cardiologists over time and has apparently asked the structural heart service that he follow with the [address] [place] [address] group. He has known coronary disease and underwent complex multivessel coronary intervention with Dr. Myron Herrera in March. He also has severe aortic stenosis with a mean gradient of 36 mmHg. He has associated moderate to severe mitral insufficiency. In general, his main complaint is fatigue. He also does not like having to take his medications. He has had no anginal-like chest discomfort. 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 He and his wife have been  for 55 years. PHYSICAL EXAMINATION:  On examination today, he is alert and conversive. Lungs are clear. He has an aortic stenotic murmur. He has an underlying regular rhythm. No significant edema. He has multiple associated medical issues. Recent BUN 50, creatinine 1.8. Hemoglobin 13.2. In reviewing his echocardiogram, he seems to have relatively well-preserved left ventricular systolic function. It was described as moderate dysfunction in one of his reports.   I think it is only mild at best.    His STS score is 2.9%, yet certainly with his age and comorbidities, a percutaneous approach would be preferred over surgery. He has been seen by Dr. Bia Doran, who also felt that a percutaneous approach would be most appropriate. ASSESSMENT AND PLAN:  We reviewed the procedure again today. His wife asked some very appropriate questions. I think they have a very nice understanding. His imaging has been reviewed by the structural heart team and he is tentatively scheduled for the procedure next week. I again reiterated the potential for a pacemaker. He had a urinary tract infection in April and was hospitalized with mental status changes. He has had subsequent urology followup. He has underlying prostatic hypertrophy. He should not have a Hernandez catheter placed during this procedure. I have explained to him that we will do our best to decrease his medical regimen if possible over time. His original contact with our practice was with Dr. Cuong Martin. He will be following up with her in the office along with the structural heart team.    I think everyone has a good understanding of the plan and will move forward from here.      Dictated By Kelli Ayala M.D.  d: 05/18/2023 10:27:07  t: 05/18/2023 11:04:11  [facility] [id number]  /

## 2023-05-22 ENCOUNTER — LAB ENCOUNTER (OUTPATIENT)
Dept: LAB | Facility: HOSPITAL | Age: 78
End: 2023-05-22
Attending: INTERNAL MEDICINE
Payer: MEDICARE

## 2023-05-22 ENCOUNTER — LAB ENCOUNTER (OUTPATIENT)
Dept: LAB | Facility: HOSPITAL | Age: 78
DRG: 267 | End: 2023-05-22
Attending: INTERNAL MEDICINE
Payer: MEDICARE

## 2023-05-22 DIAGNOSIS — I35.0 AORTIC STENOSIS: ICD-10-CM

## 2023-05-22 LAB — SARS-COV-2 RNA RESP QL NAA+PROBE: NOT DETECTED

## 2023-05-22 PROCEDURE — 87635 SARS-COV-2 COVID-19 AMP PRB: CPT

## 2023-05-25 ENCOUNTER — APPOINTMENT (OUTPATIENT)
Dept: CV DIAGNOSTICS | Facility: HOSPITAL | Age: 78
DRG: 267 | End: 2023-05-25
Attending: INTERNAL MEDICINE
Payer: MEDICARE

## 2023-05-25 ENCOUNTER — APPOINTMENT (OUTPATIENT)
Dept: GENERAL RADIOLOGY | Facility: HOSPITAL | Age: 78
DRG: 267 | End: 2023-05-25
Attending: NURSE PRACTITIONER
Payer: MEDICARE

## 2023-05-25 ENCOUNTER — HOSPITAL ENCOUNTER (INPATIENT)
Facility: HOSPITAL | Age: 78
LOS: 1 days | Discharge: HOME | DRG: 267 | End: 2023-05-26
Attending: INTERNAL MEDICINE | Admitting: INTERNAL MEDICINE
Payer: MEDICARE

## 2023-05-25 ENCOUNTER — ANESTHESIA (OUTPATIENT)
Dept: CARDIAC SURGERY | Facility: HOSPITAL | Age: 78
End: 2023-05-25
Payer: MEDICARE

## 2023-05-25 ENCOUNTER — HOSPITAL ENCOUNTER (INPATIENT)
Dept: CV DIAGNOSTICS | Facility: HOSPITAL | Age: 78
Discharge: HOME | DRG: 267 | End: 2023-05-25
Attending: INTERNAL MEDICINE
Payer: MEDICARE

## 2023-05-25 ENCOUNTER — HOSPITAL ENCOUNTER (INPATIENT)
Dept: CV DIAGNOSTICS | Facility: HOSPITAL | Age: 78
Discharge: HOME | End: 2023-05-25
Attending: INTERNAL MEDICINE
Payer: MEDICARE

## 2023-05-25 ENCOUNTER — HOSPITAL ENCOUNTER (INPATIENT)
Facility: HOSPITAL | Age: 78
LOS: 1 days | Discharge: HOME | End: 2023-05-26
Attending: INTERNAL MEDICINE | Admitting: INTERNAL MEDICINE
Payer: MEDICARE

## 2023-05-25 ENCOUNTER — APPOINTMENT (OUTPATIENT)
Dept: CV DIAGNOSTICS | Facility: HOSPITAL | Age: 78
End: 2023-05-25
Attending: INTERNAL MEDICINE
Payer: MEDICARE

## 2023-05-25 ENCOUNTER — APPOINTMENT (OUTPATIENT)
Dept: GENERAL RADIOLOGY | Facility: HOSPITAL | Age: 78
End: 2023-05-25
Attending: NURSE PRACTITIONER
Payer: MEDICARE

## 2023-05-25 ENCOUNTER — ANESTHESIA EVENT (OUTPATIENT)
Dept: CARDIAC SURGERY | Facility: HOSPITAL | Age: 78
End: 2023-05-25
Payer: MEDICARE

## 2023-05-25 PROBLEM — I50.22 CHRONIC SYSTOLIC HEART FAILURE (HCC): Status: ACTIVE | Noted: 2023-05-25

## 2023-05-25 PROBLEM — I35.0 AORTIC STENOSIS: Status: ACTIVE | Noted: 2023-05-25

## 2023-05-25 LAB
ALBUMIN SERPL-MCNC: 2.7 G/DL (ref 3.4–5)
ALBUMIN/GLOB SERPL: 0.7 {RATIO} (ref 1–2)
ALP LIVER SERPL-CCNC: 61 U/L
ALT SERPL-CCNC: 17 U/L
ANION GAP SERPL CALC-SCNC: 3 MMOL/L (ref 0–18)
ANION GAP SERPL CALC-SCNC: 4 MMOL/L (ref 0–18)
APTT PPP: 36 SECONDS (ref 23.3–35.6)
AST SERPL-CCNC: 21 U/L (ref 15–37)
BASE EXCESS BLD CALC-SCNC: 1 MMOL/L
BILIRUB SERPL-MCNC: 0.5 MG/DL (ref 0.1–2)
BUN BLD-MCNC: 46 MG/DL (ref 7–18)
BUN BLD-MCNC: 47 MG/DL (ref 7–18)
CA-I BLD-SCNC: 1.2 MMOL/L (ref 1.12–1.32)
CALCIUM BLD-MCNC: 8.8 MG/DL (ref 8.5–10.1)
CALCIUM BLD-MCNC: 9.9 MG/DL (ref 8.5–10.1)
CHLORIDE SERPL-SCNC: 106 MMOL/L (ref 98–112)
CHLORIDE SERPL-SCNC: 110 MMOL/L (ref 98–112)
CO2 BLD-SCNC: 26 MMOL/L (ref 22–32)
CO2 SERPL-SCNC: 26 MMOL/L (ref 21–32)
CO2 SERPL-SCNC: 29 MMOL/L (ref 21–32)
CREAT BLD-MCNC: 1.7 MG/DL
CREAT BLD-MCNC: 1.83 MG/DL
ERYTHROCYTE [DISTWIDTH] IN BLOOD BY AUTOMATED COUNT: 12.8 %
GFR SERPLBLD BASED ON 1.73 SQ M-ARVRAT: 37 ML/MIN/1.73M2 (ref 60–?)
GFR SERPLBLD BASED ON 1.73 SQ M-ARVRAT: 41 ML/MIN/1.73M2 (ref 60–?)
GLOBULIN PLAS-MCNC: 4 G/DL (ref 2.8–4.4)
GLUCOSE BLD-MCNC: 104 MG/DL (ref 70–99)
GLUCOSE BLD-MCNC: 127 MG/DL (ref 70–99)
GLUCOSE BLD-MCNC: 95 MG/DL (ref 70–99)
GLUCOSE BLD-MCNC: 97 MG/DL (ref 70–99)
GLUCOSE BLD-MCNC: 98 MG/DL (ref 70–99)
HCO3 BLD-SCNC: 24.5 MEQ/L
HCT VFR BLD AUTO: 30.4 %
HCT VFR BLD CALC: 28 %
HGB BLD-MCNC: 10.5 G/DL
INR BLD: 1.27 (ref 0.85–1.16)
ISTAT ACTIVATED CLOTTING TIME: 618 SECONDS (ref 74–137)
MAGNESIUM SERPL-MCNC: 2.2 MG/DL (ref 1.6–2.6)
MCH RBC QN AUTO: 33.3 PG (ref 26–34)
MCHC RBC AUTO-ENTMCNC: 34.5 G/DL (ref 31–37)
MCV RBC AUTO: 96.5 FL
NT-PROBNP SERPL-MCNC: 5376 PG/ML (ref ?–450)
OSMOLALITY SERPL CALC.SUM OF ELEC: 299 MOSM/KG (ref 275–295)
OSMOLALITY SERPL CALC.SUM OF ELEC: 302 MOSM/KG (ref 275–295)
PCO2 BLD: 34.2 MMHG
PH BLD: 7.46 [PH]
PLATELET # BLD AUTO: 177 10(3)UL (ref 150–450)
PO2 BLD: 381 MMHG
POTASSIUM BLD-SCNC: 3.8 MMOL/L (ref 3.6–5.1)
POTASSIUM SERPL-SCNC: 3.7 MMOL/L (ref 3.5–5.1)
POTASSIUM SERPL-SCNC: 4.1 MMOL/L (ref 3.5–5.1)
PROT SERPL-MCNC: 6.7 G/DL (ref 6.4–8.2)
PROTHROMBIN TIME: 15.9 SECONDS (ref 11.6–14.8)
RBC # BLD AUTO: 3.15 X10(6)UL
SAO2 % BLD: 100 %
SODIUM BLD-SCNC: 140 MMOL/L (ref 136–145)
SODIUM SERPL-SCNC: 138 MMOL/L (ref 136–145)
SODIUM SERPL-SCNC: 140 MMOL/L (ref 136–145)
WBC # BLD AUTO: 7.3 X10(3) UL (ref 4–11)

## 2023-05-25 PROCEDURE — 84132 ASSAY OF SERUM POTASSIUM: CPT

## 2023-05-25 PROCEDURE — 76942 ECHO GUIDE FOR BIOPSY: CPT | Performed by: ANESTHESIOLOGY

## 2023-05-25 PROCEDURE — 93355 ECHO TRANSESOPHAGEAL (TEE): CPT | Performed by: INTERNAL MEDICINE

## 2023-05-25 PROCEDURE — 85730 THROMBOPLASTIN TIME PARTIAL: CPT | Performed by: NURSE PRACTITIONER

## 2023-05-25 PROCEDURE — B24BZZ4 ULTRASONOGRAPHY OF HEART WITH AORTA, TRANSESOPHAGEAL: ICD-10-PCS | Performed by: INTERNAL MEDICINE

## 2023-05-25 PROCEDURE — B310YZZ FLUOROSCOPY OF THORACIC AORTA USING OTHER CONTRAST: ICD-10-PCS | Performed by: INTERNAL MEDICINE

## 2023-05-25 PROCEDURE — 85014 HEMATOCRIT: CPT

## 2023-05-25 PROCEDURE — 93312 ECHO TRANSESOPHAGEAL: CPT | Performed by: ANESTHESIOLOGY

## 2023-05-25 PROCEDURE — 85027 COMPLETE CBC AUTOMATED: CPT | Performed by: NURSE PRACTITIONER

## 2023-05-25 PROCEDURE — 86920 COMPATIBILITY TEST SPIN: CPT

## 2023-05-25 PROCEDURE — 80048 BASIC METABOLIC PNL TOTAL CA: CPT | Performed by: INTERNAL MEDICINE

## 2023-05-25 PROCEDURE — 71045 X-RAY EXAM CHEST 1 VIEW: CPT | Performed by: NURSE PRACTITIONER

## 2023-05-25 PROCEDURE — 85610 PROTHROMBIN TIME: CPT | Performed by: NURSE PRACTITIONER

## 2023-05-25 PROCEDURE — 02RF38Z REPLACEMENT OF AORTIC VALVE WITH ZOOPLASTIC TISSUE, PERCUTANEOUS APPROACH: ICD-10-PCS | Performed by: INTERNAL MEDICINE

## 2023-05-25 PROCEDURE — 83880 ASSAY OF NATRIURETIC PEPTIDE: CPT | Performed by: INTERNAL MEDICINE

## 2023-05-25 PROCEDURE — 84295 ASSAY OF SERUM SODIUM: CPT

## 2023-05-25 PROCEDURE — 83735 ASSAY OF MAGNESIUM: CPT | Performed by: NURSE PRACTITIONER

## 2023-05-25 PROCEDURE — 80053 COMPREHEN METABOLIC PANEL: CPT | Performed by: INTERNAL MEDICINE

## 2023-05-25 PROCEDURE — 82330 ASSAY OF CALCIUM: CPT

## 2023-05-25 PROCEDURE — 85347 COAGULATION TIME ACTIVATED: CPT

## 2023-05-25 PROCEDURE — 82803 BLOOD GASES ANY COMBINATION: CPT

## 2023-05-25 PROCEDURE — 82962 GLUCOSE BLOOD TEST: CPT

## 2023-05-25 DEVICE — SAPIEN 3 ULTRA RESILIA TRANSCATHETER HEART VALVE, 29MM
Type: IMPLANTABLE DEVICE | Status: FUNCTIONAL
Brand: SAPIEN 3 ULTRA RESILIA

## 2023-05-25 RX ORDER — INSULIN ASPART 100 [IU]/ML
INJECTION, SOLUTION INTRAVENOUS; SUBCUTANEOUS ONCE
Status: DISCONTINUED | OUTPATIENT
Start: 2023-05-25 | End: 2023-05-26

## 2023-05-25 RX ORDER — POTASSIUM CHLORIDE 20 MEQ/1
40 TABLET, EXTENDED RELEASE ORAL ONCE
Status: COMPLETED | OUTPATIENT
Start: 2023-05-25 | End: 2023-05-25

## 2023-05-25 RX ORDER — BISACODYL 10 MG
10 SUPPOSITORY, RECTAL RECTAL
Status: DISCONTINUED | OUTPATIENT
Start: 2023-05-25 | End: 2023-05-26

## 2023-05-25 RX ORDER — CARVEDILOL 3.12 MG/1
3.12 TABLET ORAL 2 TIMES DAILY WITH MEALS
Status: DISCONTINUED | OUTPATIENT
Start: 2023-05-25 | End: 2023-05-26

## 2023-05-25 RX ORDER — NICOTINE POLACRILEX 4 MG
30 LOZENGE BUCCAL
Status: DISCONTINUED | OUTPATIENT
Start: 2023-05-25 | End: 2023-05-26

## 2023-05-25 RX ORDER — PROTAMINE SULFATE 10 MG/ML
INJECTION, SOLUTION INTRAVENOUS AS NEEDED
Status: DISCONTINUED | OUTPATIENT
Start: 2023-05-25 | End: 2023-05-25 | Stop reason: SURG

## 2023-05-25 RX ORDER — DEXTROSE MONOHYDRATE 25 G/50ML
50 INJECTION, SOLUTION INTRAVENOUS
Status: DISCONTINUED | OUTPATIENT
Start: 2023-05-25 | End: 2023-05-26

## 2023-05-25 RX ORDER — ENEMA 19; 7 G/133ML; G/133ML
1 ENEMA RECTAL ONCE AS NEEDED
Status: DISCONTINUED | OUTPATIENT
Start: 2023-05-25 | End: 2023-05-26

## 2023-05-25 RX ORDER — FUROSEMIDE 10 MG/ML
40 INJECTION INTRAMUSCULAR; INTRAVENOUS ONCE
Status: COMPLETED | OUTPATIENT
Start: 2023-05-25 | End: 2023-05-25

## 2023-05-25 RX ORDER — NICOTINE POLACRILEX 4 MG
15 LOZENGE BUCCAL
Status: DISCONTINUED | OUTPATIENT
Start: 2023-05-25 | End: 2023-05-26

## 2023-05-25 RX ORDER — SODIUM CHLORIDE 9 MG/ML
INJECTION, SOLUTION INTRAVENOUS CONTINUOUS PRN
Status: DISCONTINUED | OUTPATIENT
Start: 2023-05-25 | End: 2023-05-25 | Stop reason: SURG

## 2023-05-25 RX ORDER — ACETAMINOPHEN 325 MG/1
650 TABLET ORAL EVERY 6 HOURS PRN
Status: DISCONTINUED | OUTPATIENT
Start: 2023-05-25 | End: 2023-05-26

## 2023-05-25 RX ORDER — SODIUM CHLORIDE 9 MG/ML
INJECTION, SOLUTION INTRAVENOUS CONTINUOUS
Status: DISCONTINUED | OUTPATIENT
Start: 2023-05-25 | End: 2023-05-25

## 2023-05-25 RX ORDER — ATORVASTATIN CALCIUM 40 MG/1
40 TABLET, FILM COATED ORAL NIGHTLY
Status: DISCONTINUED | OUTPATIENT
Start: 2023-05-25 | End: 2023-05-26

## 2023-05-25 RX ORDER — ONDANSETRON 2 MG/ML
4 INJECTION INTRAMUSCULAR; INTRAVENOUS EVERY 6 HOURS PRN
Status: DISCONTINUED | OUTPATIENT
Start: 2023-05-25 | End: 2023-05-26

## 2023-05-25 RX ORDER — FAMOTIDINE 10 MG/ML
20 INJECTION, SOLUTION INTRAVENOUS NIGHTLY
Status: DISCONTINUED | OUTPATIENT
Start: 2023-05-25 | End: 2023-05-26

## 2023-05-25 RX ORDER — ACETAMINOPHEN 325 MG/1
325 TABLET ORAL EVERY 6 HOURS PRN
Status: DISCONTINUED | OUTPATIENT
Start: 2023-05-25 | End: 2023-05-26

## 2023-05-25 RX ORDER — ROCURONIUM BROMIDE 10 MG/ML
INJECTION, SOLUTION INTRAVENOUS AS NEEDED
Status: DISCONTINUED | OUTPATIENT
Start: 2023-05-25 | End: 2023-05-25 | Stop reason: SURG

## 2023-05-25 RX ORDER — HYDRALAZINE HYDROCHLORIDE 20 MG/ML
10 INJECTION INTRAMUSCULAR; INTRAVENOUS EVERY 2 HOUR PRN
Status: DISCONTINUED | OUTPATIENT
Start: 2023-05-25 | End: 2023-05-26

## 2023-05-25 RX ORDER — ONDANSETRON 2 MG/ML
INJECTION INTRAMUSCULAR; INTRAVENOUS AS NEEDED
Status: DISCONTINUED | OUTPATIENT
Start: 2023-05-25 | End: 2023-05-25 | Stop reason: SURG

## 2023-05-25 RX ORDER — HYDROMORPHONE HYDROCHLORIDE 1 MG/ML
0.2 INJECTION, SOLUTION INTRAMUSCULAR; INTRAVENOUS; SUBCUTANEOUS EVERY 5 MIN PRN
Status: ACTIVE | OUTPATIENT
Start: 2023-05-25 | End: 2023-05-25

## 2023-05-25 RX ORDER — ISOSORBIDE DINITRATE 5 MG/1
5 TABLET ORAL
Status: DISCONTINUED | OUTPATIENT
Start: 2023-05-25 | End: 2023-05-26

## 2023-05-25 RX ORDER — FAMOTIDINE 20 MG/1
20 TABLET, FILM COATED ORAL NIGHTLY
Status: DISCONTINUED | OUTPATIENT
Start: 2023-05-25 | End: 2023-05-26

## 2023-05-25 RX ORDER — CEFAZOLIN SODIUM/WATER 2 G/20 ML
2 SYRINGE (ML) INTRAVENOUS EVERY 8 HOURS
Status: COMPLETED | OUTPATIENT
Start: 2023-05-25 | End: 2023-05-26

## 2023-05-25 RX ORDER — LIDOCAINE HYDROCHLORIDE 10 MG/ML
INJECTION, SOLUTION EPIDURAL; INFILTRATION; INTRACAUDAL; PERINEURAL AS NEEDED
Status: DISCONTINUED | OUTPATIENT
Start: 2023-05-25 | End: 2023-05-25 | Stop reason: SURG

## 2023-05-25 RX ORDER — METOCLOPRAMIDE HYDROCHLORIDE 5 MG/ML
5 INJECTION INTRAMUSCULAR; INTRAVENOUS EVERY 8 HOURS PRN
Status: DISCONTINUED | OUTPATIENT
Start: 2023-05-25 | End: 2023-05-26

## 2023-05-25 RX ORDER — HEPARIN SODIUM 1000 [USP'U]/ML
INJECTION, SOLUTION INTRAVENOUS; SUBCUTANEOUS AS NEEDED
Status: DISCONTINUED | OUTPATIENT
Start: 2023-05-25 | End: 2023-05-25 | Stop reason: SURG

## 2023-05-25 RX ORDER — LIDOCAINE HYDROCHLORIDE 20 MG/ML
10 JELLY TOPICAL AS NEEDED
Status: DISCONTINUED | OUTPATIENT
Start: 2023-05-25 | End: 2023-05-26

## 2023-05-25 RX ORDER — FUROSEMIDE 20 MG/1
20 TABLET ORAL DAILY
Status: DISCONTINUED | OUTPATIENT
Start: 2023-05-26 | End: 2023-05-26

## 2023-05-25 RX ORDER — HYDROMORPHONE HYDROCHLORIDE 1 MG/ML
0.4 INJECTION, SOLUTION INTRAMUSCULAR; INTRAVENOUS; SUBCUTANEOUS EVERY 5 MIN PRN
Status: ACTIVE | OUTPATIENT
Start: 2023-05-25 | End: 2023-05-25

## 2023-05-25 RX ORDER — SODIUM CHLORIDE 9 MG/ML
INJECTION, SOLUTION INTRAVENOUS CONTINUOUS
Status: ACTIVE | OUTPATIENT
Start: 2023-05-25 | End: 2023-05-26

## 2023-05-25 RX ORDER — ESMOLOL HYDROCHLORIDE 10 MG/ML
INJECTION INTRAVENOUS AS NEEDED
Status: DISCONTINUED | OUTPATIENT
Start: 2023-05-25 | End: 2023-05-25 | Stop reason: SURG

## 2023-05-25 RX ORDER — HYDROCODONE BITARTRATE AND ACETAMINOPHEN 5; 325 MG/1; MG/1
2 TABLET ORAL EVERY 6 HOURS PRN
Status: DISCONTINUED | OUTPATIENT
Start: 2023-05-25 | End: 2023-05-26

## 2023-05-25 RX ORDER — POLYETHYLENE GLYCOL 3350 17 G/17G
17 POWDER, FOR SOLUTION ORAL DAILY PRN
Status: DISCONTINUED | OUTPATIENT
Start: 2023-05-25 | End: 2023-05-26

## 2023-05-25 RX ORDER — HYDROMORPHONE HYDROCHLORIDE 1 MG/ML
0.6 INJECTION, SOLUTION INTRAMUSCULAR; INTRAVENOUS; SUBCUTANEOUS EVERY 5 MIN PRN
Status: ACTIVE | OUTPATIENT
Start: 2023-05-25 | End: 2023-05-25

## 2023-05-25 RX ORDER — METOCLOPRAMIDE HYDROCHLORIDE 5 MG/ML
10 INJECTION INTRAMUSCULAR; INTRAVENOUS EVERY 8 HOURS PRN
Status: DISCONTINUED | OUTPATIENT
Start: 2023-05-25 | End: 2023-05-26

## 2023-05-25 RX ORDER — HYDROCODONE BITARTRATE AND ACETAMINOPHEN 5; 325 MG/1; MG/1
1 TABLET ORAL EVERY 6 HOURS PRN
Status: DISCONTINUED | OUTPATIENT
Start: 2023-05-25 | End: 2023-05-26

## 2023-05-25 RX ORDER — GLYCOPYRROLATE 0.2 MG/ML
INJECTION, SOLUTION INTRAMUSCULAR; INTRAVENOUS AS NEEDED
Status: DISCONTINUED | OUTPATIENT
Start: 2023-05-25 | End: 2023-05-25 | Stop reason: SURG

## 2023-05-25 RX ORDER — NITROGLYCERIN 20 MG/100ML
INJECTION INTRAVENOUS CONTINUOUS PRN
Status: DISCONTINUED | OUTPATIENT
Start: 2023-05-25 | End: 2023-05-26

## 2023-05-25 RX ORDER — CEFAZOLIN SODIUM/WATER 2 G/20 ML
SYRINGE (ML) INTRAVENOUS AS NEEDED
Status: DISCONTINUED | OUTPATIENT
Start: 2023-05-25 | End: 2023-05-25 | Stop reason: SURG

## 2023-05-25 RX ORDER — SENNOSIDES 8.6 MG
17.2 TABLET ORAL NIGHTLY PRN
Status: DISCONTINUED | OUTPATIENT
Start: 2023-05-25 | End: 2023-05-26

## 2023-05-25 RX ORDER — ALBUMIN, HUMAN INJ 5% 5 %
12.5 SOLUTION INTRAVENOUS ONCE AS NEEDED
Status: ACTIVE | OUTPATIENT
Start: 2023-05-25 | End: 2023-05-25

## 2023-05-25 RX ORDER — SODIUM CHLORIDE 9 MG/ML
INJECTION, SOLUTION INTRAVENOUS CONTINUOUS
Status: ACTIVE | OUTPATIENT
Start: 2023-05-25 | End: 2023-05-25

## 2023-05-25 RX ORDER — NEOSTIGMINE METHYLSULFATE 1 MG/ML
INJECTION, SOLUTION INTRAVENOUS AS NEEDED
Status: DISCONTINUED | OUTPATIENT
Start: 2023-05-25 | End: 2023-05-25 | Stop reason: SURG

## 2023-05-25 RX ORDER — NALOXONE HYDROCHLORIDE 0.4 MG/ML
80 INJECTION, SOLUTION INTRAMUSCULAR; INTRAVENOUS; SUBCUTANEOUS AS NEEDED
Status: ACTIVE | OUTPATIENT
Start: 2023-05-25 | End: 2023-05-25

## 2023-05-25 RX ADMIN — SODIUM CHLORIDE: 9 INJECTION, SOLUTION INTRAVENOUS at 10:55:00

## 2023-05-25 RX ADMIN — ESMOLOL HYDROCHLORIDE 20 MG: 10 INJECTION INTRAVENOUS at 09:11:00

## 2023-05-25 RX ADMIN — NEOSTIGMINE METHYLSULFATE 2 MG: 1 INJECTION, SOLUTION INTRAVENOUS at 10:36:00

## 2023-05-25 RX ADMIN — GLYCOPYRROLATE 0.4 MG: 0.2 INJECTION, SOLUTION INTRAMUSCULAR; INTRAVENOUS at 10:36:00

## 2023-05-25 RX ADMIN — NEOSTIGMINE METHYLSULFATE 1.5 MG: 1 INJECTION, SOLUTION INTRAVENOUS at 10:38:00

## 2023-05-25 RX ADMIN — ONDANSETRON 4 MG: 2 INJECTION INTRAMUSCULAR; INTRAVENOUS at 10:34:00

## 2023-05-25 RX ADMIN — GLYCOPYRROLATE 0.2 MG: 0.2 INJECTION, SOLUTION INTRAMUSCULAR; INTRAVENOUS at 10:38:00

## 2023-05-25 RX ADMIN — LIDOCAINE HYDROCHLORIDE 100 MG: 10 INJECTION, SOLUTION EPIDURAL; INFILTRATION; INTRACAUDAL; PERINEURAL at 09:11:00

## 2023-05-25 RX ADMIN — CEFAZOLIN SODIUM/WATER 2 G: 2 G/20 ML SYRINGE (ML) INTRAVENOUS at 09:26:00

## 2023-05-25 RX ADMIN — PROTAMINE SULFATE 90 MG: 10 INJECTION, SOLUTION INTRAVENOUS at 10:29:00

## 2023-05-25 RX ADMIN — HEPARIN SODIUM 18000 UNITS: 1000 INJECTION, SOLUTION INTRAVENOUS; SUBCUTANEOUS at 10:10:00

## 2023-05-25 RX ADMIN — ROCURONIUM BROMIDE 50 MG: 10 INJECTION, SOLUTION INTRAVENOUS at 09:12:00

## 2023-05-25 RX ADMIN — SODIUM CHLORIDE: 9 INJECTION, SOLUTION INTRAVENOUS at 09:11:00

## 2023-05-25 NOTE — ANESTHESIA PROCEDURE NOTES
Arterial Line    Date/Time: 5/25/2023 9:09 AM    Performed by: Kindra Salazar MD  Authorized by: Kindra Salazar MD    General Information and Staff    Procedure Start:  5/25/2023 9:09 AM  Procedure End:  5/25/2023 9:10 AM  Anesthesiologist:  Kindra Salazar MD  Performed By:  Anesthesiologist  Patient Location:  OR  Indication: continuous blood pressure monitoring and blood sampling needed    Site Identification: real time ultrasound guided and surface landmarks    Preanesthetic Checklist: 2 patient identifiers, IV checked, risks and benefits discussed, monitors and equipment checked, pre-op evaluation, timeout performed, anesthesia consent and sterile technique used    Procedure Details    Catheter Size:  20 G  Catheter Length:  1 and 3/4 inch  Catheter Type:  Arrow  Seldinger Technique?: Yes    Laterality:  Left  Site:  Radial artery  Site Prep: chlorhexidine    Line Secured:  Tape and Tegaderm    Assessment    Events: patient tolerated procedure well with no complications      Medications  5/25/2023 9:09 AM      Additional Comments

## 2023-05-25 NOTE — PROCEDURES
Cardiology Transesophageal Echo Note    PRE and POST PROCEDURE DIAGNOSIS:   1. Aortic stenosis, severe and symptomatic. PROCEDURE: Transesophageal Echocardiogram (PEDRO) - intra-operative during TAVR (transfemoral approach). The patient was already intubated and sedated by anesthesiologist. PEDRO probe placed by Dr. Nara Koehler. Mid-esophageal level and transesophageal views were obtained and reviewed. Gastric views were obtained. The patient was stable hemodynamically and tolerated procedure very well. No immediate post procedure complications. Findings:  Normal LVSF with estimated LVEF 60-65% with no regional wall motion abnormalities. RV size was normal and with preserved systolic function. Mild mitral insufficiency   Velocities 30 cm/s were seen in the HELADIO. There was no evidence for intracardiac mass or thrombus in the HELADIO. Heavily calcified aortic valve with severely reduced cusp separation with mild regurgitation. Very trivial pericardial effusion only in the transverse sinus space. Measurements:  - aortic valve annulus = approximately 2.8 cm  - mean gradient = 23 mmHg  - peak velocity = 3.4 m/sec    Intra-procedural:  Aortic valve was crossed with a guidewire and no pre-balloon valvuloplasty was performed. Then, a 29 mm Edward-Geovanna S3 Ultra Resilia valve was positioned across the aortic valve from transfemoral approach.    Valve centering position was confirmed by fluoro and PEDRO imaging and bioprosthetic valve was deployed while heart was paced fast.    Post valve deployment:  - no central aortic regurgitation  - Trivial perivalvular regurgitation and decision was made not to post dilate   - No change in LV systolic function or regional wall motion abnormalities from baseline  - new mean gradient 2 mmHg and peak velocity 1.0 m/s  - Mild mitral regurgitation unchanged from baseline  - no thoracic aorta root dissection  - widely opening new bioprosthetic aortic valve leaflets  - no annular disruption  - Very trivial pericardial effusion only in the transverse sinus space that is unchanged from baseline      Impression:  - A successful placement of a 29 mm  Edward-Geovanna S3 Ultra Resilia bioprosthetic aortic valve using transfemoral approach for symptomatic aortic stenosis. There was no central aortic regurgitation and trivial joey-valvular regurgitation.     Bhargav Dodd MD  5/25/2023  11:35 AM

## 2023-05-25 NOTE — PROGRESS NOTES
Prelim    #29 Geovanna 3 Ultra Resilia aortic valve via right CFA - excellent result    Perclose femoral vessels    Updated patient's wife immediately post-procedure     Ramiro Pickering/Yesenia

## 2023-05-25 NOTE — OPERATIVE REPORT
[facility]    PATIENT'S NAME: Lady Villegas   ATTENDING PHYSICIAN: Mely Booker M.D. OPERATING PHYSICIAN: Mely Booker M.D. PATIENT ACCOUNT#:   [de-identified]    LOCATION:  [address] [place] [address]  MEDICAL RECORD #:   [id number]       YOB: 1945  ADMISSION DATE:       05/25/2023      OPERATION DATE:  05/25/2023    OPERATIVE REPORT    PREOPERATIVE DIAGNOSIS:    POSTOPERATIVE DIAGNOSIS:    PROCEDURE PERFORMED:  Percutaneous transcatheter aortic valve replacement. HISTORY:  The patient is a 70-year-old gentleman referred for an attempt at percutaneous transcatheter aortic valve replacement due to the presence of severe calcific aortic stenosis. PROCEDURE:  After obtaining informed consent, the patient was brought to the hybrid operating room and placed under general anesthesia by Dr. Clive Ray. Using ultrasound guidance and a micropuncture technique, a 6-Peruvian sheath was placed in the right common femoral artery and a 6-Peruvian sheath in the left common femoral artery. The right-sided sheath was upsized to the 16-Peruvian delivery sheath. A pigtail catheter was placed in the ascending aorta. We placed a #29 Geovanna 3 Ultra Resilia aortic valve at nominal pressure using the SavvyWire. The valve sat beautifully. There was no evidence of aortic disruption or significant aortic insufficiency. At the end of the procedure, hemostasis was achieved by placing Perclose devices in the femoral vessels. Every attempt was made to minimize the contrast load due to the patient's underlying renal insufficiency. Patient was taken in good condition to the coronary care unit postprocedure for further management. I updated the patient's wife on her 's condition immediately after the procedure. Co- in this case was Dr. Eduar Lr. Dr. Hattie Ramon performed the transesophageal echocardiographic images, and a separate dictation will outline those findings.     Dictated By Mely Booker, M.D.  d: 05/25/2023 10:43:56  t: 05/25/2023 11:37:14  [facility] [id number]  WS/

## 2023-05-25 NOTE — PROGRESS NOTES
Health system Pharmacy Note:  Renal Dose Adjustment    Cece Zuniga has been prescribed famotidine (PEPCID) 20 mg intravenously and orally every 12 hours. Estimated Creatinine Clearance: 32.2 mL/min (A) (based on SCr of 1.83 mg/dL (H)). Calculated creatinine clearance is < 50 ml/min, therefore the dose of famotidine (Pepcid) has been changed to 20 mg intravenously and orally every 24 hours per P&T approved protocol. Pharmacy will continue to follow, and if renal function improves, will resume the original order.     Thank you,  Elijah Ghotra, PharmD  5/25/2023 10:49 AM

## 2023-05-25 NOTE — ANESTHESIA PROCEDURE NOTES
Peripheral IV  Date/Time: 5/25/2023 9:20 AM  Inserted by: Johnna Colón MD    Placement  Needle size: 18 G  Laterality: left  Location: hand  Site prep: alcohol  Technique: anatomical landmarks  Attempts: 1

## 2023-05-25 NOTE — PLAN OF CARE
Pt received from the CVOR s/p TAVR valve placement. Pt extubated on simple face mask. Pt received with radial arterial line in place. Blood pressure elevated upon arrival to unit at 180-200's. NTG gtt initiated followed by hydralizine ivp without improvement. Continued to increase NTG up to 50mcg without change in pressure. Spoke briefly with cardiology, in which cardene gtt ordered and initiated with goal <160. Pt then expressing mild mid/left chest pain. CP without radiation to jaw, arm, shoulder blades, back or abdomen. Bilateral groin sites soft with strong doppler pedal pulses present. As blood pressure improving chest discomfort improving as well. Pt currently pain free. NTG and cardene gtts weaned off. Initiated home meds. Pt given lasix for diuresis. Potassium replaced per protocol. Pt completed flat bedrest restrictions and is sitting HOB 30 degrees. Wife at bedside.

## 2023-05-25 NOTE — ANESTHESIA PROCEDURE NOTES
Airway  Date/Time: 5/25/2023 9:15 AM  Urgency: elective    Airway not difficult    General Information and Staff    Patient location during procedure: OR  Anesthesiologist: Nico Colón MD  Performed: anesthesiologist   Performed by: Dayron Sanchez MD  Authorized by: Dayron Sanchez MD      Indications and Patient Condition  Indications for airway management: anesthesia  Spontaneous Ventilation: absent  Sedation level: deep  Preoxygenated: yes  Patient position: sniffing  Mask difficulty assessment: 1 - vent by mask    Final Airway Details  Final airway type: endotracheal airway      Successful airway: ETT  Cuffed: yes   Successful intubation technique: direct laryngoscopy  Endotracheal tube insertion site: oral  Blade: Chantelle  Blade size: #3  ETT size (mm): 7.5    Cormack-Lehane Classification: grade III - view of epiglottis only (floppy epiglottis obscuring view)  Placement verified by: chest auscultation and capnometry   Measured from: lips  ETT to lips (cm): 23  Number of attempts at approach: 1

## 2023-05-25 NOTE — ANESTHESIA PROCEDURE NOTES
Procedure Performed: PEDRO       Start Time:        End Time:      Preanesthesia Checklist:  Patient identified, IV assessed, risks and benefits discussed, monitors and equipment assessed, procedure being performed at surgeon's request and anesthesia consent obtained. General Procedure Information  Diagnostic Indications for Echo:  assessment of ascending aorta  Physician Requesting Echo: Marisela Vasquez MD  Location performed:  OR  Intubated  Bite block placed  Heart visualized  Probe Insertion:  Easy  Probe Type:  Multiplane        Anesthesia Information  Performed Personally  Anesthesiologist:  Christian Colón MD      Echocardiogram Comments:        Insertion only

## 2023-05-25 NOTE — OPERATIVE REPORT
Operative Note    Patient Name: Anshul Cox    Preoperative Diagnosis: Severe symptomatic aortic stenosis    Postoperative Diagnosis: Severe symptomatic aortic stenosis    Surgeon(s) and Role:     Jakob Wells MD - Primary     * Cecil Dobson MD - Surgical Assistant     * Soco Monroy MD     Assistant: PA: Isabell Cardoso    PROCEDURE PERFORMED:   Transcatheter aortic valve replacement with 29 Geovanna S3 Ultra Resilia aortic valve  Right common femoral artery transfemoral approach percutaneously    CARDIOLOGISTS: Megan Calderón  TRANSESOPHAGEAL ECHOCARDIOGRAPHER: Erica Liang    INDICATION:     65year old male with severe aortic stenosis. The assessment is that the patient is high risk for open AVR and would be best served by TAVR. This is bases on STS score and co-morbidities. PROCEDURE:  The procedure was performed by Gwen Rosario and Chelsie Joseph. See additional operative notes from the cardiology team.  The PEDRO probe was placed by anesthesia. The pt was prepped and draped in the usual sterile fashion and a time out performed. The right femoral artery was used for TAVR insertion and the left femoral for the pigtail catheter. The femoral artery was perclosed. A transvenous pacing wire was placed via the left femoral vein. Heparin was given and the ACT was appropriate. The valve was then crossed and an exchange catheter used to place a safari wire in the left ventricle. A second time out was then performed. The 34 Geovanna Ting Jluis was then advanced and deployed with rapid ventricular pacing. The valve seated well. PEDRO evaluation did not reveal any significant effusion, there was no paravalvular leak. There were no complications evident. There was minimal blood loss. The pt was then transferred to the CVICU in stable condition.        Anesthesia: Cardiac    Complications: None    Implants: 29mm Keene Geovanna 3 Ultra Resilia TAVR valve    Specimen: None    Drains: None    Condition: Stable to CVICU    Estimated Blood Loss: less than Nai Simpson MD

## 2023-05-26 ENCOUNTER — APPOINTMENT (OUTPATIENT)
Dept: GENERAL RADIOLOGY | Facility: HOSPITAL | Age: 78
DRG: 267 | End: 2023-05-26
Attending: NURSE PRACTITIONER
Payer: MEDICARE

## 2023-05-26 ENCOUNTER — APPOINTMENT (OUTPATIENT)
Dept: CV DIAGNOSTICS | Facility: HOSPITAL | Age: 78
End: 2023-05-26
Attending: NURSE PRACTITIONER
Payer: MEDICARE

## 2023-05-26 ENCOUNTER — APPOINTMENT (OUTPATIENT)
Dept: CV DIAGNOSTICS | Facility: HOSPITAL | Age: 78
DRG: 267 | End: 2023-05-26
Attending: NURSE PRACTITIONER
Payer: MEDICARE

## 2023-05-26 ENCOUNTER — APPOINTMENT (OUTPATIENT)
Dept: GENERAL RADIOLOGY | Facility: HOSPITAL | Age: 78
End: 2023-05-26
Attending: NURSE PRACTITIONER
Payer: MEDICARE

## 2023-05-26 VITALS
DIASTOLIC BLOOD PRESSURE: 60 MMHG | RESPIRATION RATE: 21 BRPM | HEART RATE: 73 BPM | TEMPERATURE: 98 F | SYSTOLIC BLOOD PRESSURE: 156 MMHG | BODY MASS INDEX: 29.4 KG/M2 | HEIGHT: 68 IN | WEIGHT: 194 LBS | OXYGEN SATURATION: 96 %

## 2023-05-26 LAB
ANION GAP SERPL CALC-SCNC: 5 MMOL/L (ref 0–18)
BILIRUB UR QL STRIP.AUTO: NEGATIVE
BLOOD TYPE BARCODE: 6200
BUN BLD-MCNC: 43 MG/DL (ref 7–18)
CALCIUM BLD-MCNC: 8.8 MG/DL (ref 8.5–10.1)
CHLORIDE SERPL-SCNC: 108 MMOL/L (ref 98–112)
CLARITY UR REFRACT.AUTO: CLEAR
CO2 SERPL-SCNC: 24 MMOL/L (ref 21–32)
CREAT BLD-MCNC: 1.95 MG/DL
ERYTHROCYTE [DISTWIDTH] IN BLOOD BY AUTOMATED COUNT: 13 %
GFR SERPLBLD BASED ON 1.73 SQ M-ARVRAT: 35 ML/MIN/1.73M2 (ref 60–?)
GLUCOSE BLD-MCNC: 89 MG/DL (ref 70–99)
GLUCOSE UR STRIP.AUTO-MCNC: NEGATIVE MG/DL
HCT VFR BLD AUTO: 30 %
HGB BLD-MCNC: 10.2 G/DL
INR BLD: 1.24 (ref 0.85–1.16)
KETONES UR STRIP.AUTO-MCNC: NEGATIVE MG/DL
MAGNESIUM SERPL-MCNC: 2.1 MG/DL (ref 1.6–2.6)
MCH RBC QN AUTO: 33.6 PG (ref 26–34)
MCHC RBC AUTO-ENTMCNC: 34 G/DL (ref 31–37)
MCV RBC AUTO: 98.7 FL
NITRITE UR QL STRIP.AUTO: NEGATIVE
OSMOLALITY SERPL CALC.SUM OF ELEC: 294 MOSM/KG (ref 275–295)
PH UR STRIP.AUTO: 6 [PH] (ref 5–8)
PLATELET # BLD AUTO: 172 10(3)UL (ref 150–450)
POTASSIUM SERPL-SCNC: 3.9 MMOL/L (ref 3.5–5.1)
POTASSIUM SERPL-SCNC: 3.9 MMOL/L (ref 3.5–5.1)
PROT UR STRIP.AUTO-MCNC: NEGATIVE MG/DL
PROTHROMBIN TIME: 15.5 SECONDS (ref 11.6–14.8)
RBC # BLD AUTO: 3.04 X10(6)UL
SODIUM SERPL-SCNC: 137 MMOL/L (ref 136–145)
SP GR UR STRIP.AUTO: 1.01 (ref 1–1.03)
UNIT VOLUME: 350 ML
UROBILINOGEN UR STRIP.AUTO-MCNC: <2 MG/DL
WBC # BLD AUTO: 10.4 X10(3) UL (ref 4–11)

## 2023-05-26 PROCEDURE — 93306 TTE W/DOPPLER COMPLETE: CPT | Performed by: NURSE PRACTITIONER

## 2023-05-26 PROCEDURE — 93010 ELECTROCARDIOGRAM REPORT: CPT | Performed by: INTERNAL MEDICINE

## 2023-05-26 PROCEDURE — 99211 OFF/OP EST MAY X REQ PHY/QHP: CPT

## 2023-05-26 PROCEDURE — 97116 GAIT TRAINING THERAPY: CPT

## 2023-05-26 PROCEDURE — 83735 ASSAY OF MAGNESIUM: CPT | Performed by: NURSE PRACTITIONER

## 2023-05-26 PROCEDURE — 81001 URINALYSIS AUTO W/SCOPE: CPT | Performed by: PHYSICIAN ASSISTANT

## 2023-05-26 PROCEDURE — 85027 COMPLETE CBC AUTOMATED: CPT | Performed by: NURSE PRACTITIONER

## 2023-05-26 PROCEDURE — 97165 OT EVAL LOW COMPLEX 30 MIN: CPT

## 2023-05-26 PROCEDURE — 84132 ASSAY OF SERUM POTASSIUM: CPT | Performed by: INTERNAL MEDICINE

## 2023-05-26 PROCEDURE — 97530 THERAPEUTIC ACTIVITIES: CPT

## 2023-05-26 PROCEDURE — 87086 URINE CULTURE/COLONY COUNT: CPT | Performed by: PHYSICIAN ASSISTANT

## 2023-05-26 PROCEDURE — 93005 ELECTROCARDIOGRAM TRACING: CPT

## 2023-05-26 PROCEDURE — 85610 PROTHROMBIN TIME: CPT | Performed by: NURSE PRACTITIONER

## 2023-05-26 PROCEDURE — 97161 PT EVAL LOW COMPLEX 20 MIN: CPT

## 2023-05-26 PROCEDURE — 80048 BASIC METABOLIC PNL TOTAL CA: CPT | Performed by: NURSE PRACTITIONER

## 2023-05-26 PROCEDURE — 71045 X-RAY EXAM CHEST 1 VIEW: CPT | Performed by: NURSE PRACTITIONER

## 2023-05-26 RX ORDER — HYDRALAZINE HYDROCHLORIDE 50 MG/1
50 TABLET, FILM COATED ORAL 2 TIMES DAILY
Status: DISCONTINUED | OUTPATIENT
Start: 2023-05-26 | End: 2023-05-26

## 2023-05-26 RX ORDER — TAMSULOSIN HYDROCHLORIDE 0.4 MG/1
0.4 CAPSULE ORAL
Status: DISCONTINUED | OUTPATIENT
Start: 2023-05-26 | End: 2023-05-26

## 2023-05-26 RX ORDER — FUROSEMIDE 20 MG/1
20 TABLET ORAL DAILY
Status: SHIPPED | COMMUNITY
Start: 2023-05-26

## 2023-05-26 RX ORDER — CLOPIDOGREL BISULFATE 75 MG/1
75 TABLET ORAL DAILY
Status: DISCONTINUED | OUTPATIENT
Start: 2023-05-26 | End: 2023-05-26

## 2023-05-26 RX ORDER — TAMSULOSIN HYDROCHLORIDE 0.4 MG/1
0.4 CAPSULE ORAL DAILY
Qty: 30 CAPSULE | Refills: 0 | Status: SHIPPED | OUTPATIENT
Start: 2023-05-26 | End: 2023-06-25

## 2023-05-26 RX ORDER — ASPIRIN 81 MG/1
81 TABLET ORAL DAILY
Status: DISCONTINUED | OUTPATIENT
Start: 2023-05-26 | End: 2023-05-26

## 2023-05-26 RX ORDER — HYDRALAZINE HYDROCHLORIDE 50 MG/1
50 TABLET, FILM COATED ORAL 2 TIMES DAILY
Qty: 60 TABLET | Refills: 3 | Status: SHIPPED
Start: 2023-05-26

## 2023-05-26 RX ORDER — CARVEDILOL 6.25 MG/1
3.12 TABLET ORAL 2 TIMES DAILY WITH MEALS
Status: SHIPPED | COMMUNITY
Start: 2023-05-26

## 2023-05-26 RX ORDER — TAMSULOSIN HYDROCHLORIDE 0.4 MG/1
0.4 CAPSULE ORAL DAILY
Qty: 30 CAPSULE | Refills: 0 | Status: SHIPPED | OUTPATIENT
Start: 2023-05-26 | End: 2023-05-26

## 2023-05-26 NOTE — OPERATIVE REPORT
[facility]    PATIENT'S NAME: Jatinder Camp   ATTENDING PHYSICIAN: Nancy Key M.D. OPERATING PHYSICIAN: Alfreda Caruso M.D. PATIENT ACCOUNT#:   [de-identified]    LOCATION:  [address] [place] [address]  MEDICAL RECORD #:   [id number]       YOB: 1945  ADMISSION DATE:       05/25/2023      OPERATION DATE:  05/25/2023    OPERATIVE REPORT    PREOPERATIVE DIAGNOSIS:    1. Severe symptomatic aortic stenosis. 2.   History of hypertension. 3.   Coronary artery disease. POSTOPERATIVE DIAGNOSIS:    PROCEDURE PERFORMED:  TAVR placement. OPERATORS:  Nancy Key MD; Alfreda Caruso MD.    DESCRIPTION OF PROCEDURE:  After informed consent was obtained, patient was prepped and draped in a sterile fashion in the hybrid operating room. The patient was under general anesthesia. Using ultrasound and micropuncture technique, right and left common femoral arteries were cannulated. Two Perclose devices were placed in a pre-closed fashion in the right common femoral artery. The existing sheath was upsized to a [employer]. Heparin was given to maintain ACT over 250 seconds. Next, a pigtail catheter was placed in the aortic annulus to obtain images of the annulus. An AL1 catheter with a straight wire was used to get into the left ventricle. Using a pigtail catheter, a SavvyWire was placed in the left ventricular apex. A 29 mm Keene S3 Ultra valve with Resilia was prepped and brought into the abdominal aorta. This was paired with its balloon and brought across the aortic valve. With pacing with the SavvyWire, the valve was deployed without difficulty. Followup PEDRO interrogation revealed good placement of the valve without significant insufficiency. No disruption of aortic structures was noted. The procedure was terminated. The delivery device was removed. The Perclose devices were reversed to achieve hemostasis in the right common femoral artery site.   A Perclose device was used to seal the left common femoral artery site. There were no apparent acute complications noted, and the patient tolerated the procedure well. SUMMARY:  In summary, the patient today underwent placement of a 29 mm Keene S3 Ultra valve as discussed above.     Dictated By Bob Ruvalcaba M.D.  d: 05/25/2023 13:14:37  t: 05/25/2023 14:41:27  [facility] [id number]  AR/

## 2023-05-26 NOTE — PROGRESS NOTES
05/26/23 1455   Clinical Encounter Type   Visited With Patient and family together   Routine Visit Introduction   Continue Visiting No   Patient's Supportive Strategies/Resources Family, Fanta   Family Spiritual Encounters   Family Coping Accepting   Family Participation in Care Consistently   Family Support During Treatment Consistently   Taxonomy   Intended Effects Demonstrate caring and concern   Methods Offer support; Offer spiritual/Rastafarian support   Interventions Acknowledge current situation; Acknowledge response to difficult experience; Active listening; Ask guided questions; Ask guided questions about fanta; Ask questions to bring forth feelings     Discussion:   responded to a [address] Pt (Dong Bowling) was sitting up in bed and reported that he would be going home. His wife Leo Villagomez) joined him. Together they shared about their life together. Gaby Pool was finished cancer treatment last year and shared that it was now her turn to care for Dong Bowling. They appreciated the visit and requested a prayer.  prayed for Leon's recovery. After prayer, Gaby Pool expressed her appreciation for the visit. Spiritual Care support can be requested via an [facility] consult or ext. [phone].        Erin Madrigal M.Div, Chaplain Resident  Ext. [phone]

## 2023-05-26 NOTE — PLAN OF CARE
Notified by rn that pt having urinary retention. Has not had urine out put since procedure. Bladder scan with 600cc. Unable to straight cath with resistance. Requested to have urology consult.

## 2023-05-26 NOTE — PLAN OF CARE
Pt with urinary retention unable to void at bedside by standing/sitting nor on/at the toilet. Attempted 15F straight catheter unsuccessfully able to pass the catheter beyond a few millimeters without resistance or pain. Attempted with 14F coude catheter, again, same results. Contacted cardiology apn for urology consult. Spoke with urologist, ordering lidocaine injection and attempt with 12F catheter. Still if unsuccessful, to contact physician again. Speaking further with pt again, obtained 6F catheter. Night RN able to pass 6F catheter to begin draining of bladder.

## 2023-05-26 NOTE — PLAN OF CARE
Resumed care at 0730. Aox4, vitals stable. OOB in chair, ambulatory in hallway with steady gait. Urology consulted for retention. Ok to start flomax per cards. Patient able to void multiple times, PVR < 150. Updated urology patricia VIDAL for patient to discharge with follow up. [id number] Estrella Mark for patient to discharge per cards. BP high before discharge, notified Denise TORRES. Restarted PO hydralazine, given dose and recheck SBP < 160. [id number] Estrella Mark for discharge. Discharge instructions given and explained to patient. Per patient request, given [facility] urology group info. Ivs removed. Wheelchair to lobby. Wife at bedside, give ride home. Discharged with all belongings.

## 2023-05-26 NOTE — CDS QUERY
Jose Francisco Amos     Dear Dr. [facility]  Based on your judgment and the clinical indicators below, could you please clarify the type of anemia being monitored? [   ] Acute blood loss anemia  [   ] Other anemia (please specify):   [   ] Unable to determine       Documentation from the Medical Record:     Risk Factor: Recent TAVR    Clinical Indicators:   ___ 5/2 Hgb 13.2  (Pre-op lab)  ___ 5/25 Op Report: TAVR, EBL < 50 mL  ___ 5/25 Hgb 10.5  ___ 5/26 Hgb 10.2   ___ 5/26 Cardiology Progress Note: Aneia - hgb 10.2 post op      Treatment:    Monitor daily labs while in house          For questions regarding this query, please contact Clinical Documentation :   Kinjal Bustos RN      Cell# [phone]         Thank you!

## 2023-05-26 NOTE — CDS QUERY
Chronic Kidney Disease  Shantelle Hernandez    Dear Dr. [facility],   Based on your judgment and the clinical indicators below, please clarify the stage of CKD:    [  ] Chronic Kidney Disease Stage 3 (moderate) with eGFR 30-59  [  ] Other (please specify):         Risk Factors/Clinical Indicators:     History of DM II, HTN, CAD, Chr HFrEF    Lab Results:    5/26   BUN 43   CR 1.95   GFR 35     5/25   BUN 46   CR 1.70   GFR 41     5/2     BUN 50   CR 1.80   GFR 38     4/17   BUN 30   CR 1.76   GFR 36    Treatment: Interval Lab Monitoring         For questions regarding this query, please contact Clinical :   Eileen Waterman RN CCDS   Cell# [phone]                          Thank you!

## 2023-05-26 NOTE — PLAN OF CARE
Pt received at 1900 on 5/25. Pt remained off pressor during noc. A line removed in AM. ZAID Sosa able to pass straight cath at 1900. Pt continued difficulty voiding and required straight cath at 0530. Pt states he has seen a [facility] previously but \"does not want anything to do with them\". Endorsed to day shift RNAngela. Will continue to monitor.

## 2023-05-27 LAB
ATRIAL RATE: 79 BPM
P AXIS: 47 DEGREES
P-R INTERVAL: 338 MS
Q-T INTERVAL: 398 MS
QRS DURATION: 104 MS
QTC CALCULATION (BEZET): 456 MS
R AXIS: -12 DEGREES
T AXIS: 116 DEGREES
VENTRICULAR RATE: 79 BPM

## 2023-06-12 ENCOUNTER — HOSPITAL ENCOUNTER (OUTPATIENT)
Age: 78
Discharge: LEFT BEFORE DISCHARGE | End: 2023-06-12
Payer: MEDICARE

## 2023-06-12 ENCOUNTER — APPOINTMENT (OUTPATIENT)
Dept: GENERAL RADIOLOGY | Age: 78
End: 2023-06-12
Attending: NURSE PRACTITIONER
Payer: MEDICARE

## 2023-06-12 VITALS
BODY MASS INDEX: 28.19 KG/M2 | RESPIRATION RATE: 20 BRPM | HEIGHT: 68 IN | DIASTOLIC BLOOD PRESSURE: 77 MMHG | SYSTOLIC BLOOD PRESSURE: 194 MMHG | HEART RATE: 64 BPM | OXYGEN SATURATION: 97 % | WEIGHT: 186 LBS

## 2023-06-12 DIAGNOSIS — N18.9 CHRONIC KIDNEY DISEASE, UNSPECIFIED CKD STAGE: ICD-10-CM

## 2023-06-12 DIAGNOSIS — R41.82 ALTERED MENTAL STATUS, UNSPECIFIED ALTERED MENTAL STATUS TYPE: Primary | ICD-10-CM

## 2023-06-12 LAB
#MXD IC: 1 X10ˆ3/UL (ref 0.1–1)
BUN BLD-MCNC: 51 MG/DL (ref 7–18)
CHLORIDE BLD-SCNC: 102 MMOL/L (ref 98–112)
CO2 BLD-SCNC: 23 MMOL/L (ref 21–32)
CREAT BLD-MCNC: 2.6 MG/DL
GFR SERPLBLD BASED ON 1.73 SQ M-ARVRAT: 24 ML/MIN/1.73M2 (ref 60–?)
GLUCOSE BLD-MCNC: 112 MG/DL (ref 70–99)
HCT VFR BLD AUTO: 31.9 %
HCT VFR BLD CALC: 32 %
HGB BLD-MCNC: 10.8 G/DL
ISTAT IONIZED CALCIUM FOR CHEM 8: 1.17 MMOL/L (ref 1.12–1.32)
LYMPHOCYTES # BLD AUTO: 2.3 X10ˆ3/UL (ref 1–4)
LYMPHOCYTES NFR BLD AUTO: 28.2 %
MCH RBC QN AUTO: 32.8 PG (ref 26–34)
MCHC RBC AUTO-ENTMCNC: 33.9 G/DL (ref 31–37)
MCV RBC AUTO: 97 FL (ref 80–100)
MIXED CELL %: 11.6 %
NEUTROPHILS # BLD AUTO: 5 X10ˆ3/UL (ref 1.5–7.7)
NEUTROPHILS NFR BLD AUTO: 60.2 %
PLATELET # BLD AUTO: 280 X10ˆ3/UL (ref 150–450)
POCT BILIRUBIN URINE: NEGATIVE
POCT BLOOD URINE: NEGATIVE
POCT GLUCOSE URINE: NEGATIVE MG/DL
POCT KETONE URINE: NEGATIVE MG/DL
POCT LEUKOCYTE ESTERASE URINE: NEGATIVE
POCT NITRITE URINE: NEGATIVE
POCT PH URINE: 6 (ref 5–8)
POCT PROTEIN URINE: 30 MG/DL
POCT SPECIFIC GRAVITY URINE: 1.01
POCT UROBILINOGEN URINE: 0.2 MG/DL
POTASSIUM BLD-SCNC: 4.3 MMOL/L (ref 3.6–5.1)
RBC # BLD AUTO: 3.29 X10ˆ6/UL
SODIUM BLD-SCNC: 137 MMOL/L (ref 136–145)
TROPONIN I BLD-MCNC: 0.03 NG/ML
WBC # BLD AUTO: 8.3 X10ˆ3/UL (ref 4–11)

## 2023-06-12 PROCEDURE — 84484 ASSAY OF TROPONIN QUANT: CPT | Performed by: NURSE PRACTITIONER

## 2023-06-12 PROCEDURE — 80047 BASIC METABLC PNL IONIZED CA: CPT | Performed by: NURSE PRACTITIONER

## 2023-06-12 PROCEDURE — 93000 ELECTROCARDIOGRAM COMPLETE: CPT | Performed by: NURSE PRACTITIONER

## 2023-06-12 PROCEDURE — 81002 URINALYSIS NONAUTO W/O SCOPE: CPT | Performed by: NURSE PRACTITIONER

## 2023-06-12 PROCEDURE — 71046 X-RAY EXAM CHEST 2 VIEWS: CPT | Performed by: NURSE PRACTITIONER

## 2023-06-12 PROCEDURE — 85025 COMPLETE CBC W/AUTO DIFF WBC: CPT | Performed by: NURSE PRACTITIONER

## 2023-06-12 PROCEDURE — 99204 OFFICE O/P NEW MOD 45 MIN: CPT | Performed by: NURSE PRACTITIONER

## 2023-06-12 NOTE — ED INITIAL ASSESSMENT (HPI)
Per wife she wants pt to be evaluated for a uti. Wife states pt has a change in his behavior. Wife states the pt has been repeating himself . Pt's wife states when she called the PMD , PMD suggested the pt come in and submit a urine sample.

## 2023-06-13 LAB
ATRIAL RATE: 68 BPM
P AXIS: 59 DEGREES
P-R INTERVAL: 376 MS
Q-T INTERVAL: 446 MS
QRS DURATION: 96 MS
QTC CALCULATION (BEZET): 474 MS
R AXIS: -17 DEGREES
T AXIS: 40 DEGREES
VENTRICULAR RATE: 68 BPM

## 2023-06-13 NOTE — PROCEDURES
[facility]    PATIENT'S NAME: Ming Puckett   ATTENDING PHYSICIAN: Ursula Cheung M.D. OPERATING PHYSICIAN: Benja Jaffe MD   PATIENT ACCOUNT#:   [de-identified]    LOCATION:  [address] [place] [address]  MEDICAL RECORD #:   [id number]       YOB: 1945  ADMISSION DATE:       03/01/2023      OPERATION DATE:  03/08/2023    CARDIAC PROCEDURE TRANSCRIPTION    PERCUTANEOUS CORONARY INTERVENTION     PREOPERATIVE DIAGNOSIS:    1. Coronary disease. 2.   Ischemic cardiomyopathy. POSTOPERATIVE DIAGNOSIS:    1. Coronary disease. 2.   Ischemic cardiomyopathy. PROCEDURE PERFORMED:    1. Percutaneous transluminal coronary angioplasty with drug-eluting stent x4 of the circumflex. 2.   Intravascular ultrasound of the circumflex. 3.   Percutaneous transluminal coronary angioplasty with drug-eluting stent x2 of the left anterior descending. 4.   Intravascular ultrasound of the left anterior descending. PROCEDURAL DETAILS:  After obtaining informed consent, we obtained access in the right femoral artery using micropuncture technique. An EBU 3.75 guide was advanced over a wire and used to engage the left main. The circumflex was wired with a Sanjay blue into the second obtuse marginal.  This was predilated with a 2.5 balloon and stented with a 2.5 x 12 Garrett drug-eluting stent. We then wired the first obtuse marginal.  This was predilated with a 2.5 balloon, followed by intravascular ultrasound. After intravascular ultrasound, the second obtuse marginal was stented with a 3.0 x 38 Synergy drug-eluting stent. The mid circumflex was stented with a 3.5 x 38 drug-eluting stent and the proximal was stented with a 3.5 x 38 Synergy drug-eluting stent. The circumflex was postdilated with a 3.5 noncompliant balloon. The obtuse marginal was postdilated with a 3.0 noncompliant balloon. Repeat intravascular ultrasound showed good stent apposition, good stent deployment, no edge dissection.   Final angiography showed no residual stenosis and ALESSIA 3 flow. The LAD was then wired with a Sanjay blue. The distal LAD was predilated with a 2.5 balloon, followed by intravascular ultrasound. After intravascular ultrasound, the distal LAD was stented with a 3.5 x 18 Synergy drug-eluting stent and postdilated with a 3.0 noncompliant balloon. The proximal portion was stented with a 4.0 x 34 Synergy drug-eluting stent and postdilated with a 4.0 noncompliant balloon. Repeat intravascular ultrasound showed no edge dissection and good stent deployment and apposition. Final angiography showed no residual stenosis and ALESSIA 3 flow. EBU guide was removed over wire, Perclose device was deployed in the right femoral artery for hemostasis, and the patient was transferred to Recovery in stable condition. COMPLICATIONS:  None. ESTIMATED BLOOD LOSS:  2 mL. CONCLUSIONS:    1. Successful angioplasty of the circumflex with reduction in stenosis from 80% to 0 and improvement of flow from ALESSIA 3 to ALESSIA 3 with IVUS guidance. 2.   Successful angioplasty of the left anterior descending with reduction in stenosis from 95% to 0 and improvement of flow from ALESSIA 3 to ALESSIA 3 with IVUS guidance.     Dictated By Belgica Clements MD  d: 03/08/2023 14:04:57  t: 03/08/2023 20:48:19  [facility] [id number]  / 21 year old male with no pmhx presents with injury secondary to dog bite 2 hours ago from  his 2 dogs from fighting with each other. Mom at bedside. He has injury to circumferential left thumb, he is unsure of possible puncture through. He also has superficial cuts to right thumb. He is unsure of his tetanus status. Dogs are fully vaccinated. He has not taken any pain medications yet, he feels pain is controlled.   update the tetanu- augmentin - xray lef thumb and woudn care

## 2023-06-30 ENCOUNTER — TELEPHONE (OUTPATIENT)
Dept: ORTHOPEDICS CLINIC | Facility: CLINIC | Age: 78
End: 2023-06-30

## 2023-06-30 DIAGNOSIS — M25.552 BILATERAL HIP PAIN: Primary | ICD-10-CM

## 2023-06-30 DIAGNOSIS — M25.551 BILATERAL HIP PAIN: Primary | ICD-10-CM

## 2023-07-05 ENCOUNTER — LAB ENCOUNTER (OUTPATIENT)
Dept: LAB | Facility: HOSPITAL | Age: 78
End: 2023-07-05
Attending: Other
Payer: MEDICARE

## 2023-07-05 DIAGNOSIS — G93.40 ACUTE ENCEPHALOPATHY: ICD-10-CM

## 2023-07-05 LAB
FOLATE SERPL-MCNC: 31.6 NG/ML (ref 8.7–?)
T4 FREE SERPL-MCNC: 1.2 NG/DL (ref 0.8–1.7)
TSI SER-ACNC: 2.14 MIU/ML (ref 0.36–3.74)
VIT B12 SERPL-MCNC: 542 PG/ML (ref 193–986)

## 2023-07-05 PROCEDURE — 82607 VITAMIN B-12: CPT

## 2023-07-05 PROCEDURE — 84443 ASSAY THYROID STIM HORMONE: CPT

## 2023-07-05 PROCEDURE — 84425 ASSAY OF VITAMIN B-1: CPT

## 2023-07-05 PROCEDURE — 84439 ASSAY OF FREE THYROXINE: CPT

## 2023-07-05 PROCEDURE — 36415 COLL VENOUS BLD VENIPUNCTURE: CPT

## 2023-07-05 PROCEDURE — 82746 ASSAY OF FOLIC ACID SERUM: CPT

## 2023-07-07 ENCOUNTER — TELEPHONE (OUTPATIENT)
Dept: NEUROLOGY | Facility: CLINIC | Age: 78
End: 2023-07-07

## 2023-07-07 NOTE — TELEPHONE ENCOUNTER
Pt spouse Therese Portillo is requesting we call [employer] [id number]  to confirm address to [facility] .  Pt spouse is requesting a call back T#[phone];

## 2023-07-08 LAB — VITAMIN B1 WHOLE BLD: 193.6 NMOL/L

## 2023-07-10 ENCOUNTER — LAB ENCOUNTER (OUTPATIENT)
Dept: LAB | Age: 78
End: 2023-07-10
Attending: FAMILY MEDICINE
Payer: MEDICARE

## 2023-07-10 ENCOUNTER — OFFICE VISIT (OUTPATIENT)
Dept: FAMILY MEDICINE CLINIC | Facility: CLINIC | Age: 78
End: 2023-07-10
Payer: MEDICARE

## 2023-07-10 VITALS
TEMPERATURE: 98 F | HEIGHT: 68 IN | BODY MASS INDEX: 30.31 KG/M2 | SYSTOLIC BLOOD PRESSURE: 138 MMHG | RESPIRATION RATE: 16 BRPM | HEART RATE: 60 BPM | DIASTOLIC BLOOD PRESSURE: 78 MMHG | WEIGHT: 200 LBS | OXYGEN SATURATION: 98 %

## 2023-07-10 DIAGNOSIS — Z90.49 HISTORY OF HEMICOLECTOMY: ICD-10-CM

## 2023-07-10 DIAGNOSIS — R79.89 AZOTEMIA: ICD-10-CM

## 2023-07-10 DIAGNOSIS — Z00.00 ENCOUNTER FOR ANNUAL HEALTH EXAMINATION: Primary | ICD-10-CM

## 2023-07-10 DIAGNOSIS — R33.9 URINARY RETENTION: ICD-10-CM

## 2023-07-10 DIAGNOSIS — I10 PRIMARY HYPERTENSION: ICD-10-CM

## 2023-07-10 DIAGNOSIS — G31.84 MILD COGNITIVE IMPAIRMENT: ICD-10-CM

## 2023-07-10 DIAGNOSIS — S49.91XD INJURY OF RIGHT SHOULDER, SUBSEQUENT ENCOUNTER: ICD-10-CM

## 2023-07-10 DIAGNOSIS — K59.00 CONSTIPATION, UNSPECIFIED CONSTIPATION TYPE: ICD-10-CM

## 2023-07-10 DIAGNOSIS — E11.69 TYPE 2 DIABETES MELLITUS WITH OTHER SPECIFIED COMPLICATION, WITHOUT LONG-TERM CURRENT USE OF INSULIN (HCC): ICD-10-CM

## 2023-07-10 DIAGNOSIS — G47.00 INSOMNIA, UNSPECIFIED TYPE: ICD-10-CM

## 2023-07-10 DIAGNOSIS — G89.29 CHRONIC RIGHT SHOULDER PAIN: ICD-10-CM

## 2023-07-10 DIAGNOSIS — N18.4 STAGE 4 CHRONIC KIDNEY DISEASE (HCC): ICD-10-CM

## 2023-07-10 DIAGNOSIS — Z85.038 HISTORY OF COLON CANCER: ICD-10-CM

## 2023-07-10 DIAGNOSIS — G31.9 CEREBRAL ATROPHY (HCC): ICD-10-CM

## 2023-07-10 DIAGNOSIS — E66.9 CLASS 1 OBESITY WITH SERIOUS COMORBIDITY AND BODY MASS INDEX (BMI) OF 30.0 TO 30.9 IN ADULT, UNSPECIFIED OBESITY TYPE: ICD-10-CM

## 2023-07-10 DIAGNOSIS — M25.511 CHRONIC RIGHT SHOULDER PAIN: ICD-10-CM

## 2023-07-10 DIAGNOSIS — I50.22 CHRONIC SYSTOLIC HEART FAILURE (HCC): ICD-10-CM

## 2023-07-10 DIAGNOSIS — I50.32 CHRONIC DIASTOLIC CHF (CONGESTIVE HEART FAILURE) (HCC): ICD-10-CM

## 2023-07-10 DIAGNOSIS — N52.9 IMPOTENCE OF ORGANIC ORIGIN: ICD-10-CM

## 2023-07-10 DIAGNOSIS — I35.0 AORTIC VALVE STENOSIS, ETIOLOGY OF CARDIAC VALVE DISEASE UNSPECIFIED: ICD-10-CM

## 2023-07-10 PROBLEM — I25.10 CORONARY ARTERY DISEASE INVOLVING NATIVE CORONARY ARTERY OF NATIVE HEART WITHOUT ANGINA PECTORIS: Status: ACTIVE | Noted: 2023-07-10

## 2023-07-10 PROBLEM — E11.36 TYPE 2 DIABETES MELLITUS WITH DIABETIC CATARACT, WITHOUT LONG-TERM CURRENT USE OF INSULIN (HCC): Status: RESOLVED | Noted: 2022-03-09 | Resolved: 2023-07-10

## 2023-07-10 PROBLEM — R73.9 HYPERGLYCEMIA: Status: RESOLVED | Noted: 2023-03-25 | Resolved: 2023-07-10

## 2023-07-10 PROBLEM — R41.89 ALTERATION IN COGNITION: Status: RESOLVED | Noted: 2021-11-04 | Resolved: 2023-07-10

## 2023-07-10 PROBLEM — S12.100A CLOSED ODONTOID FRACTURE, INITIAL ENCOUNTER (HCC): Status: RESOLVED | Noted: 2021-11-04 | Resolved: 2023-07-10

## 2023-07-10 PROBLEM — N39.0 URINARY TRACT INFECTION WITHOUT HEMATURIA: Status: RESOLVED | Noted: 2021-09-15 | Resolved: 2023-07-10

## 2023-07-10 PROBLEM — E87.1 HYPONATREMIA: Status: RESOLVED | Noted: 2023-03-25 | Resolved: 2023-07-10

## 2023-07-10 PROBLEM — E87.6 HYPOKALEMIA: Status: RESOLVED | Noted: 2023-03-25 | Resolved: 2023-07-10

## 2023-07-10 PROBLEM — F05 DELIRIUM DUE TO MEDICAL CONDITION WITH BEHAVIORAL DISTURBANCE: Status: RESOLVED | Noted: 2021-11-04 | Resolved: 2023-07-10

## 2023-07-10 PROBLEM — I50.9 ACUTE ON CHRONIC CONGESTIVE HEART FAILURE, UNSPECIFIED HEART FAILURE TYPE (HCC): Status: RESOLVED | Noted: 2023-03-01 | Resolved: 2023-07-10

## 2023-07-10 LAB
CREAT UR-SCNC: 54.1 MG/DL
EST. AVERAGE GLUCOSE BLD GHB EST-MCNC: 94 MG/DL (ref 68–126)
HBA1C MFR BLD: 4.9 % (ref ?–5.7)
MICROALBUMIN UR-MCNC: 9.42 MG/DL
MICROALBUMIN/CREAT 24H UR-RTO: 174.1 UG/MG (ref ?–30)

## 2023-07-10 PROCEDURE — 83036 HEMOGLOBIN GLYCOSYLATED A1C: CPT

## 2023-07-10 PROCEDURE — 82043 UR ALBUMIN QUANTITATIVE: CPT

## 2023-07-10 PROCEDURE — 36415 COLL VENOUS BLD VENIPUNCTURE: CPT

## 2023-07-10 PROCEDURE — 82570 ASSAY OF URINE CREATININE: CPT

## 2023-07-10 RX ORDER — CARVEDILOL 3.12 MG/1
3.12 TABLET ORAL 2 TIMES DAILY WITH MEALS
COMMUNITY

## 2023-07-11 ENCOUNTER — HOSPITAL ENCOUNTER (OUTPATIENT)
Dept: CARDIOLOGY CLINIC | Facility: HOSPITAL | Age: 78
Discharge: HOME | End: 2023-07-11
Attending: INTERNAL MEDICINE
Payer: MEDICARE

## 2023-07-11 VITALS
SYSTOLIC BLOOD PRESSURE: 188 MMHG | HEART RATE: 60 BPM | OXYGEN SATURATION: 98 % | DIASTOLIC BLOOD PRESSURE: 74 MMHG | RESPIRATION RATE: 14 BRPM

## 2023-07-11 DIAGNOSIS — Z95.2 S/P TAVR (TRANSCATHETER AORTIC VALVE REPLACEMENT): Primary | ICD-10-CM

## 2023-07-11 DIAGNOSIS — I10 PRIMARY HYPERTENSION: ICD-10-CM

## 2023-07-11 PROBLEM — F10.20 UNCOMPLICATED ALCOHOL DEPENDENCE (HCC): Status: RESOLVED | Noted: 2021-09-21 | Resolved: 2023-07-11

## 2023-07-11 PROCEDURE — 99213 OFFICE O/P EST LOW 20 MIN: CPT | Performed by: NURSE PRACTITIONER

## 2023-07-11 NOTE — PROGRESS NOTES
[facility]  TAVR Clinic Note    Subjective:   Patient presents for 1 month postop TAVR APN visit, accompanied by wife,  in wheelchair. He underwent  TF TAVR with 29 mm ultra Resilia valve on 5/25/2023  d/t severe, symptomatic aortic stenosis. He had some issues after discharge with repetitive speech, emotional lability-was seen in immediate care-recommended him to go to the ED, patient signed out AMA. He was evaluated by Dr. Edmund Fry from neurology as outpatient 7/5-referral given for Brain MRI, and psychiatry. He currently denies: CP, SOB, fatigue, dizziness, palpitations, or wound issues at present. He complains that he can hear his heartbeat and a swishing noise in the left side of his head when he is at rest.  He denies headache, or other neuro deficits. His activity has been limited by hip pain, and he has an upcoming appointment with Ortho to evaluate. He has a history of bursitis. Review of Systems   Constitutional: Negative. HENT: Negative. Eyes: Negative. Cardiovascular: Negative. Respiratory: Negative. Endocrine: Negative. Skin: Negative. Musculoskeletal:  Positive for joint pain. Gastrointestinal: Negative. Genitourinary: Negative. Neurological: Negative. Psychiatric/Behavioral:  Positive for memory loss. Objective:   BP (!) 188/74 (BP Location: Left arm)   Pulse 60   Resp 14   SpO2 98%   Telemetry-NSR    Physical Exam:   General: AAO, NAD  HEENT: PERRL, MMM  Neck: No JVD  Cardiac: RRR, S1, S2, no murmur or rub noted  Lungs:  CTA bilat  Abdomen: soft, NT, ND, BS +  Extremities: warm, dry, trace-1+ BLE edema  Neurologic: AAO x 3  Skin: groin incision CDI, well healed      Laboratory/Data:  Echo: 5/26/23:  1. Left ventricle: The cavity size was normal. Wall thickness was mildly      increased. Systolic function was normal. The estimated ejection fraction      was 50-55%, by visual assessment.  No diagnostic evidence for regional      wall motion abnormalities. Unable to assess LV diastolic function. 2. Left atrium: The left atrial volume was markedly increased. 3. Right atrium: The atrium was mildly dilated. 4. Aortic valve: Keene MICHELL 29mm S3 Ultra Resilia(TAVR) bioprosthesis      was present. The peak systolic velocity was 7.90B/FLX. The mean systolic      gradient was 5mm Hg. The valve area (VTI) was 2.26cm^2. The valve area      (VTI) index was 1.14cm^2/m^2.   5. Ascending aorta: The ascending aorta was 3.9cm diameter. Impressions: This study is compared with previous dated 03/06/2023: New   bioprosthetic aortic valve appears to be functioning normally. Labs:    Lab Results   Component Value Date    GLU 89 05/26/2023    BUN 43 (H) 05/26/2023    CREATSERUM 1.95 (H) 05/26/2023    BUNCREA 29.0 (H) 09/24/2021    ANIONGAP 5 05/26/2023    GFRAA 53 (L) 09/16/2021    GFRNAA 46 (L) 09/16/2021    CA 8.8 05/26/2023     05/26/2023    K 3.9 05/26/2023    K 3.9 05/26/2023     05/26/2023    CO2 24.0 05/26/2023    OSMOCALC 294 05/26/2023     Lab Results   Component Value Date    WBC 10.4 05/26/2023    RBC 3.04 (L) 05/26/2023    HGB 10.2 (L) 05/26/2023    HCT 30.0 (L) 05/26/2023    MCV 97.0 06/12/2023    MCH 33.6 05/26/2023    MCHC 33.9 06/12/2023    RDW 13.0 05/26/2023    .0 05/26/2023       Lab Results   Component Value Date    INR 1.24 (H) 05/26/2023    INR 1.27 (H) 05/25/2023       Medications:  Current Outpatient Medications   Medication Sig Dispense Refill    carvedilol 3.125 MG Oral Tab Take 1 tablet (3.125 mg total) by mouth 2 (two) times daily with meals. Multiple Vitamin (MULTIVITAMIN ADULT OR) Take by mouth. Simethicone (GAS-X OR)       MELATONIN OR Take by mouth. furosemide 20 MG Oral Tab Take 1 tablet (20 mg total) by mouth daily. hydrALAZINE 50 MG Oral Tab Take 1 tablet (50 mg total) by mouth in the morning and 1 tablet (50 mg total) before bedtime.  60 tablet 3    Polyethylene Glycol 3350 17 g Oral Powd Pack Take 17 g by mouth daily as needed (If no bowel movement in last 24 hours). 10 each 0    Sennosides 17.2 MG Oral Tab Take 1 tablet (17.2 mg total) by mouth nightly as needed (constipation, as needed if no bowel movement that day). 20 tablet 0    aspirin 81 MG Oral Tab EC Take 1 tablet (81 mg total) by mouth daily. 30 tablet 11    atorvastatin 40 MG Oral Tab Take 1 tablet (40 mg total) by mouth nightly. 30 tablet 3    clopidogrel 75 MG Oral Tab Take 1 tablet (75 mg total) by mouth daily. 30 tablet 11    isosorbide dinitrate 5 MG Oral Tab Take 1 tablet (5 mg total) by mouth 3x Daily Cardiac. 90 tablet 3       Assessment:  s/p RTF 29mm Ultra TAVR d/t severe, symptomatic aortic stenosis--5/25/23  Chronic HFrEF, EF 35% -with improvement in LVEF to 59% post TAVR   CAD hx multiple PCI/ALEKSANDER, most recent ALEKSANDER x4 to left Cx, ALEKSANDER x2 LAD  3/8/23  HTN-elevated today, per wife's report has been stable  HL-statin  DMII  CA s/p hemicolectomy  CKD--baseline CRT approximately 1.5 -follows with nephrology  Memory loss/repetitive speech/emotional lability/anger-following with neurology Dr. Radha Parker, referred to psychiatry-MRI pending  Hip pain    Plan:    Complete TVT KCCQ -results reviewed   Echo today   Increase acitivity as tolerated -follow-up with Ortho as needed for bilateral hip pain, PT?   F/U with primary cardiologist as directed -Dr. Diallo Schmid, advised to monitor Home BP readings - if consistently >150 to notify Dr. Diallo Schmid office   F/U in TAVR clinic in 5months with echo, or sooner if clinically indicated   Reinforced he should go to ED immediately for any new or worsening neuro symptoms -MRI/psych referral per neurology    I have spent 20 minutes with this patient with > 50% of my time spent in education, coordination, and counseling related to their care.  We specifically discussed low NA, heart healthy diet, importance of continued daily exercise, SBE antibiotic prophylaxis with surgical/dental procedures, compliance with medications, and f/u with specialists as directed.     Hollie Higginbotham, APRN  7/11/2023   [id number]

## 2023-07-24 ENCOUNTER — HOSPITAL ENCOUNTER (OUTPATIENT)
Dept: GENERAL RADIOLOGY | Age: 78
Discharge: HOME | End: 2023-07-24
Attending: PHYSICIAN ASSISTANT
Payer: MEDICARE

## 2023-07-24 ENCOUNTER — OFFICE VISIT (OUTPATIENT)
Dept: NEPHROLOGY | Facility: CLINIC | Age: 78
End: 2023-07-24
Payer: MEDICARE

## 2023-07-24 ENCOUNTER — OFFICE VISIT (OUTPATIENT)
Dept: ORTHOPEDICS CLINIC | Facility: CLINIC | Age: 78
End: 2023-07-24
Payer: MEDICARE

## 2023-07-24 VITALS — BODY MASS INDEX: 30.31 KG/M2 | WEIGHT: 200 LBS | HEIGHT: 68 IN

## 2023-07-24 VITALS — WEIGHT: 194 LBS | DIASTOLIC BLOOD PRESSURE: 70 MMHG | BODY MASS INDEX: 30 KG/M2 | SYSTOLIC BLOOD PRESSURE: 154 MMHG

## 2023-07-24 DIAGNOSIS — M25.551 BILATERAL HIP PAIN: ICD-10-CM

## 2023-07-24 DIAGNOSIS — M70.62 GREATER TROCHANTERIC BURSITIS OF BOTH HIPS: Primary | ICD-10-CM

## 2023-07-24 DIAGNOSIS — M70.61 GREATER TROCHANTERIC BURSITIS OF BOTH HIPS: Primary | ICD-10-CM

## 2023-07-24 DIAGNOSIS — N18.30 STAGE 3 CHRONIC KIDNEY DISEASE, UNSPECIFIED WHETHER STAGE 3A OR 3B CKD (HCC): ICD-10-CM

## 2023-07-24 DIAGNOSIS — M25.552 BILATERAL HIP PAIN: ICD-10-CM

## 2023-07-24 DIAGNOSIS — M76.32 ILIOTIBIAL BAND SYNDROME OF BOTH SIDES: ICD-10-CM

## 2023-07-24 DIAGNOSIS — N17.9 AKI (ACUTE KIDNEY INJURY) (HCC): Primary | ICD-10-CM

## 2023-07-24 DIAGNOSIS — M76.31 ILIOTIBIAL BAND SYNDROME OF BOTH SIDES: ICD-10-CM

## 2023-07-24 DIAGNOSIS — I10 ESSENTIAL HYPERTENSION: ICD-10-CM

## 2023-07-24 PROCEDURE — 3008F BODY MASS INDEX DOCD: CPT | Performed by: PHYSICIAN ASSISTANT

## 2023-07-24 PROCEDURE — 99204 OFFICE O/P NEW MOD 45 MIN: CPT | Performed by: PHYSICIAN ASSISTANT

## 2023-07-24 PROCEDURE — 1159F MED LIST DOCD IN RCRD: CPT | Performed by: PHYSICIAN ASSISTANT

## 2023-07-24 PROCEDURE — 1125F AMNT PAIN NOTED PAIN PRSNT: CPT | Performed by: PHYSICIAN ASSISTANT

## 2023-07-24 PROCEDURE — 73523 X-RAY EXAM HIPS BI 5/> VIEWS: CPT | Performed by: PHYSICIAN ASSISTANT

## 2023-07-24 RX ORDER — METHYLPREDNISOLONE 4 MG/1
TABLET ORAL
Qty: 21 EACH | Refills: 0 | Status: SHIPPED | OUTPATIENT
Start: 2023-07-24

## 2023-07-25 ENCOUNTER — TELEPHONE (OUTPATIENT)
Dept: PHYSICAL THERAPY | Facility: HOSPITAL | Age: 78
End: 2023-07-25

## 2023-07-25 ENCOUNTER — LAB ENCOUNTER (OUTPATIENT)
Dept: LAB | Facility: HOSPITAL | Age: 78
End: 2023-07-25
Attending: INTERNAL MEDICINE
Payer: MEDICARE

## 2023-07-25 DIAGNOSIS — N18.30 CHRONIC KIDNEY DISEASE, STAGE III (MODERATE) (HCC): ICD-10-CM

## 2023-07-25 DIAGNOSIS — N17.9 ACUTE KIDNEY FAILURE, UNSPECIFIED (HCC): ICD-10-CM

## 2023-07-25 DIAGNOSIS — I10 ESSENTIAL HYPERTENSION, MALIGNANT: ICD-10-CM

## 2023-07-25 DIAGNOSIS — N17.9 ACUTE KIDNEY FAILURE, UNSPECIFIED (HCC): Primary | ICD-10-CM

## 2023-07-25 DIAGNOSIS — N18.30 STAGE 3 CHRONIC KIDNEY DISEASE, UNSPECIFIED WHETHER STAGE 3A OR 3B CKD (HCC): ICD-10-CM

## 2023-07-25 DIAGNOSIS — N17.9 AKI (ACUTE KIDNEY INJURY) (HCC): ICD-10-CM

## 2023-07-25 DIAGNOSIS — I10 ESSENTIAL HYPERTENSION: ICD-10-CM

## 2023-07-25 LAB
ALBUMIN SERPL-MCNC: 4 G/DL (ref 3.4–5)
ALBUMIN/GLOB SERPL: 0.9 {RATIO} (ref 1–2)
ALP LIVER SERPL-CCNC: 58 U/L
ALT SERPL-CCNC: 22 U/L
ANION GAP SERPL CALC-SCNC: 6 MMOL/L (ref 0–18)
AST SERPL-CCNC: 21 U/L (ref 15–37)
BILIRUB SERPL-MCNC: 0.4 MG/DL (ref 0.1–2)
BILIRUB UR QL STRIP.AUTO: NEGATIVE
BUN BLD-MCNC: 51 MG/DL (ref 7–18)
CALCIUM BLD-MCNC: 9.7 MG/DL (ref 8.5–10.1)
CHLORIDE SERPL-SCNC: 107 MMOL/L (ref 98–112)
CLARITY UR REFRACT.AUTO: CLEAR
CO2 SERPL-SCNC: 23 MMOL/L (ref 21–32)
COLOR UR AUTO: YELLOW
CREAT BLD-MCNC: 1.91 MG/DL
CREAT UR-SCNC: 70 MG/DL
EGFRCR SERPLBLD CKD-EPI 2021: 35 ML/MIN/1.73M2 (ref 60–?)
FASTING STATUS PATIENT QL REPORTED: NO
GLOBULIN PLAS-MCNC: 4.3 G/DL (ref 2.8–4.4)
GLUCOSE BLD-MCNC: 122 MG/DL (ref 70–99)
GLUCOSE UR STRIP.AUTO-MCNC: NEGATIVE MG/DL
HYALINE CASTS #/AREA URNS AUTO: PRESENT /LPF
KETONES UR STRIP.AUTO-MCNC: NEGATIVE MG/DL
LEUKOCYTE ESTERASE UR QL STRIP.AUTO: NEGATIVE
MICROALBUMIN UR-MCNC: 13.8 MG/DL
MICROALBUMIN/CREAT 24H UR-RTO: 197.1 UG/MG (ref ?–30)
NITRITE UR QL STRIP.AUTO: NEGATIVE
NT-PROBNP SERPL-MCNC: 6237 PG/ML (ref ?–450)
OSMOLALITY SERPL CALC.SUM OF ELEC: 297 MOSM/KG (ref 275–295)
PH UR STRIP.AUTO: 5 [PH] (ref 5–8)
POTASSIUM SERPL-SCNC: 4.7 MMOL/L (ref 3.5–5.1)
PROT SERPL-MCNC: 8.3 G/DL (ref 6.4–8.2)
PROT UR STRIP.AUTO-MCNC: 30 MG/DL
RBC UR QL AUTO: NEGATIVE
SODIUM SERPL-SCNC: 136 MMOL/L (ref 136–145)
SP GR UR STRIP.AUTO: 1.01 (ref 1–1.03)
UROBILINOGEN UR STRIP.AUTO-MCNC: <2 MG/DL

## 2023-07-25 PROCEDURE — 81001 URINALYSIS AUTO W/SCOPE: CPT

## 2023-07-25 PROCEDURE — 36415 COLL VENOUS BLD VENIPUNCTURE: CPT

## 2023-07-25 PROCEDURE — 82043 UR ALBUMIN QUANTITATIVE: CPT

## 2023-07-25 PROCEDURE — 80053 COMPREHEN METABOLIC PANEL: CPT

## 2023-07-25 PROCEDURE — 83880 ASSAY OF NATRIURETIC PEPTIDE: CPT

## 2023-07-25 PROCEDURE — 82570 ASSAY OF URINE CREATININE: CPT

## 2023-07-31 DIAGNOSIS — M70.61 GREATER TROCHANTERIC BURSITIS OF BOTH HIPS: ICD-10-CM

## 2023-07-31 DIAGNOSIS — M70.62 GREATER TROCHANTERIC BURSITIS OF BOTH HIPS: ICD-10-CM

## 2023-07-31 DIAGNOSIS — M76.32 ILIOTIBIAL BAND SYNDROME OF BOTH SIDES: ICD-10-CM

## 2023-07-31 DIAGNOSIS — M76.31 ILIOTIBIAL BAND SYNDROME OF BOTH SIDES: ICD-10-CM

## 2023-07-31 RX ORDER — METHYLPREDNISOLONE 4 MG/1
TABLET ORAL
Qty: 21 TABLET | Refills: 0 | OUTPATIENT
Start: 2023-07-31

## 2023-08-04 ENCOUNTER — TELEPHONE (OUTPATIENT)
Dept: PHYSICAL THERAPY | Facility: HOSPITAL | Age: 78
End: 2023-08-04

## 2023-08-09 ENCOUNTER — APPOINTMENT (OUTPATIENT)
Dept: PHYSICAL THERAPY | Age: 78
End: 2023-08-09
Attending: PHYSICIAN ASSISTANT
Payer: MEDICARE

## 2023-08-16 ENCOUNTER — OFFICE VISIT (OUTPATIENT)
Dept: PHYSICAL THERAPY | Age: 78
End: 2023-08-16
Attending: PHYSICIAN ASSISTANT
Payer: MEDICARE

## 2023-08-16 DIAGNOSIS — M70.61 GREATER TROCHANTERIC BURSITIS OF BOTH HIPS: Primary | ICD-10-CM

## 2023-08-16 DIAGNOSIS — M76.31 ILIOTIBIAL BAND SYNDROME OF BOTH SIDES: ICD-10-CM

## 2023-08-16 DIAGNOSIS — M70.62 GREATER TROCHANTERIC BURSITIS OF BOTH HIPS: Primary | ICD-10-CM

## 2023-08-16 DIAGNOSIS — M76.32 ILIOTIBIAL BAND SYNDROME OF BOTH SIDES: ICD-10-CM

## 2023-08-16 PROCEDURE — 97140 MANUAL THERAPY 1/> REGIONS: CPT

## 2023-08-16 PROCEDURE — 97110 THERAPEUTIC EXERCISES: CPT

## 2023-08-16 PROCEDURE — 97161 PT EVAL LOW COMPLEX 20 MIN: CPT

## 2023-08-17 ENCOUNTER — NURSE ONLY (OUTPATIENT)
Dept: ELECTROPHYSIOLOGY | Facility: HOSPITAL | Age: 78
End: 2023-08-17
Attending: Other
Payer: MEDICARE

## 2023-08-17 DIAGNOSIS — G93.40 ACUTE ENCEPHALOPATHY: ICD-10-CM

## 2023-08-17 PROCEDURE — 95816 EEG AWAKE AND DROWSY: CPT

## 2023-08-17 NOTE — PROCEDURES
Date of Procedure: 8/17/2023    Procedure: EEG (ELECTROENCEPHALOGRAM)     HISTORY: A 65YEAR OLD MALE PRESENTS WITH COMPLAINTS OF CONFUSION, MEMORY LOSS AND MOOD CHANGES. WIFE STATED THAT PATIENT GETS CONFUSED, DELIRIUM AND ANGER ISSUES. SYMPTOMS TYPICALLY ARISE WHEN PATIENT HAD A UTI. PATIENT DENIES ISSUES. PATIENT ALSO HAS SOME BALANCE/GAIT ISSUES BUT DOES NOT CONSISTENTLY USE CANE OR WALKER. PAST MEDICAL HISTORY: AORTIC STENOSIS, CANCER, CHF, DIABETES, HYPERTENSION, ODONTOID FRACTURE WITH ROUTINE HEALING  MEDICATIONS: TIZANIDINE, MEDROL, CARVEDILOL, MELATONIN, LASIX, HYDRALAZINE, CLOPIDOGREL, ISOSORBIDE DINITRATE, ATORVASTATIN  PATIENT STATE: ALERT AND ORIENTED X4  PATIENT STATE DURING EEG: AWAKE, DROWSY    BACKGROUND ACTIVITY: Posterior rhythm was in the range of 6-7 Hz, reactive to eye opening; symmetrical and synchronous. Noted also are generalized slowing. EPILEPTIFORM DISCHARGES: There were no epileptiform discharges or periodic lateralized epileptiform discharges (PLEDs) recorded throughout the recording. HYPERVENTILATION: Hyperventilation was not performed. PHOTIC STIMULATION: Photic stimulation was not performed. Stage II sleep was not reached. IMPRESSION: There were no electroclinical seizure or epileptiform discharges captured. Generalized slowing is noted; Clinical correlation is advised.     Bassam Velasquez MD   Neurology  [facility]  8/17/2023, 2:46 PM  CC: Kashif Rosenberg MD

## 2023-08-18 ENCOUNTER — OFFICE VISIT (OUTPATIENT)
Dept: PHYSICAL THERAPY | Age: 78
End: 2023-08-18
Attending: PHYSICIAN ASSISTANT
Payer: MEDICARE

## 2023-08-18 PROCEDURE — 97110 THERAPEUTIC EXERCISES: CPT

## 2023-08-18 PROCEDURE — 97140 MANUAL THERAPY 1/> REGIONS: CPT

## 2023-08-19 NOTE — PROGRESS NOTES
Diagnosis:   Diagnosis:    Greater trochanteric bursitis of both hips (M70.61,M70.62)  Iliotibial band syndrome of both sides (W96.66,V00.89         Referring Provider: Kamran Conklin  Date of Evaluation:    8/16/23    Precautions:  hoda, 21 years ago colon cancer just had removed no radiation or chemotherapy  Lisinopril allergy  Next MD visit:   none scheduled  Date of Surgery: n/a   Insurance Primary/Secondary: Ese Llamas / N/A     # Auth Visits: 10            Subjective: pt and wife report they have been busy but he was able to get some of his exercises done. He did not get sore after the evaluation. Pain: 0/10      Objective: for treatment and exercises see table below      Assessment: pt did well with exercises including new isometric hip abd to improve pelvic and hip strength and nu step for warm up, as well as new manual therapy added-inferior and lateral glides and LAD B hips Pt needed to be redirected several times to focus on the specific exercise he was to perform as he tends to talk and trail off during conversations.        Goals:   (to be met in 10 visits)  Decrease pain to be 0 to 4/10  Decrease tenderness B greater trochanters, piriformis, Itbands and lateral thighs 30 to 40 percent to allow pt to get in/out of car with minimal pain/difficulty  Increase arom/prom B hips, knees, ankles, and arom trunk 10 deg in all deficit planes to allow pt to bend forward toward the ground, and get in/out of car with minimal pain/difficulty  Increase strength B LE to be 5/5 throughout to allow pt to stand for greater than 30 to 45 min and walk for 15 min and negotiate steps with minimal pain/difficulty  Increase flexibility of B Itbands, piriformis, and hip flexors to allow posture to improve 15 to 20 percent more neutral to improve ambulation to be with minimal deviations using cane for longer distances instead of walker  Pt to improve static standing balance from fair to good and dynamic standing balance from poor to fair to improve ambulation and allow pt to use cane instead of walker for longer distances      Plan: continue with therapy/poc working toward goals stated above. Consider adding some closed chain ex next  Date: 8/19/2023  TX#: 2/10 Date:                 TX#: 3/ Date:                 TX#: 4/ Date:                 TX#: 5/ Date:    Tx#: 6/   Manual therapy 23 min  Foam roller B lateral hips and ITband  inferior/lateral glide and LAD B hips  Prom B hips/hamstrings to end range capsula stretch         There ex 30       Nu step 10 min B UE/LE min resistance   All ex 5 to 10x 5 to 10 sec  Supine B hamstring stretch with strap/towel  Isometric hip add with yellow ball  Isometric hip abd with pilates ring-pt given t-band for home  BKFO B  Sit to stand   Lumbar extension in standing                 HEP: all ex to be performed at home     Charges:   MT x2  There ex x2  Total Timed Treatment: 53 min  Total Treatment Time: 53 min

## 2023-08-21 ENCOUNTER — OFFICE VISIT (OUTPATIENT)
Dept: PHYSICAL THERAPY | Age: 78
End: 2023-08-21
Attending: PHYSICIAN ASSISTANT
Payer: MEDICARE

## 2023-08-21 PROCEDURE — 97110 THERAPEUTIC EXERCISES: CPT

## 2023-08-21 PROCEDURE — 97140 MANUAL THERAPY 1/> REGIONS: CPT

## 2023-08-21 NOTE — PROGRESS NOTES
Diagnosis:   Diagnosis:    Greater trochanteric bursitis of both hips (M70.61,M70.62)  Iliotibial band syndrome of both sides (X19.04,L99.14         Referring Provider: Jan Thurston  Date of Evaluation:    8/16/23    Precautions:  hoda, 21 years ago colon cancer just had removed no radiation or chemotherapy  Lisinopril allergy  Next MD visit:   none scheduled  Date of Surgery: n/a   Insurance Primary/Secondary: [place] / N/A     # Auth Visits: 10            Subjective: pt and wife report that over the weekend they went [place] exercise [place] and he did the nu step machine Saturday and Sunday. He also walked the short distance from his house to the exercise room without the need of an assistive device. He also was able to walk in from the parking lot into the clinic much easier since starting therapy    Pain: 0/10      Objective: for treatment and exercises see table below. 8/21/23-Pt arom/prom B hips and hamstrings have increased 5 to 7 degrees in all planes since starting therapy      Assessment: pt was able to complete 10 min on nustep and most of his exercises; however, he talked continuously about random topics and got confused and upset a couple of times because he could not remember items and had difficulty adding a contact to his phone. Spoke with wife privately that she may need to get a  involved to help her at home since he seems to be confused and is upsetting her. It appears he may have some dementia presenting itself and becoming worse as time passes. No new ex today as it was too difficult to get pt to cooperate with treatment and listen/comply with instructions.        Goals:   (to be met in 10 visits)  Decrease pain to be 0 to 4/10  Decrease tenderness B greater trochanters, piriformis, Itbands and lateral thighs 30 to 40 percent to allow pt to get in/out of car with minimal pain/difficulty  Increase arom/prom B hips, knees, ankles, and arom trunk 10 deg in all deficit planes to allow pt to bend forward toward the ground, and get in/out of car with minimal pain/difficulty  Increase strength B LE to be 5/5 throughout to allow pt to stand for greater than 30 to 45 min and walk for 15 min and negotiate steps with minimal pain/difficulty  Increase flexibility of B Itbands, piriformis, and hip flexors to allow posture to improve 15 to 20 percent more neutral to improve ambulation to be with minimal deviations using cane for longer distances instead of walker  Pt to improve static standing balance from fair to good and dynamic standing balance from poor to fair to improve ambulation and allow pt to use cane instead of walker for longer distances      Plan: continue with therapy/poc working toward goals stated above. Consider adding some closed chain ex next if pt is more cooperative and able to follow instructions  Date: 8/19/2023  TX#: 2/10 Date:   8/21/23              TX#: 3/10 Date:                 TX#: 4/ Date:                 TX#: 5/ Date:    Tx#: 6/   Manual therapy 23 min  Foam roller B lateral hips and ITband  inferior/lateral glide and LAD B hips  Prom B hips/hamstrings to end range capsula stretch   manual therapy 23 min  Foam roller B lateral hips and ITband  inferior/lateral glide with mobilization belt and LAD B hips  Prom B hips/hamstrings to end range capsula stretch      There ex 30       Nu step 10 min B UE/LE min resistance   All ex 5 to 10x 5 to 10 sec  Supine B hamstring stretch with strap/towel  Isometric hip add with yellow ball  Isometric hip abd with pilates ring-pt given t-band for home  BKFO B  Sit to stand   Lumbar extension in standing    Therex 30 min  Spoke with wife about status of pt's status of his mental health and she may need to get professional advice to get help for her at home  nu step 10 min B UE/LE min resistance   All ex 5 to 10x 5 to 10 sec  Supine B hamstring stretch with strap/towel  Isometric hip add with yellow ball  Isometric hip abd with farzana ring-pt given t-band for home  BKFO B  Sit to stand-nt  Lumbar extension in standing -nt             HEP: all ex to be performed at home     Charges:   MT x2  There ex x2  Total Timed Treatment: 53 min  Total Treatment Time: 53 min

## 2023-08-22 ENCOUNTER — APPOINTMENT (OUTPATIENT)
Dept: PHYSICAL THERAPY | Facility: HOSPITAL | Age: 78
End: 2023-08-22
Attending: PHYSICIAN ASSISTANT
Payer: MEDICARE

## 2023-08-23 ENCOUNTER — TELEPHONE (OUTPATIENT)
Dept: NEUROLOGY | Facility: CLINIC | Age: 78
End: 2023-08-23

## 2023-08-23 NOTE — TELEPHONE ENCOUNTER
LMTCB regarding results. EMG not seen however EEG completed on 08/17/2023. IMPRESSION: There were no electroclinical seizure or epileptiform discharges captured. Generalized slowing is noted; Clinical correlation is advised.

## 2023-08-23 NOTE — TELEPHONE ENCOUNTER
Spoke to patient and relayed message. Also advised MRI scheduling is not controlled by this office and would not be able to get patient in sooner. Wife verbalized understanding.

## 2023-08-24 ENCOUNTER — APPOINTMENT (OUTPATIENT)
Dept: PHYSICAL THERAPY | Facility: HOSPITAL | Age: 78
End: 2023-08-24
Attending: PHYSICIAN ASSISTANT
Payer: MEDICARE

## 2023-08-24 ENCOUNTER — APPOINTMENT (OUTPATIENT)
Dept: PHYSICAL THERAPY | Age: 78
End: 2023-08-24
Attending: PHYSICIAN ASSISTANT
Payer: MEDICARE

## 2023-08-25 ENCOUNTER — TELEPHONE (OUTPATIENT)
Dept: FAMILY MEDICINE CLINIC | Facility: CLINIC | Age: 78
End: 2023-08-25

## 2023-08-25 DIAGNOSIS — R82.90 URINE ABNORMALITY: Primary | ICD-10-CM

## 2023-08-25 NOTE — TELEPHONE ENCOUNTER
Pt LOV 7/10/23 for annual wellness. Wife asking if Dr. Maricel Danielle will order urine testing as he has been very emotional, confused and difficult. Wife concerned he may have another UTI.   Pls advise

## 2023-08-25 NOTE — TELEPHONE ENCOUNTER
Spoke with patient wife,   She states today pt is singing, forgetful, emotional over the TV show and crying, not himself. Normal EEG. Had hallucinations in the past from UTI. He was keeping her up last night doing abnormal things all night. She states she had to be changed to cipro abt from keflex in the past.   Eating and drinking - patient wife encouraging intake but it could be better. Advised if worsening symptoms, go to Urgent Care or ER. Dr. Linda Whiteside, are you willing to order urine and culture for patient?

## 2023-08-26 ENCOUNTER — LAB ENCOUNTER (OUTPATIENT)
Dept: LAB | Age: 78
End: 2023-08-26
Attending: FAMILY MEDICINE
Payer: MEDICARE

## 2023-08-26 DIAGNOSIS — I42.9 CARDIOMYOPATHY (HCC): ICD-10-CM

## 2023-08-26 DIAGNOSIS — I35.0 AORTIC STENOSIS: ICD-10-CM

## 2023-08-26 DIAGNOSIS — R82.90 URINE ABNORMALITY: ICD-10-CM

## 2023-08-26 DIAGNOSIS — G31.9 CEREBRAL ATROPHY (HCC): ICD-10-CM

## 2023-08-26 DIAGNOSIS — R41.89 ALTERATION IN COGNITION: ICD-10-CM

## 2023-08-26 DIAGNOSIS — I10 HTN (HYPERTENSION): ICD-10-CM

## 2023-08-26 DIAGNOSIS — E11.9 DM (DIABETES MELLITUS) (HCC): ICD-10-CM

## 2023-08-26 DIAGNOSIS — I50.9 ACUTE ON CHRONIC CONGESTIVE HEART FAILURE, UNSPECIFIED HEART FAILURE TYPE (HCC): ICD-10-CM

## 2023-08-26 DIAGNOSIS — I50.9 CHF (CONGESTIVE HEART FAILURE) (HCC): ICD-10-CM

## 2023-08-26 DIAGNOSIS — C18.9 COLON CANCER (HCC): ICD-10-CM

## 2023-08-26 LAB
ALBUMIN SERPL-MCNC: 3.8 G/DL (ref 3.4–5)
ALBUMIN/GLOB SERPL: 1 {RATIO} (ref 1–2)
ALP LIVER SERPL-CCNC: 65 U/L
ALT SERPL-CCNC: 21 U/L
ANION GAP SERPL CALC-SCNC: 5 MMOL/L (ref 0–18)
AST SERPL-CCNC: 26 U/L (ref 15–37)
BILIRUB SERPL-MCNC: 0.5 MG/DL (ref 0.1–2)
BILIRUB UR QL STRIP.AUTO: NEGATIVE
BUN BLD-MCNC: 56 MG/DL (ref 7–18)
CALCIUM BLD-MCNC: 9.5 MG/DL (ref 8.5–10.1)
CHLORIDE SERPL-SCNC: 109 MMOL/L (ref 98–112)
CLARITY UR REFRACT.AUTO: CLEAR
CO2 SERPL-SCNC: 26 MMOL/L (ref 21–32)
COLOR UR AUTO: YELLOW
CREAT BLD-MCNC: 1.89 MG/DL
EGFRCR SERPLBLD CKD-EPI 2021: 36 ML/MIN/1.73M2 (ref 60–?)
FASTING STATUS PATIENT QL REPORTED: YES
GLOBULIN PLAS-MCNC: 3.9 G/DL (ref 2.8–4.4)
GLUCOSE BLD-MCNC: 208 MG/DL (ref 70–99)
GLUCOSE UR STRIP.AUTO-MCNC: NORMAL MG/DL
HYALINE CASTS #/AREA URNS AUTO: PRESENT /LPF
KETONES UR STRIP.AUTO-MCNC: NEGATIVE MG/DL
LEUKOCYTE ESTERASE UR QL STRIP.AUTO: NEGATIVE
NITRITE UR QL STRIP.AUTO: NEGATIVE
OSMOLALITY SERPL CALC.SUM OF ELEC: 312 MOSM/KG (ref 275–295)
PH UR STRIP.AUTO: 5.5 [PH] (ref 5–8)
POTASSIUM SERPL-SCNC: 4.1 MMOL/L (ref 3.5–5.1)
PROT SERPL-MCNC: 7.7 G/DL (ref 6.4–8.2)
PROT UR STRIP.AUTO-MCNC: 70 MG/DL
RBC UR QL AUTO: NEGATIVE
SODIUM SERPL-SCNC: 140 MMOL/L (ref 136–145)
SP GR UR STRIP.AUTO: 1.02 (ref 1–1.03)
UROBILINOGEN UR STRIP.AUTO-MCNC: NORMAL MG/DL

## 2023-08-26 PROCEDURE — 81001 URINALYSIS AUTO W/SCOPE: CPT

## 2023-08-26 PROCEDURE — 80053 COMPREHEN METABOLIC PANEL: CPT

## 2023-08-26 PROCEDURE — 87086 URINE CULTURE/COLONY COUNT: CPT

## 2023-08-26 PROCEDURE — 36415 COLL VENOUS BLD VENIPUNCTURE: CPT

## 2023-08-28 ENCOUNTER — TELEPHONE (OUTPATIENT)
Dept: FAMILY MEDICINE CLINIC | Facility: CLINIC | Age: 78
End: 2023-08-28

## 2023-08-30 ENCOUNTER — APPOINTMENT (OUTPATIENT)
Dept: PHYSICAL THERAPY | Facility: HOSPITAL | Age: 78
End: 2023-08-30
Attending: PHYSICIAN ASSISTANT
Payer: MEDICARE

## 2023-08-31 ENCOUNTER — APPOINTMENT (OUTPATIENT)
Dept: PHYSICAL THERAPY | Facility: HOSPITAL | Age: 78
End: 2023-08-31
Attending: PHYSICIAN ASSISTANT
Payer: MEDICARE

## 2023-09-07 ENCOUNTER — APPOINTMENT (OUTPATIENT)
Dept: PHYSICAL THERAPY | Facility: HOSPITAL | Age: 78
End: 2023-09-07
Attending: PHYSICIAN ASSISTANT
Payer: MEDICARE

## 2023-09-11 ENCOUNTER — APPOINTMENT (OUTPATIENT)
Dept: PHYSICAL THERAPY | Facility: HOSPITAL | Age: 78
End: 2023-09-11
Attending: PHYSICIAN ASSISTANT
Payer: MEDICARE

## 2023-09-13 ENCOUNTER — TELEPHONE (OUTPATIENT)
Dept: NEUROLOGY | Facility: CLINIC | Age: 78
End: 2023-09-13

## 2023-09-20 ENCOUNTER — OFFICE VISIT (OUTPATIENT)
Dept: FAMILY MEDICINE CLINIC | Facility: CLINIC | Age: 78
End: 2023-09-20
Payer: MEDICARE

## 2023-09-20 VITALS
BODY MASS INDEX: 27.74 KG/M2 | RESPIRATION RATE: 18 BRPM | OXYGEN SATURATION: 98 % | HEIGHT: 68 IN | HEART RATE: 59 BPM | WEIGHT: 183 LBS | DIASTOLIC BLOOD PRESSURE: 56 MMHG | SYSTOLIC BLOOD PRESSURE: 112 MMHG

## 2023-09-20 DIAGNOSIS — Z09 HOSPITAL DISCHARGE FOLLOW-UP: Primary | ICD-10-CM

## 2023-09-20 DIAGNOSIS — G89.29 CHRONIC RIGHT SHOULDER PAIN: ICD-10-CM

## 2023-09-20 DIAGNOSIS — M25.552 BILATERAL HIP PAIN: ICD-10-CM

## 2023-09-20 DIAGNOSIS — M25.551 BILATERAL HIP PAIN: ICD-10-CM

## 2023-09-20 DIAGNOSIS — M25.511 CHRONIC RIGHT SHOULDER PAIN: ICD-10-CM

## 2023-09-20 DIAGNOSIS — G31.9 NEURODEGENERATIVE COGNITIVE IMPAIRMENT (HCC): ICD-10-CM

## 2023-09-20 PROCEDURE — G0008 ADMIN INFLUENZA VIRUS VAC: HCPCS | Performed by: FAMILY MEDICINE

## 2023-09-20 PROCEDURE — 3074F SYST BP LT 130 MM HG: CPT | Performed by: FAMILY MEDICINE

## 2023-09-20 PROCEDURE — 99214 OFFICE O/P EST MOD 30 MIN: CPT | Performed by: FAMILY MEDICINE

## 2023-09-20 PROCEDURE — 3078F DIAST BP <80 MM HG: CPT | Performed by: FAMILY MEDICINE

## 2023-09-20 PROCEDURE — 1111F DSCHRG MED/CURRENT MED MERGE: CPT | Performed by: FAMILY MEDICINE

## 2023-09-20 PROCEDURE — 3008F BODY MASS INDEX DOCD: CPT | Performed by: FAMILY MEDICINE

## 2023-09-20 PROCEDURE — 90662 IIV NO PRSV INCREASED AG IM: CPT | Performed by: FAMILY MEDICINE

## 2023-09-20 RX ORDER — CARVEDILOL 6.25 MG/1
12.5 TABLET ORAL 2 TIMES DAILY WITH MEALS
COMMUNITY

## 2023-09-20 RX ORDER — OLANZAPINE 5 MG/1
5 TABLET ORAL NIGHTLY
COMMUNITY

## 2023-09-20 RX ORDER — ISOSORBIDE MONONITRATE 30 MG/1
5 TABLET, EXTENDED RELEASE ORAL 3 TIMES DAILY
COMMUNITY

## 2023-09-20 RX ORDER — MEMANTINE HYDROCHLORIDE 5 MG/1
5 TABLET ORAL 2 TIMES DAILY
COMMUNITY

## 2023-09-20 RX ORDER — DIVALPROEX SODIUM 250 MG/1
250 TABLET, DELAYED RELEASE ORAL 3 TIMES DAILY
COMMUNITY

## 2023-09-20 RX ORDER — AMLODIPINE BESYLATE 5 MG/1
5 TABLET ORAL DAILY
COMMUNITY

## 2023-09-25 NOTE — TELEPHONE ENCOUNTER
PT NEED ISOSORBIDE MONONITRATE 30 MG I TABLET PER DAY   SENT TO [facility] [address] IN [facility]    PT HAS ALSO BEEN VERY CONSTIPATED  PT WIFE STATES THAT THE MEMANTINE   CAUSES CONSTIPATION. IT WAS PRESCRIBED BY HIS PSYCHIATRIST     SHOULD PATIENT DISCONTINUE THIS ?

## 2023-09-26 RX ORDER — ISOSORBIDE MONONITRATE 30 MG/1
30 TABLET, EXTENDED RELEASE ORAL DAILY
Qty: 90 TABLET | Refills: 0 | Status: SHIPPED | OUTPATIENT
Start: 2023-09-26

## 2023-09-26 NOTE — TELEPHONE ENCOUNTER
The dose is 30mg oral tablet Extended Release  1 tablet orally every morning. Please contact wife with questions or when RX is sent.

## 2023-09-26 NOTE — TELEPHONE ENCOUNTER
01/19/22 3:53 PM     See documentation in the [employer]       Polina Dennis LM to return call    Please confirm pt's Isosorbide mononitrate dose/directions

## 2023-10-02 ENCOUNTER — TELEPHONE (OUTPATIENT)
Dept: FAMILY MEDICINE CLINIC | Facility: CLINIC | Age: 78
End: 2023-10-02

## 2023-10-04 ENCOUNTER — TELEPHONE (OUTPATIENT)
Dept: ORTHOPEDICS CLINIC | Facility: CLINIC | Age: 78
End: 2023-10-04

## 2023-10-04 DIAGNOSIS — M25.511 RIGHT SHOULDER PAIN, UNSPECIFIED CHRONICITY: Primary | ICD-10-CM

## 2023-10-04 NOTE — TELEPHONE ENCOUNTER
Patient is scheduled with Dr. Regine Bauer for right shoulder pain. Please advise if imaging is needed.

## 2023-10-11 ENCOUNTER — OFFICE VISIT (OUTPATIENT)
Dept: ORTHOPEDICS CLINIC | Facility: CLINIC | Age: 78
End: 2023-10-11
Payer: MEDICARE

## 2023-10-11 ENCOUNTER — HOSPITAL ENCOUNTER (OUTPATIENT)
Dept: GENERAL RADIOLOGY | Age: 78
Discharge: HOME | End: 2023-10-11
Attending: FAMILY MEDICINE
Payer: MEDICARE

## 2023-10-11 VITALS — BODY MASS INDEX: 27.74 KG/M2 | WEIGHT: 183 LBS | HEIGHT: 68 IN

## 2023-10-11 DIAGNOSIS — M12.811 ROTATOR CUFF ARTHROPATHY, RIGHT: Primary | ICD-10-CM

## 2023-10-11 DIAGNOSIS — M25.511 RIGHT SHOULDER PAIN, UNSPECIFIED CHRONICITY: ICD-10-CM

## 2023-10-11 PROCEDURE — 73030 X-RAY EXAM OF SHOULDER: CPT | Performed by: FAMILY MEDICINE

## 2023-10-11 PROCEDURE — 3008F BODY MASS INDEX DOCD: CPT | Performed by: FAMILY MEDICINE

## 2023-10-11 PROCEDURE — 1159F MED LIST DOCD IN RCRD: CPT | Performed by: FAMILY MEDICINE

## 2023-10-11 PROCEDURE — 1125F AMNT PAIN NOTED PAIN PRSNT: CPT | Performed by: FAMILY MEDICINE

## 2023-10-11 PROCEDURE — 99204 OFFICE O/P NEW MOD 45 MIN: CPT | Performed by: FAMILY MEDICINE

## 2023-10-11 PROCEDURE — 1160F RVW MEDS BY RX/DR IN RCRD: CPT | Performed by: FAMILY MEDICINE

## 2023-10-11 RX ORDER — LIDOCAINE 4 G/G
1 PATCH TOPICAL EVERY 24 HOURS
Qty: 15 PATCH | Refills: 1 | Status: SHIPPED | OUTPATIENT
Start: 2023-10-11

## 2023-10-25 ENCOUNTER — TELEPHONE (OUTPATIENT)
Dept: ORTHOPEDICS CLINIC | Facility: CLINIC | Age: 78
End: 2023-10-25

## 2023-10-25 DIAGNOSIS — M25.551 BILATERAL HIP PAIN: Primary | ICD-10-CM

## 2023-10-25 DIAGNOSIS — M25.552 BILATERAL HIP PAIN: Primary | ICD-10-CM

## 2023-10-25 NOTE — TELEPHONE ENCOUNTER
LOV: 07/24/23    Pain   Location: bilateral hip pain, Buttocks down to bilateral knees  Deep ache, Sore with shooting pain with walking    Current treatment:  Tylenol: 650 mg 1 time daily (education given on dosing and taking it more than 1 time a day, daily max 3000 mg/day)   Physical therapy    Recommendation:  Rest injured part as much as possible for 48 hours  Elevate leg on pillow for the first 24 hours  Apply ice pack to injured area for 20 -30 mins every 3-4 hours for the first 24-48 hours, then alternate with heat. Do not apply ice directly on the skin; use a washcloth or other cloth barrier between the ice and the skin. Take your usual pain medication, (acetaminophen) for discomfort and follow the instructions on the label. Avoid Tylenol if liver disease is present. Avoid activity that causes pain  Avoid prolonged standing or walking    Appointment made for bilateral hips  Future Appointments   Date Time Provider Jose David oBlden   11/6/2023  2:30 PM Aimee Ying PA-C [facility]   11/21/2023  9:15 AM Apn, Structural Heart [address] [facility]   1/17/2024  1:00 PM May Mesa MD [facility] [address]   1/24/2024  1:00 PM Chelita Zelaya MD [facility] [place]   3/6/2024  1:00 PM Miguel A Redmond MD [facility] [place]     - Patient will try recommendations and call the office back if they have no relief.   - Phone number given for physical therapy at [facility] to schedule PT for shoulder (Dr. Yeny Hollis)

## 2023-10-30 ENCOUNTER — TELEPHONE (OUTPATIENT)
Dept: FAMILY MEDICINE CLINIC | Facility: CLINIC | Age: 78
End: 2023-10-30

## 2023-11-01 ENCOUNTER — TELEMEDICINE (OUTPATIENT)
Dept: FAMILY MEDICINE CLINIC | Facility: CLINIC | Age: 78
End: 2023-11-01
Payer: MEDICARE

## 2023-11-01 DIAGNOSIS — R68.89 COLD FEELING: Primary | ICD-10-CM

## 2023-11-01 DIAGNOSIS — M25.552 BILATERAL HIP PAIN: ICD-10-CM

## 2023-11-01 DIAGNOSIS — M25.551 BILATERAL HIP PAIN: ICD-10-CM

## 2023-11-01 PROCEDURE — 1160F RVW MEDS BY RX/DR IN RCRD: CPT | Performed by: FAMILY MEDICINE

## 2023-11-01 PROCEDURE — 1159F MED LIST DOCD IN RCRD: CPT | Performed by: FAMILY MEDICINE

## 2023-11-01 PROCEDURE — 99213 OFFICE O/P EST LOW 20 MIN: CPT | Performed by: FAMILY MEDICINE

## 2023-11-01 RX ORDER — METHYLPREDNISOLONE 4 MG/1
TABLET ORAL
Qty: 21 TABLET | Refills: 0 | Status: SHIPPED | OUTPATIENT
Start: 2023-11-01

## 2023-11-01 NOTE — PROGRESS NOTES
This visit is conducted using Telemedicine with live, interactive video and audio. Patient has been referred to the [facility] website at [email or web address] to review the yearly Consent to Treat document. Patient understands and accepts financial responsibility for any deductible, co-insurance and/or co-pays associated with this service. [facility] Progress Note    SUBJECTIVE: Calvin Toledo 77year old male is here today for No chief complaint on file. Saige Saucedo is calling in today. He is feeling cold all of the time. Feeling cold all of the time. Will feel pain with walking between hips and knees and side muscles. Will use two blankets when sitting, will wear gloves, and have fire place going but still cold    When he had prednisone was only time not in pain. Reports this has been an issue, sensation of cold since April or May. Hands and feet will feel cold to other people, particularly when out and about    PMH  Past Medical History:   Diagnosis Date    Aortic stenosis     Back problem     Cancer ([facility] [address]     CHF (congestive heart failure) ([facility] [address]     Diabetes ([facility] [address]     Essential hypertension     Hearing impairment     High blood pressure     High cholesterol     Odontoid fracture with routine healing     Visual impairment         PSH  Past Surgical History:   Procedure Laterality Date    COLECTOMY      part of colon taken        Social Hx:  No changes    ROS  See HPI    OBJECTIVE:  There were no vitals taken for this visit. Exam      Labs:          Meds:   Current Outpatient Medications   Medication Sig Dispense Refill    methylPREDNISolone (MEDROL) 4 MG Oral Tablet Therapy Pack As directed. 21 tablet 0    diclofenac (VOLTAREN) 1 % External Gel Apply 4 g topically every 6 (six) hours as needed. 1 each 2    lidocaine (HM LIDOCAINE PATCH) 4 % External Patch Place 1 patch onto the skin daily.  15 patch 1    isosorbide mononitrate ER 30 MG Oral Tablet 24 Hr Take 1 tablet (30 mg total) by mouth daily. 90 tablet 0    amLODIPine 5 MG Oral Tab Take 1 tablet (5 mg total) by mouth daily. carvedilol 6.25 MG Oral Tab Take 2 tablets (12.5 mg total) by mouth 2 (two) times daily with meals. divalproex  MG Oral Tab EC DR tab Take 1 tablet (250 mg total) by mouth 3 (three) times daily. memantine 5 MG Oral Tab Take 1 tablet (5 mg total) by mouth 2 (two) times daily. OLANZapine 5 MG Oral Tab Take 1 tablet (5 mg total) by mouth nightly. hydrALAZINE 50 MG Oral Tab Take 1 tablet (50 mg total) by mouth in the morning and 1 tablet (50 mg total) before bedtime. 60 tablet 3    Sennosides 17.2 MG Oral Tab Take 1 tablet (17.2 mg total) by mouth nightly as needed (constipation, as needed if no bowel movement that day). 20 tablet 0    aspirin 81 MG Oral Tab EC Take 1 tablet (81 mg total) by mouth daily. 30 tablet 11    atorvastatin 40 MG Oral Tab Take 1 tablet (40 mg total) by mouth nightly. 30 tablet 3    clopidogrel 75 MG Oral Tab Take 1 tablet (75 mg total) by mouth daily. 30 tablet 11         Assessment/Plan  Diagnoses and all orders for this visit:    Cold feeling    Bilateral hip pain  -     methylPREDNISolone (MEDROL) 4 MG Oral Tablet Therapy Pack; As directed. Advised on how can help with sensation of cold, recent labs suggests against metabolic causes. Can give short course of steroids as has been 3 months to help with current hip pain, would want to see how long benefit is before repeating or considering it any sort of regular options. Total Time spent with patient and coordinating care:  15 minutes.     Follow up: as needed or 2-3 months      Marc Hernandez MD

## 2023-11-21 ENCOUNTER — HOSPITAL ENCOUNTER (OUTPATIENT)
Dept: CARDIOLOGY CLINIC | Facility: HOSPITAL | Age: 78
Discharge: HOME | End: 2023-11-21
Attending: INTERNAL MEDICINE
Payer: MEDICARE

## 2023-11-21 VITALS — SYSTOLIC BLOOD PRESSURE: 124 MMHG | OXYGEN SATURATION: 98 % | HEART RATE: 56 BPM | DIASTOLIC BLOOD PRESSURE: 58 MMHG

## 2023-11-21 DIAGNOSIS — Z95.2 S/P TAVR (TRANSCATHETER AORTIC VALVE REPLACEMENT): Primary | ICD-10-CM

## 2023-11-21 PROCEDURE — 99213 OFFICE O/P EST LOW 20 MIN: CPT | Performed by: NURSE PRACTITIONER

## 2023-11-21 RX ORDER — SENNA AND DOCUSATE SODIUM 50; 8.6 MG/1; MG/1
1 TABLET, FILM COATED ORAL 2 TIMES DAILY
COMMUNITY

## 2023-11-21 RX ORDER — POLYETHYLENE GLYCOL 3350 17 G/17G
17 POWDER, FOR SOLUTION ORAL DAILY
COMMUNITY

## 2023-11-21 RX ORDER — ENOXAPARIN SODIUM 100 MG/ML
40 INJECTION SUBCUTANEOUS DAILY
COMMUNITY

## 2023-12-27 RX ORDER — ISOSORBIDE MONONITRATE 30 MG/1
30 TABLET, EXTENDED RELEASE ORAL DAILY
Qty: 90 TABLET | Refills: 0 | Status: SHIPPED | OUTPATIENT
Start: 2023-12-27

## 2023-12-27 NOTE — TELEPHONE ENCOUNTER
.A refill request was received for:  Requested Prescriptions     Pending Prescriptions Disp Refills    ISOSORBIDE MONONITRATE ER 30 MG Oral Tablet 24 Hr [Pharmacy Med Name: Isosorbide Mononitrate Er 24hr 30 Mg Tab Radha] 90 tablet 0     Sig: Take 1 tablet by mouth daily.        Last refill date:   9/26/2023    Last office visit: 9/20/2023    Follow up due:  Future Appointments   Date Time Provider Jose David Venturai   1/17/2024  1:00 PM Tulio Velasquez MD [facility] [address]   1/24/2024  1:00 PM Erasto Spain MD [facility] [place]   3/6/2024  1:00 PM Sachi Marin MD [facility] [place]   5/21/2024 11:45 AM Aphans, Structural Heart [address] [facility]

## 2024-01-17 ENCOUNTER — TELEMEDICINE (OUTPATIENT)
Dept: FAMILY MEDICINE CLINIC | Facility: CLINIC | Age: 79
End: 2024-01-17
Payer: MEDICARE

## 2024-01-17 DIAGNOSIS — N18.30 STAGE 3 CHRONIC KIDNEY DISEASE, UNSPECIFIED WHETHER STAGE 3A OR 3B CKD (HCC): Chronic | ICD-10-CM

## 2024-01-17 DIAGNOSIS — E11.69 TYPE 2 DIABETES MELLITUS WITH OTHER SPECIFIED COMPLICATION, WITHOUT LONG-TERM CURRENT USE OF INSULIN (HCC): ICD-10-CM

## 2024-01-17 DIAGNOSIS — Z51.81 MEDICATION MONITORING ENCOUNTER: ICD-10-CM

## 2024-01-17 DIAGNOSIS — I50.22 CHRONIC SYSTOLIC HEART FAILURE (HCC): ICD-10-CM

## 2024-01-17 DIAGNOSIS — R68.89 COLD FEELING: Primary | ICD-10-CM

## 2024-01-17 PROCEDURE — 1160F RVW MEDS BY RX/DR IN RCRD: CPT | Performed by: FAMILY MEDICINE

## 2024-01-17 PROCEDURE — 1159F MED LIST DOCD IN RCRD: CPT | Performed by: FAMILY MEDICINE

## 2024-01-17 PROCEDURE — 99213 OFFICE O/P EST LOW 20 MIN: CPT | Performed by: FAMILY MEDICINE

## 2024-01-17 NOTE — PROGRESS NOTES
.This visit is conducted using Telemedicine with live, interactive video and audio.    Patient has been referred to the [facility] website at [email or web address] to review the yearly Consent to Treat document.    Patient understands and accepts financial responsibility for any deductible, co-insurance and/or co-pays associated with this service.  [facility] Progress Note    SUBJECTIVE: Leon Iniguez 78 year old male is here today for No chief complaint on file.      Leon Iniguez calls in with his wife. Ongoing issue with feeling cold all of the time. Simon wear layers, and thermostant up to 78 and still feeling cold.    Worse in extremities, hands and feet, can even hurt to make a fist.    BP has been low, so haven't been taking amlodipine. BP today 84/44, pulse 55.    Has an appointment with Dr. Lakhani, this month.    Planning on stress test.    Recently spoke with psychiatrist.    Has to move slow,but not needing his walker.    Has been able to play games, scrabble, and is able to be mentally active.  His wife feels his mood is better.       PMH  Past Medical History:   Diagnosis Date    Aortic stenosis     Back problem     Cancer (HCC)     CHF (congestive heart failure) (HCC)     Diabetes (HCC)     Essential hypertension     Hearing impairment     High blood pressure     High cholesterol     Odontoid fracture with routine healing     Visual impairment         PSH  Past Surgical History:   Procedure Laterality Date    COLECTOMY      part of colon taken        Social Hx:  Doing better with mood    ROS  See HPI    OBJECTIVE:  There were no vitals taken for this visit.        Labs:          Meds:   Current Outpatient Medications   Medication Sig Dispense Refill    isosorbide mononitrate ER 30 MG Oral Tablet 24 Hr Take 1 tablet (30 mg total) by mouth daily. 90 tablet 0    senna-docusate 8.6-50 MG Oral Tab Take 1 tablet by mouth 2 (two) times daily.      enoxaparin 40 MG/0.4ML Injection Solution Prefilled Syringe  Inject 0.4 mL (40 mg total) into the skin daily.      Polyethylene Glycol 3350 17 g Oral Powd Pack Take 17 g by mouth daily.      methylPREDNISolone (MEDROL) 4 MG Oral Tablet Therapy Pack As directed. (Patient not taking: Reported on 11/21/2023) 21 tablet 0    diclofenac (VOLTAREN) 1 % External Gel Apply 4 g topically every 6 (six) hours as needed. (Patient not taking: Reported on 11/21/2023) 1 each 2    lidocaine (HM LIDOCAINE PATCH) 4 % External Patch Place 1 patch onto the skin daily. (Patient not taking: Reported on 11/21/2023) 15 patch 1    amLODIPine 5 MG Oral Tab Take 1 tablet (5 mg total) by mouth daily.      carvedilol 6.25 MG Oral Tab Take 2 tablets (12.5 mg total) by mouth 2 (two) times daily with meals.      divalproex  MG Oral Tab EC DR tab Take 1 tablet (250 mg total) by mouth 3 (three) times daily.      memantine 5 MG Oral Tab Take 1 tablet (5 mg total) by mouth 2 (two) times daily.      OLANZapine 5 MG Oral Tab Take 1 tablet (5 mg total) by mouth nightly.      hydrALAZINE 50 MG Oral Tab Take 1 tablet (50 mg total) by mouth in the morning and 1 tablet (50 mg total) before bedtime. 60 tablet 3    Sennosides 17.2 MG Oral Tab Take 1 tablet (17.2 mg total) by mouth nightly as needed (constipation, as needed if no bowel movement that day). (Patient not taking: Reported on 11/21/2023) 20 tablet 0    aspirin 81 MG Oral Tab EC Take 1 tablet (81 mg total) by mouth daily. 30 tablet 11    atorvastatin 40 MG Oral Tab Take 1 tablet (40 mg total) by mouth nightly. 30 tablet 3    clopidogrel 75 MG Oral Tab Take 1 tablet (75 mg total) by mouth daily. 30 tablet 11         Assessment/Plan  Diagnoses and all orders for this visit:    Cold feeling  -     Comp Metabolic Panel (14) [E]; Future  -     CBC W Differential W Platelet [E]; Future  -     TSH W Reflex To Free T4 [E]; Future    Medication monitoring encounter  -     Comp Metabolic Panel (14) [E]; Future  -     CBC W Differential W Platelet [E];  Future    Stage 3 chronic kidney disease, unspecified whether stage 3a or 3b CKD (HCC)    Type 2 diabetes mellitus with other specified complication, without long-term current use of insulin (HCC)  -     Hemoglobin A1C [E]; Future    Chronic systolic heart failure (HCC)    Likely his symptoms are due to his low blood pressure related to control for his heart failure.    Has hx of diabetes but well controlled without medications. Will check kidney function for medications and liver.     I suspect hypotension may play a role, goal of course is to protect his heart, has been holding the amlodipine due to BP as instructed which might help with symptoms    I will have them reduce hydralazine to 25 mg BID, and see if BP comes up and if that helps symptoms     They can call cardiology and get their opinion. But right now I think his symptoms are from the tight BP control, which is important for cardiac care, so I would see if we can target the less essential antihypertensives to reduce symptoms    Total Time spent with patient and coordinating care:  16 minutes.    Follow up: as needed or 2-3 months      Juan Jose Perez MD

## 2024-01-25 ENCOUNTER — LAB ENCOUNTER (OUTPATIENT)
Dept: LAB | Age: 79
End: 2024-01-25
Attending: FAMILY MEDICINE
Payer: MEDICARE

## 2024-01-25 DIAGNOSIS — R68.89 COLD FEELING: ICD-10-CM

## 2024-01-25 DIAGNOSIS — Z51.81 MEDICATION MONITORING ENCOUNTER: ICD-10-CM

## 2024-01-25 DIAGNOSIS — E11.69 TYPE 2 DIABETES MELLITUS WITH OTHER SPECIFIED COMPLICATION, WITHOUT LONG-TERM CURRENT USE OF INSULIN (HCC): ICD-10-CM

## 2024-01-25 LAB
ALBUMIN SERPL-MCNC: 3.7 G/DL (ref 3.4–5)
ALBUMIN/GLOB SERPL: 1.1 {RATIO} (ref 1–2)
ALP LIVER SERPL-CCNC: 60 U/L
ANION GAP SERPL CALC-SCNC: 3 MMOL/L (ref 0–18)
AST SERPL-CCNC: 16 U/L (ref 15–37)
BASOPHILS # BLD AUTO: 0.08 X10(3) UL (ref 0–0.2)
BASOPHILS NFR BLD AUTO: 1 %
BILIRUB SERPL-MCNC: 0.4 MG/DL (ref 0.1–2)
BUN BLD-MCNC: 56 MG/DL (ref 9–23)
CALCIUM BLD-MCNC: 8.8 MG/DL (ref 8.5–10.1)
CHLORIDE SERPL-SCNC: 113 MMOL/L (ref 98–112)
CO2 SERPL-SCNC: 26 MMOL/L (ref 21–32)
CREAT BLD-MCNC: 2.03 MG/DL
EGFRCR SERPLBLD CKD-EPI 2021: 33 ML/MIN/1.73M2 (ref 60–?)
EOSINOPHIL # BLD AUTO: 1.05 X10(3) UL (ref 0–0.7)
EOSINOPHIL NFR BLD AUTO: 13.4 %
ERYTHROCYTE [DISTWIDTH] IN BLOOD BY AUTOMATED COUNT: 12.4 %
EST. AVERAGE GLUCOSE BLD GHB EST-MCNC: 114 MG/DL (ref 68–126)
FASTING STATUS PATIENT QL REPORTED: YES
GLOBULIN PLAS-MCNC: 3.3 G/DL (ref 2.8–4.4)
GLUCOSE BLD-MCNC: 105 MG/DL (ref 70–99)
HBA1C MFR BLD: 5.6 % (ref ?–5.7)
HCT VFR BLD AUTO: 33.9 %
HGB BLD-MCNC: 11.3 G/DL
IMM GRANULOCYTES # BLD AUTO: 0.01 X10(3) UL (ref 0–1)
IMM GRANULOCYTES NFR BLD: 0.1 %
LYMPHOCYTES # BLD AUTO: 2.59 X10(3) UL (ref 1–4)
LYMPHOCYTES NFR BLD AUTO: 33 %
MCH RBC QN AUTO: 32.3 PG (ref 26–34)
MCHC RBC AUTO-ENTMCNC: 33.3 G/DL (ref 31–37)
MCV RBC AUTO: 96.9 FL
MONOCYTES # BLD AUTO: 0.63 X10(3) UL (ref 0.1–1)
MONOCYTES NFR BLD AUTO: 8 %
NEUTROPHILS # BLD AUTO: 3.49 X10 (3) UL (ref 1.5–7.7)
NEUTROPHILS # BLD AUTO: 3.49 X10(3) UL (ref 1.5–7.7)
NEUTROPHILS NFR BLD AUTO: 44.5 %
OSMOLALITY SERPL CALC.SUM OF ELEC: 310 MOSM/KG (ref 275–295)
PLATELET # BLD AUTO: 172 10(3)UL (ref 150–450)
POTASSIUM SERPL-SCNC: 4.5 MMOL/L (ref 3.5–5.1)
PROT SERPL-MCNC: 7 G/DL (ref 6.4–8.2)
RBC # BLD AUTO: 3.5 X10(6)UL
SODIUM SERPL-SCNC: 142 MMOL/L (ref 136–145)
TSI SER-ACNC: 2.43 MIU/ML (ref 0.36–3.74)
WBC # BLD AUTO: 7.9 X10(3) UL (ref 4–11)

## 2024-01-25 PROCEDURE — 36415 COLL VENOUS BLD VENIPUNCTURE: CPT

## 2024-01-25 PROCEDURE — 83036 HEMOGLOBIN GLYCOSYLATED A1C: CPT

## 2024-01-25 PROCEDURE — 85025 COMPLETE CBC W/AUTO DIFF WBC: CPT

## 2024-01-25 PROCEDURE — 80053 COMPREHEN METABOLIC PANEL: CPT

## 2024-01-25 PROCEDURE — 84443 ASSAY THYROID STIM HORMONE: CPT

## 2024-01-31 ENCOUNTER — OFFICE VISIT (OUTPATIENT)
Dept: NEPHROLOGY | Facility: CLINIC | Age: 79
End: 2024-01-31
Payer: MEDICARE

## 2024-01-31 VITALS — BODY MASS INDEX: 29 KG/M2 | DIASTOLIC BLOOD PRESSURE: 60 MMHG | WEIGHT: 192 LBS | SYSTOLIC BLOOD PRESSURE: 142 MMHG

## 2024-01-31 DIAGNOSIS — N18.30 STAGE 3 CHRONIC KIDNEY DISEASE, UNSPECIFIED WHETHER STAGE 3A OR 3B CKD (HCC): Primary | ICD-10-CM

## 2024-01-31 DIAGNOSIS — I10 ESSENTIAL HYPERTENSION: ICD-10-CM

## 2024-01-31 PROCEDURE — 99214 OFFICE O/P EST MOD 30 MIN: CPT | Performed by: INTERNAL MEDICINE

## 2024-01-31 NOTE — PROGRESS NOTES
Nephrology Progress Note      Leon Iniguez is a 78 year old male.    HPI:     Chief Complaint   Patient presents with    Acute Renal Injury    Chronic Kidney Disease    Hypertension       Mr. Iniguez was seen in the nephrology clinic today in follow-up for management of chronic kidney disease stage III-IV in the setting of multiple issues including diabetes hypertension and HFrEF.  Since his last clinic visit he has done very well overall per discussion with he and his wife.  Volume status has been stable and he has not been hospitalized in several months.  Recently his hydralazine dose was decreased due to issues with low blood pressure and since that time he has been feeling quite well.  There are no other medication changes and his review of systems is unremarkable.  His wife does report that his mood has been excellent of late as well      HISTORY:  Past Medical History:   Diagnosis Date    Aortic stenosis     Back problem     Cancer (HCC)     CHF (congestive heart failure) (HCC)     Diabetes (HCC)     Essential hypertension     Hearing impairment     High blood pressure     High cholesterol     Odontoid fracture with routine healing     Visual impairment       Past Surgical History:   Procedure Laterality Date    COLECTOMY      part of colon taken      History reviewed. No pertinent family history.   Social History:   Social History     Socioeconomic History    Marital status:    Tobacco Use    Smoking status: Former     Types: Cigarettes     Quit date: 2/3/1990     Years since quittin.0    Smokeless tobacco: Never   Vaping Use    Vaping Use: Never used   Substance and Sexual Activity    Alcohol use: Yes     Comment: every day for years, LAST DRINK 2023    Drug use: Never   Other Topics Concern    Caffeine Concern No    Exercise No        Medications (Active prior to today's visit):  Current Outpatient Medications   Medication Sig Dispense Refill    isosorbide mononitrate ER 30 MG Oral Tablet 24  Hr Take 1 tablet (30 mg total) by mouth daily. 90 tablet 0    senna-docusate 8.6-50 MG Oral Tab Take 1 tablet by mouth 2 (two) times daily.      enoxaparin 40 MG/0.4ML Injection Solution Prefilled Syringe Inject 0.4 mL (40 mg total) into the skin daily.      Polyethylene Glycol 3350 17 g Oral Powd Pack Take 17 g by mouth daily.      methylPREDNISolone (MEDROL) 4 MG Oral Tablet Therapy Pack As directed. (Patient not taking: Reported on 11/21/2023) 21 tablet 0    diclofenac (VOLTAREN) 1 % External Gel Apply 4 g topically every 6 (six) hours as needed. (Patient not taking: Reported on 11/21/2023) 1 each 2    lidocaine (HM LIDOCAINE PATCH) 4 % External Patch Place 1 patch onto the skin daily. (Patient not taking: Reported on 11/21/2023) 15 patch 1    amLODIPine 5 MG Oral Tab Take 1 tablet (5 mg total) by mouth daily.      carvedilol 6.25 MG Oral Tab Take 2 tablets (12.5 mg total) by mouth 2 (two) times daily with meals.      divalproex  MG Oral Tab EC DR tab Take 1 tablet (250 mg total) by mouth 3 (three) times daily.      memantine 5 MG Oral Tab Take 1 tablet (5 mg total) by mouth 2 (two) times daily.      OLANZapine 5 MG Oral Tab Take 1 tablet (5 mg total) by mouth nightly.      hydrALAZINE 50 MG Oral Tab Take 1 tablet (50 mg total) by mouth in the morning and 1 tablet (50 mg total) before bedtime. 60 tablet 3    Sennosides 17.2 MG Oral Tab Take 1 tablet (17.2 mg total) by mouth nightly as needed (constipation, as needed if no bowel movement that day). (Patient not taking: Reported on 11/21/2023) 20 tablet 0    aspirin 81 MG Oral Tab EC Take 1 tablet (81 mg total) by mouth daily. 30 tablet 11    atorvastatin 40 MG Oral Tab Take 1 tablet (40 mg total) by mouth nightly. 30 tablet 3    clopidogrel 75 MG Oral Tab Take 1 tablet (75 mg total) by mouth daily. 30 tablet 11       Allergies:  Allergies   Allergen Reactions    Lisinopril ANAPHYLAXIS         ROS:     Denies fever/chills  Denies wt loss/gain  Denies HA or  visual changes  Denies CP or palpitations  Denies SOB/cough/hemoptysis  Denies abd or flank pain  Denies N/V/D  Denies change in urinary habits or gross hematuria  Denies LE edema  Denies skin rashes/myalgias/arthralgias      PHYSICAL EXAM:   /60 (BP Location: Left arm, Patient Position: Sitting)   Wt 192 lb (87.1 kg)   BMI 29.19 kg/m²   Wt Readings from Last 6 Encounters:   01/31/24 192 lb (87.1 kg)   10/11/23 183 lb (83 kg)   09/20/23 183 lb (83 kg)   07/24/23 194 lb (88 kg)   07/24/23 200 lb (90.7 kg)   07/10/23 200 lb (90.7 kg)       General: Alert and in no apparent distress.  HEENT: No scleral icterus, MMM  Neck: Supple, no JENN or thyromegaly  Cardiac: Regular rate and rhythm, S1, S2 normal, 2/6 crescendo decrescendo systolic murmur  Lungs: Clear without wheezes, rales, rhonchi.   Extremities: Without clubbing, cyanosis or edema.  Neurologic:  moving all extremities  Skin: Warm and dry, no rashes      LABS:     Lab Results   Component Value Date    BUN 56 (H) 01/25/2024    BUNCREA 29.0 (H) 09/24/2021    CREATSERUM 2.03 (H) 01/25/2024    ANIONGAP 3 01/25/2024    GFR >59 02/03/2010    GFRNAA 46 (L) 09/16/2021    GFRAA 53 (L) 09/16/2021    CA 8.8 01/25/2024    OSMOCALC 310 (H) 01/25/2024    ALKPHO 60 01/25/2024    AST 16 01/25/2024    ALT  01/25/2024      Comment:      Due to  backorder we are temporarily unable to offer hospital-based ALT testing at [facility].   If urgently needed, please order ALT test code [id number].   The new order will need a new venipuncture and will be sent to [facility] for testing.   The expected turnaround time will be within 24 hours.     BILT 0.4 01/25/2024    TP 7.0 01/25/2024    ALB 3.7 01/25/2024    GLOBULIN 3.3 01/25/2024    AGRATIO 0.9 (L) 02/03/2010     01/25/2024    K 4.5 01/25/2024     (H) 01/25/2024    CO2 26.0 01/25/2024     Lab Results   Component Value Date    RBC 3.50 (L) 01/25/2024    HGB 11.3 (L) 01/25/2024    HCT 33.9 (L) 01/25/2024     .0 01/25/2024    MCV 96.9 01/25/2024    MCH 32.3 01/25/2024    MCHC 33.3 01/25/2024    RDW 12.4 01/25/2024    NEPRELIM 3.49 01/25/2024    NEPERCENT 44.5 01/25/2024    LYPERCENT 33.0 01/25/2024    MOPERCENT 8.0 01/25/2024    EOPERCENT 13.4 01/25/2024    BAPERCENT 1.0 01/25/2024    NE 3.49 01/25/2024    LYMABS 2.59 01/25/2024    MOABSO 0.63 01/25/2024    EOABSO 1.05 (H) 01/25/2024    BAABSO 0.08 01/25/2024     Lab Results   Component Value Date    CREUR 70.00 07/25/2023     Lab Results   Component Value Date    CLARITY Clear 08/26/2023    SPECGRAVITY 1.016 08/26/2023    GLUUR Normal 08/26/2023    BILUR Negative 08/26/2023    KETUR Negative 08/26/2023    BLOODURINE Negative 08/26/2023    PHURINE 5.5 08/26/2023    PROUR 70 (A) 08/26/2023    UROBILINOGEN Normal 08/26/2023    NITRITE Negative 08/26/2023    LEUUR Negative 08/26/2023    WBCUR 1-5 08/26/2023    RBCUR 0-2 08/26/2023    EPIUR Few (A) 08/26/2023    BACUR None Seen 08/26/2023    HYLUR Present (A) 08/26/2023     ASSESSMENT/PLAN:     #1.  Chronic kidney disease stage III-IV-renal function has been relatively stable for the past 9 to 10 months or so with creatinine levels generally 1.8 to 2.0 mg/dL or so.  I suspect this is primarily related to hypertensive nephrosclerosis as well as possible decreased renal perfusion over time in the setting of severe aortic stenosis.  With an elevation in his creatinine his volume status has improved dramatically and at this point I would certainly tolerate a higher creatinine in an effort to maintain his volume over time.  Again he is doing quite well from that standpoint and does follow closely with cardiology.    #2.  Hypertension-blood pressure was noted to be modestly low and hydralazine dose was decreased.  He also remains on carvedilol, Isordil and furosemide      Thank you again for allowing me to participate in care of your patient.  Please do not hesitate to call with any questions or concerns.      Mustapha  MD Ike  1/31/2024  1:32 PM

## 2024-02-05 ENCOUNTER — OFFICE VISIT (OUTPATIENT)
Dept: FAMILY MEDICINE CLINIC | Facility: CLINIC | Age: 79
End: 2024-02-05
Payer: MEDICARE

## 2024-02-05 VITALS
OXYGEN SATURATION: 98 % | WEIGHT: 187 LBS | BODY MASS INDEX: 28.34 KG/M2 | SYSTOLIC BLOOD PRESSURE: 108 MMHG | RESPIRATION RATE: 18 BRPM | HEIGHT: 68 IN | HEART RATE: 48 BPM | DIASTOLIC BLOOD PRESSURE: 72 MMHG

## 2024-02-05 DIAGNOSIS — G31.9 CEREBRAL ATROPHY (HCC): ICD-10-CM

## 2024-02-05 DIAGNOSIS — I25.10 CORONARY ARTERY DISEASE INVOLVING NATIVE CORONARY ARTERY OF NATIVE HEART WITHOUT ANGINA PECTORIS: ICD-10-CM

## 2024-02-05 DIAGNOSIS — Z00.00 ENCOUNTER FOR ANNUAL HEALTH EXAMINATION: Primary | ICD-10-CM

## 2024-02-05 DIAGNOSIS — G47.00 INSOMNIA, UNSPECIFIED TYPE: ICD-10-CM

## 2024-02-05 DIAGNOSIS — I50.22 CHRONIC SYSTOLIC HEART FAILURE (HCC): ICD-10-CM

## 2024-02-05 DIAGNOSIS — H53.8 BLURRY VISION: ICD-10-CM

## 2024-02-05 DIAGNOSIS — N18.30 STAGE 3 CHRONIC KIDNEY DISEASE, UNSPECIFIED WHETHER STAGE 3A OR 3B CKD (HCC): Chronic | ICD-10-CM

## 2024-02-05 DIAGNOSIS — I50.32 CHRONIC DIASTOLIC CHF (CONGESTIVE HEART FAILURE) (HCC): ICD-10-CM

## 2024-02-05 DIAGNOSIS — G31.84 MILD COGNITIVE IMPAIRMENT: ICD-10-CM

## 2024-02-05 DIAGNOSIS — E11.69 TYPE 2 DIABETES MELLITUS WITH OTHER SPECIFIED COMPLICATION, WITHOUT LONG-TERM CURRENT USE OF INSULIN (HCC): ICD-10-CM

## 2024-02-05 DIAGNOSIS — N52.9 IMPOTENCE OF ORGANIC ORIGIN: ICD-10-CM

## 2024-02-05 DIAGNOSIS — I10 PRIMARY HYPERTENSION: ICD-10-CM

## 2024-02-05 PROBLEM — Z90.49 HISTORY OF HEMICOLECTOMY: Status: RESOLVED | Noted: 2021-09-21 | Resolved: 2024-02-05

## 2024-02-05 PROBLEM — K59.00 CONSTIPATION, UNSPECIFIED CONSTIPATION TYPE: Status: RESOLVED | Noted: 2023-03-25 | Resolved: 2024-02-05

## 2024-02-05 PROBLEM — G93.40 ACUTE ENCEPHALOPATHY: Status: RESOLVED | Noted: 2021-11-04 | Resolved: 2024-02-05

## 2024-02-05 PROBLEM — Z85.038 HISTORY OF COLON CANCER: Status: RESOLVED | Noted: 2021-09-21 | Resolved: 2024-02-05

## 2024-02-05 RX ORDER — CARVEDILOL 12.5 MG/1
12.5 TABLET ORAL DAILY
COMMUNITY
Start: 2023-12-27

## 2024-02-05 NOTE — PROGRESS NOTES
Subjective:   Leon Iniguez is a 78 year old male who presents for a MA (Medicare Advantage) Supervisit (Once per calendar year) and scheduled follow up of multiple significant but stable problems.   Following up on chronic health    Has been complaining about blurry eyes. Vision screen today relatively normal.    Had abnormal perfusion and plans angiogram.    Is sleeping quite a bit. Can make it harder to take his medications.    Hard time getting atorvastatin in in late afternoon .    Changes to hydralazine seems to be working, BP doing ok.    Has ongoign pain in his legs, and back, and shoulders, believes it is due to arthritis, tendonitis,    Takes tylenol, and sometimes aleve    History/Other:   Fall Risk Assessment:   He has been screened for Falls and is low risk.      Cognitive Assessment:   Abnormal  What day of the week is this?: Correct  What month is it?: Correct  What year is it?: Correct  Recall \"Ball\": Correct  Recall \"Flag\": Incorrect  Recall \"Tree\": Correct    Functional Ability/Status:   Leon Iniguez has some abnormal functions as listed below:  He has difficulties Managing Money/Bills based on screening of functional status.He has difficulties Shopping for Groceries based on screening of functional status. He has difficulties Taking Meds as Rx'd based on screening of functional status. He has Vision problems based on screening of functional status.       Depression Screening (PHQ-2/PHQ-9): PHQ-2 SCORE: 0  , done 2/5/2024          Advanced Directives:   He does NOT have a Living Will. [Do you have a living will?: No]  He does NOT have a Power of  for Health Care. [Do you have a healthcare power of ?: No]  Discussed Advance Care Planning with patient (and family/surrogate if present). Standard forms made available to patient in After Visit Summary.      Patient Active Problem List   Diagnosis    Type 2 diabetes mellitus with other specified complication, without long-term current use  of insulin ([facility])    Insomnia, unspecified    Impotence of organic origin    Cerebral atrophy (HCC)    Primary hypertension    Chronic diastolic CHF (congestive heart failure) ([facility])    Mild cognitive impairment    CHF, Systolic, Chronic    Coronary artery disease involving native coronary artery of native heart without angina pectoris    CKD (chronic kidney disease) stage 3, GFR 30-59 ml/min ([facility])     Allergies:  He is allergic to lisinopril.    Current Medications:  Outpatient Medications Marked as Taking for the 2/5/24 encounter (Office Visit) with Juan Jose Perez MD   Medication Sig    carvedilol 12.5 MG Oral Tab Take 1 tablet (12.5 mg total) by mouth daily.    isosorbide mononitrate ER 30 MG Oral Tablet 24 Hr Take 1 tablet (30 mg total) by mouth daily.    Polyethylene Glycol 3350 17 g Oral Powd Pack Take 17 g by mouth daily.    lidocaine ( LIDOCAINE PATCH) 4 % External Patch Place 1 patch onto the skin daily.    amLODIPine 5 MG Oral Tab Take 1 tablet (5 mg total) by mouth daily. Is BP is elevated he takes it    memantine 5 MG Oral Tab Take 1 tablet (5 mg total) by mouth 2 (two) times daily.    OLANZapine 5 MG Oral Tab Take 1 tablet (5 mg total) by mouth nightly.    hydrALAZINE 50 MG Oral Tab Take 1 tablet (50 mg total) by mouth in the morning and 1 tablet (50 mg total) before bedtime. (Patient taking differently: Take 0.5 tablets (25 mg total) by mouth in the morning and 0.5 tablets (25 mg total) before bedtime.)    aspirin 81 MG Oral Tab EC Take 1 tablet (81 mg total) by mouth daily.    atorvastatin 40 MG Oral Tab Take 1 tablet (40 mg total) by mouth nightly.    clopidogrel 75 MG Oral Tab Take 1 tablet (75 mg total) by mouth daily.       Medical History:  He  has a past medical history of Aortic stenosis, Back problem, Cancer ([facility]), CHF (congestive heart failure) ([facility]), Diabetes ([facility]), Essential hypertension, Hearing impairment, High blood pressure, High cholesterol, History of colon cancer, History of  hemicolectomy, Odontoid fracture with routine healing, and Visual impairment.  Surgical History:  He  has a past surgical history that includes Colectomy.   Family History:  His family history is not on file.  Social History:  He  reports that he quit smoking about 34 years ago. His smoking use included cigarettes. He has never used smokeless tobacco. He reports current alcohol use. He reports that he does not use drugs.    Tobacco:  He smoked tobacco in the past but quit greater than 12 months ago.  Social History    Tobacco Use      Smoking status: Former        Types: Cigarettes        Quit date: 2/3/1990        Years since quittin.0      Smokeless tobacco: Never         CAGE Alcohol Screen:   He has been screened for alcohol abuse and his score is not 0:  Cut: Have you ever felt you should Cut down on your drinking?: Yes  Annoyed: Have people Annoyed you by criticizing your drinking?: Yes  Guilty: Have you ever felt bad or Guilty about your drinking?: No  Eye Opener: Have you ever had a drink first thing in the morning to steady your nerves or to get rid of a hangover (Eye opener)?: No  Total Score: 2      Patient Care Team:  Juan Jose Perez MD as PCP - General (Family Medicine)  Main Mon MD as Consulting Physician (NEUROLOGY)  Soheila Mcgregor PT (Physical Therapy)  Soheila Mcgregor PT as Physical Therapist    Review of Systems  Negative except as noted in HPI    Objective:   Physical Exam  General Appearance:  Alert, cooperative, no distress, appears stated age   Head:  Normocephalic, without obvious abnormality, atraumatic   Eyes:  PERRL, conjunctiva/corneas clear, EOM's intact, both eyes   Ears:  Normal TM's and external ear canals, both ears   Nose: Nares normal, septum midline, mucosa normal, no drainage or sinus tenderness   Throat: Lips, mucosa, and tongue normal; teeth and gums normal   Neck: Supple, symmetrical, trachea midline, no adenopathy, thyroid: not enlarged, symmetric, no  tenderness/mass/nodules, no carotid bruit or JVD   Back:   Symmetric, no curvature, ROM normal, no CVA tenderness   Lungs:   Clear to auscultation bilaterally, respirations unlabored   Chest Wall:  No tenderness or deformity   Heart:  Regular rate and rhythm, S1, S2 normal, no murmur, rub or gallop   Abdomen:   Soft, non-tender, bowel sounds active all four quadrants,  no masses, no organomegaly   Genitalia: Normal male   Rectal: Normal tone, normal prostate, no masses or tenderness   Extremities: Extremities normal, atraumatic, no cyanosis or edema   Pulses: 2+ and symmetric   Skin: Skin color, texture, turgor normal, no rashes or lesions   Lymph nodes: Cervical, supraclavicular, and axillary nodes normal   Neurologic: Normal   Bilateral barefoot skin diabetic exam is normal, visualized feet and the appearance is normal.  Bilateral monofilament/sensation of both feet is normal.  Pulsation pedal pulse exam of both lower legs/feet is challenging for me to find.        /72   Pulse (!) 48   Resp 18   Ht 5' 8\" (1.727 m)   Wt 187 lb (84.8 kg)   SpO2 98%   BMI 28.43 kg/m²  Estimated body mass index is 28.43 kg/m² as calculated from the following:    Height as of this encounter: 5' 8\" (1.727 m).    Weight as of this encounter: 187 lb (84.8 kg).    Medicare Hearing Assessment:   Hearing Screening    Screening Method: Finger Rub  Finger Rub Result: Pass               Visual Acuity:   Right Eye Visual Acuity: Uncorrected Right Eye Chart Acuity: 20/30   Left Eye Visual Acuity: Uncorrected Left Eye Chart Acuity: 20/40   Both Eyes Visual Acuity: Uncorrected Both Eyes Chart Acuity: 20/30   Able To Tolerate Visual Acuity: Yes        Assessment & Plan:   Leon Iniguez is a 78 year old male who presents for a Medicare Assessment.     1. Encounter for annual health examination (Primary)  2. Stage 3 chronic kidney disease, unspecified whether stage 3a or 3b CKD (HCC) - Stable, follows with nephrology, just adjusted  medications  3. Type 2 diabetes mellitus with other specified complication, without long-term current use of insulin (HCC) - Well controlled, without medications  4. Chronic diastolic CHF (congestive heart failure) (HCC) - Stable, ongoing follows with cardiology  5. CHF, Systolic, Chronic - Stable, chronic, follows with cardiology  6. Primary hypertension - Controlled on current medications  7. Coronary artery disease involving native coronary artery of native heart without angina pectoris - Recent abnormal cardiac testing, angiogram planned next week  8. Mild cognitive impairment - Chronic, though overall doing well, asking appropriate questions, answering appropriately.  9. Insomnia, unspecified type - Not active concern at this time  10. Impotence of organic origin - Chronic, not active concern  11. Cerebral atrophy (HCC) - Noting on imaging chronic.  12. Blurry vision - Ok vision today plan specialist evaluation.  -     Ophthalmology Referral - In Network    The patient indicates understanding of these issues and agrees to the plan.  Reviewed lab work  Reinforced healthy diet, lifestyle, and exercise.      No follow-ups on file.     Juan Jose Perez MD, 2/5/2024     Supplementary Documentation:   General Health:  In the past six months, have you lost more than 10 pounds without trying?: 2 - No  Has your appetite been poor?: No  Type of Diet: Low Salt  How does the patient maintain a good energy level?: Daily Walks  How would you describe your daily physical activity?: Light  How would you describe your current health state?: Fair  How do you maintain positive mental well-being?: Social Interaction  On a scale of 0 to 10, with 0 being no pain and 10 being severe pain, what is your pain level?: 5 - (Moderate)  In the past six months, have you experienced urine leakage?: 1-Yes  At any time do you feel concerned for the safety/well-being of yourself and/or your children, in your home or elsewhere?: Yes  Have you had  any immunizations at another office such as Influenza, Hepatitis B, Tetanus, or Pneumococcal?: No          Leon Iniguez's SCREENING SCHEDULE   Tests on this list are recommended by your physician but may not be covered, or covered at this frequency, by your insurer.   Please check with your insurance carrier before scheduling to verify coverage.   PREVENTATIVE SERVICES FREQUENCY &  COVERAGE DETAILS LAST COMPLETION DATE   Diabetes Screening    Fasting Blood Sugar / Glucose    One screening every 12 months if never tested or if previously tested but not diagnosed with pre-diabetes   One screening every 6 months if diagnosed with pre-diabetes Lab Results   Component Value Date    GLUCOSE 138 (H) 02/03/2010     (H) 01/25/2024        Cardiovascular Disease Screening    Lipid Panel  Cholesterol  Lipoprotein (HDL)  Triglycerides Covered every 5 years for all Medicare beneficiaries without apparent signs or symptoms of cardiovascular disease Lab Results   Component Value Date    CHOLEST 129 03/16/2023    HDL 46 03/16/2023    LDL 65 03/16/2023    TRIG 97 03/16/2023         Electrocardiogram (EKG)   Covered if needed at Welcome to Medicare, and non-screening if indicated for medical reasons 06/13/2023      Ultrasound Screening for Abdominal Aortic Aneurysm (AAA) Covered once in a lifetime for one of the following risk factors    Men who are 65-75 years old and have ever smoked    Anyone with a family history -     Colorectal Cancer Screening  Covered for ages 50-85; only need ONE of the following:    Colonoscopy   Covered every 10 years    Covered every 2 years if patient is at high risk or previous colonoscopy was abnormal -    No recommendations at this time    Flexible Sigmoidoscopy   Covered every 4 years -    Fecal Occult Blood Test Covered annually -   Prostate Cancer Screening    Prostate-Specific Antigen (PSA) Annually Lab Results   Component Value Date    PSA 1.75 09/24/2021     There are no preventive care  reminders to display for this patient.   Immunizations    Influenza Covered once per flu season  Please get every year 09/20/2023  No recommendations at this time    Pneumococcal Each vaccine (Zigzrtb72 & Qraxxlcyx88) covered once after 65 Prevnar 13: -    Uaaonoarj15: 09/21/2021     Pneumococcal Vaccination(2 of 2 - PCV) due on 09/21/2022    Hepatitis B One screening covered for patients with certain risk factors   -  No recommendations at this time    Tetanus Toxoid Not covered by Medicare Part B unless medically necessary (cut with metal); may be covered with your pharmacy prescription benefits -    Tetanus, Diptheria and Pertusis TD and TDaP Not covered by Medicare Part B -  No recommendations at this time    Zoster Not covered by Medicare Part B; may be covered with your pharmacy  prescription benefits -  Zoster Vaccines(1 of 2) Never done     Diabetes      Hemoglobin A1C Annually; if last result is elevated, may be asked to retest more frequently.    Medicare covers every 3 months Lab Results   Component Value Date     01/25/2024    A1C 5.6 01/25/2024       No recommendations at this time    Creat/alb ratio Annually Lab Results   Component Value Date    MICROALBCREA 197.1 (H) 07/25/2023       LDL Annually Lab Results   Component Value Date    LDL 65 03/16/2023       Dilated Eye Exam Annually Last Diabetic Eye Exam:  No data recorded  No data recorded       Annual Monitoring of Persistent Medications (ACE/ARB, digoxin diuretics, anticonvulsants)    Potassium Annually Lab Results   Component Value Date    K 4.5 01/25/2024         Creatinine   Annually Lab Results   Component Value Date    CREATSERUM 2.03 (H) 01/25/2024         BUN Annually Lab Results   Component Value Date    BUN 56 (H) 01/25/2024       Drug Serum Conc Annually No results found for: \"DIGOXIN\", \"DIG\", \"VALP\"

## 2024-02-05 NOTE — PATIENT INSTRUCTIONS
Leon Iniguez's SCREENING SCHEDULE   Tests on this list are recommended by your physician but may not be covered, or covered at this frequency, by your insurer.   Please check with your insurance carrier before scheduling to verify coverage.   PREVENTATIVE SERVICES FREQUENCY &  COVERAGE DETAILS LAST COMPLETION DATE   Diabetes Screening    Fasting Blood Sugar / Glucose    One screening every 12 months if never tested or if previously tested but not diagnosed with pre-diabetes   One screening every 6 months if diagnosed with pre-diabetes Lab Results   Component Value Date    GLUCOSE 138 (H) 02/03/2010     (H) 01/25/2024        Cardiovascular Disease Screening    Lipid Panel  Cholesterol  Lipoprotein (HDL)  Triglycerides Covered every 5 years for all Medicare beneficiaries without apparent signs or symptoms of cardiovascular disease Lab Results   Component Value Date    CHOLEST 129 03/16/2023    HDL 46 03/16/2023    LDL 65 03/16/2023    TRIG 97 03/16/2023         Electrocardiogram (EKG)   Covered if needed at Welcome to Medicare, and non-screening if indicated for medical reasons 06/13/2023      Ultrasound Screening for Abdominal Aortic Aneurysm (AAA) Covered once in a lifetime for one of the following risk factors   • Men who are 65-75 years old and have ever smoked   • Anyone with a family history -     Colorectal Cancer Screening  Covered for ages 50-85; only need ONE of the following:    Colonoscopy   Covered every 10 years    Covered every 2 years if patient is at high risk or previous colonoscopy was abnormal -    No recommendations at this time    Flexible Sigmoidoscopy   Covered every 4 years -    Fecal Occult Blood Test Covered annually -   Prostate Cancer Screening    Prostate-Specific Antigen (PSA) Annually Lab Results   Component Value Date    PSA 1.75 09/24/2021     There are no preventive care reminders to display for this patient.   Immunizations    Influenza Covered once per flu season  Please get  every year 09/20/2023  No recommendations at this time    Pneumococcal Each vaccine (Sfaivvj36 & Hwppoliiy85) covered once after 65 Prevnar 13: -    Wasjtdfrb21: 09/21/2021     Pneumococcal Vaccination(2 of 2 - PCV) due on 09/21/2022    Hepatitis B One screening covered for patients with certain risk factors   -  No recommendations at this time    Tetanus Toxoid Not covered by Medicare Part B unless medically necessary (cut with metal); may be covered with your pharmacy prescription benefits -    Tetanus, Diptheria and Pertusis TD and TDaP Not covered by Medicare Part B -  No recommendations at this time    Zoster Not covered by Medicare Part B; may be covered with your pharmacy  prescription benefits -  Zoster Vaccines(1 of 2) Never done     Diabetes      Hemoglobin A1C Annually; if last result is elevated, may be asked to retest more frequently.    Medicare covers every 3 months Lab Results   Component Value Date     01/25/2024    A1C 5.6 01/25/2024       No recommendations at this time    Creat/alb ratio Annually Lab Results   Component Value Date    MICROALBCREA 197.1 (H) 07/25/2023       LDL Annually Lab Results   Component Value Date    LDL 65 03/16/2023       Dilated Eye Exam Annually [unfilled]     Annual Monitoring of Persistent Medications (ACE/ARB, digoxin diuretics, anticonvulsants)    Potassium Annually Lab Results   Component Value Date    K 4.5 01/25/2024         Creatinine   Annually Lab Results   Component Value Date    CREATSERUM 2.03 (H) 01/25/2024         BUN Annually Lab Results   Component Value Date    BUN 56 (H) 01/25/2024       Drug Serum Conc Annually No results found for: \"DIGOXIN\", \"DIG\", \"VALP\"

## 2024-02-06 VITALS — WEIGHT: 183 LBS | BODY MASS INDEX: 27.74 KG/M2 | HEIGHT: 68 IN

## 2024-02-06 RX ORDER — SENNOSIDES 8.6 MG
8.6 TABLET ORAL NIGHTLY PRN
COMMUNITY

## 2024-02-12 ENCOUNTER — HOSPITAL ENCOUNTER (OUTPATIENT)
Dept: INTERVENTIONAL RADIOLOGY/VASCULAR | Facility: HOSPITAL | Age: 79
Discharge: HOME | End: 2024-02-12
Attending: INTERNAL MEDICINE
Payer: MEDICARE

## 2024-02-14 ENCOUNTER — TELEPHONE (OUTPATIENT)
Dept: NEPHROLOGY | Facility: CLINIC | Age: 79
End: 2024-02-14

## 2024-02-14 NOTE — TELEPHONE ENCOUNTER
Pt's wife called to notify that pt had an abnormal stress test and plans for angiogram on 2/26/24 per cardiologist Dr. Lakhani

## 2024-02-15 NOTE — H&P
Please see dictated office note scanned into the EMR.    Patient is without complaints.  He had a nuclear medicine stress test that revealed a small anterolateral and inferior ischemia.    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    Risks, benefits, and alternatives of cardiac cath/PCI discussed in detail.  Risks include but not limited to death, MI, CVA, emergency CABG, transfusion, and surgical repair of vascular complications.    Chance of a staged procedure also explained.    Questions answered.  Patient agrees to proceed.    Damir Patterson MD

## 2024-02-20 NOTE — PAT NURSING NOTE
Per PAT encounter/[email or web address] message sent to pt (wife took notes):    Preprocedure Instructions     Visitor Instructions     Adult Patients: 2 Adult [facility] can accompany the patient day of procedure. 2 [facility] may visit [phone] during inpatient stay     You are scheduled for: a Cardiac Procedure     Date of Procedure: 02/26/24 Monday     Diet Instructions: Do not eat or drink anything after midnight     Medications: Take an Aspirin 81 mg tablet the day of your procedure, Take Plavix 75mg the day of your procedure, Medications you are allowed to take can be taken with a sip of water the morning of your procedure     Medications to Stop: Hold herbal supplements and vitamins     Skin Prep: Shower with antibacterial soap using a clean washcloth, prior to procedure     Arrival Time: You will receive a call the Friday before your procedure after 3 pm on what time you should arrive the day of your procedure    Driving After Procedure: If sedation is given, you WILL NOT be able to drive home. You will need a responsible adult  to drive you home., Cannot take uber or cab unless approved by physician     Discharge Teaching: Your nurse will give you specific instructions before discharge, Most people can resume normal activities in 2-3 days, Any questions, please call the physician's office      parking is available starting at 6 am or park in the [place] parking garage at [facility]. Check in at the [facility] reception desk. Our  will be there to check you in for your procedure. Please bring your insurance cards and ID with you.                                                                                                                                      Please DO NOT respond to this message, the inbasket is not monitored for messages. For any questions, please call the physician's office.

## 2024-02-26 ENCOUNTER — HOSPITAL ENCOUNTER (OUTPATIENT)
Dept: INTERVENTIONAL RADIOLOGY/VASCULAR | Facility: HOSPITAL | Age: 79
Discharge: HOME | End: 2024-02-26
Attending: INTERNAL MEDICINE | Admitting: INTERNAL MEDICINE
Payer: MEDICARE

## 2024-02-26 VITALS
RESPIRATION RATE: 13 BRPM | HEART RATE: 48 BPM | DIASTOLIC BLOOD PRESSURE: 65 MMHG | WEIGHT: 182 LBS | BODY MASS INDEX: 27.58 KG/M2 | OXYGEN SATURATION: 99 % | HEIGHT: 68 IN | TEMPERATURE: 97 F | SYSTOLIC BLOOD PRESSURE: 142 MMHG

## 2024-02-26 DIAGNOSIS — I25.10 CAD (CORONARY ARTERY DISEASE): ICD-10-CM

## 2024-02-26 DIAGNOSIS — R94.39 ABNORMAL STRESS TEST: ICD-10-CM

## 2024-02-26 PROCEDURE — 93454 CORONARY ARTERY ANGIO S&I: CPT | Performed by: INTERNAL MEDICINE

## 2024-02-26 PROCEDURE — 93458 L HRT ARTERY/VENTRICLE ANGIO: CPT | Performed by: INTERNAL MEDICINE

## 2024-02-26 PROCEDURE — 99152 MOD SED SAME PHYS/QHP 5/>YRS: CPT | Performed by: INTERNAL MEDICINE

## 2024-02-26 PROCEDURE — B211YZZ FLUOROSCOPY OF MULTIPLE CORONARY ARTERIES USING OTHER CONTRAST: ICD-10-PCS | Performed by: INTERNAL MEDICINE

## 2024-02-26 RX ORDER — IODIXANOL 320 MG/ML
100 INJECTION, SOLUTION INTRAVASCULAR
Status: COMPLETED | OUTPATIENT
Start: 2024-02-26 | End: 2024-02-26

## 2024-02-26 RX ORDER — NITROGLYCERIN 20 MG/100ML
INJECTION INTRAVENOUS
Status: COMPLETED
Start: 2024-02-26 | End: 2024-02-26

## 2024-02-26 RX ORDER — VERAPAMIL HYDROCHLORIDE 2.5 MG/ML
INJECTION, SOLUTION INTRAVENOUS
Status: COMPLETED
Start: 2024-02-26 | End: 2024-02-26

## 2024-02-26 RX ORDER — SODIUM CHLORIDE 9 MG/ML
INJECTION, SOLUTION INTRAVENOUS CONTINUOUS
Status: DISCONTINUED | OUTPATIENT
Start: 2024-02-26 | End: 2024-02-26

## 2024-02-26 RX ORDER — HEPARIN SODIUM 5000 [USP'U]/ML
INJECTION, SOLUTION INTRAVENOUS; SUBCUTANEOUS
Status: COMPLETED
Start: 2024-02-26 | End: 2024-02-26

## 2024-02-26 RX ORDER — SODIUM CHLORIDE 9 MG/ML
INJECTION, SOLUTION INTRAVENOUS
Status: DISCONTINUED | OUTPATIENT
Start: 2024-02-27 | End: 2024-02-26 | Stop reason: HOSPADM

## 2024-02-26 RX ORDER — MIDAZOLAM HYDROCHLORIDE 1 MG/ML
INJECTION INTRAMUSCULAR; INTRAVENOUS
Status: COMPLETED
Start: 2024-02-26 | End: 2024-02-26

## 2024-02-26 RX ORDER — LIDOCAINE HYDROCHLORIDE 10 MG/ML
INJECTION, SOLUTION EPIDURAL; INFILTRATION; INTRACAUDAL; PERINEURAL
Status: COMPLETED
Start: 2024-02-26 | End: 2024-02-26

## 2024-02-26 RX ADMIN — IODIXANOL 95 ML: 320 INJECTION, SOLUTION INTRAVASCULAR at 08:33:00

## 2024-02-26 RX ADMIN — SODIUM CHLORIDE: 9 INJECTION, SOLUTION INTRAVENOUS at 10:15:00

## 2024-02-26 NOTE — PROCEDURES
[facility]    PATIENT'S NAME: GLADIS SILVEIRA   ATTENDING PHYSICIAN: Isadora Lakhani M.D.   OPERATING PHYSICIAN: Damir Patterson M.D.   PATIENT ACCOUNT#:   [id number]    LOCATION:  [address]  MEDICAL RECORD #:   [id number]       YOB: 1945  ADMISSION DATE:       02/26/2024      OPERATION DATE:  02/26/2024    CARDIAC PROCEDURE TRANSCRIPTION    CARDIAC CATHETERIZATION     PREOPERATIVE DIAGNOSIS:    1.   Abnormal stress test.  2.   History of TAVR.  3.   History of coronary artery disease status post PCI.  4.   Renal insufficiency.    POSTOPERATIVE DIAGNOSIS:  Coronary artery disease.  PROCEDURE PERFORMED:  Coronary angiography.    SEDATION:  The patient received conscious sedation.  This was monitored by myself and the catheterization lab staff.  This started at 0802 and ended at 0823.    CLINICAL HISTORY:  The patient was referred by Dr. Lakhani for angiography.  The patient had an inferolateral as well as a small anterolateral defect.    DESCRIPTION OF PROCEDURE:  After detailed informed consent was obtained, the patient's right radial was prepped and draped in usual sterile fashion.  Using ultrasound, right radial was identified and entered using a micropuncture technique under direct visualization.  The patient did receive lidocaine over the right radial.  A 5/6 Glidesheath was placed.  The patient received 200 mg of nitroglycerin and 2.5 mg of verapamil.  A total of 4000 units of heparin was given.  Scar right and left catheters used for the diagnostic procedure.  At the end of the case, TR band was placed over the right radial.  No apparent acute complications noted, and the patient tolerated the procedure well.    FINDINGS:  The patient appeared to have a very horizontal-appearing heart.  I was able to get in the 6-Macedonian JL4 catheter into the left main just above the takeoff of the TAVR cage.  This, however, pointed somewhat upward.  Injection of the left main revealed a  very short left main which bifurcates into left anterior descending and circumflex.  The left anterior descending artery has a stent really from its ostium extending into the midsection which is widely patent.  There is a diagonal of moderate size thereafter with an 80% stenosis in its very-proximal section.  The LAD is smaller caliber thereafter with a relative stenosis of about 30% to 40%.  There is a stent in the mid to distal LAD which is free of significant disease.  Septal collaterals appear to reconstitute to what appears to be a posterior descending artery from the right coronary artery.    The circumflex appears to be part of most likely a left dominant system.  stent appears to extend from its very-proximal section into a posterolateral branch.  In the very-proximal segment, there is in-stent restenosis of about 40% to 50%.  This is eccentric.  This is in the proximal 10 mm of the stent.  Posterolateral branches appear to be free of significant disease, and the posterior descending artery appears to be free of significant disease.    Right coronary artery was difficult to get into but appears to be occluded proximally.  The right coronary artery has an 80% stenosis proximally and then occlusion after the takeoff of a marginal branch.    SUMMARY:    1.   Moderate in-stent restenosis involving the proximal circumflex.  2.   Stenotic diagonal which appears unchanged from previous imaging.  3.   Occluded right coronary that fills late.    The patient's stress testing appears to be consistent with the above anatomy.  The patient currently is having no symptoms.  Given this, we will manage him medically at the moment.    Dictated By Damir Patterson M.D.  d: 02/26/2024 08:29:35  t: 02/26/2024 09:41:54  [facility] [id number]  AR/

## 2024-02-26 NOTE — PLAN OF CARE
Patient had C today with Dr. Patterson. Right wrist access site with TR band in place. Site is CDI. Patient denies any numbness or tingling to right hand/fingers. Patient has good O2 pleth on right hand. VSS. Patient denies any pain. Patient's family @ bedside. Dr. Patterson @ bedside post procedure. IVF running @ 100cc/hr. Patient tolerating po intake. Air intermittently released from TR band until all air removed. TR band removed. Site is soft, CDI, +1 radial pulse. Occlusive dressing applied. Patient completed recovery time. Discharge instructions reviewed. IV D/C'd. Patient discharged to [facility] by wheelchair with belongings. Patient's son is .

## 2024-03-06 PROBLEM — G30.9 ALZHEIMER'S DEMENTIA WITH BEHAVIORAL DISTURBANCE (HCC): Status: ACTIVE | Noted: 2024-03-06

## 2024-03-06 PROBLEM — F02.818 ALZHEIMER'S DEMENTIA WITH BEHAVIORAL DISTURBANCE (HCC): Status: ACTIVE | Noted: 2024-03-06

## 2024-04-23 NOTE — TELEPHONE ENCOUNTER
Medication: Memantine     Date of last ref[place]:   [place] 01/25/2024     Last office visit: 09/14/2023  Due back to clinic per last office note:  1 year  Date next office visit scheduled:    Future Appointments   Date Time Provider Department Center   5/3/2024  1:45 PM Juan Jose Perez MD [facility] [address]   5/21/2024 11:45 AM Apn, Structural Heart [facility]   8/7/2024  1:40 PM Mustapha Ramires MD [facility] [place]           Last OV note recommendation:    Plan:  Cont namenda 5 mg bid, may increase to 10 mg in the future  Cont depakote, olanzapine per psychiatrist  Fall precaution  See orders and medications filed with this encounter. The patient indicates understanding of these issues and agrees with the plan.  Discussed with patient/family regarding assessment, care plan   RTC 1 year  Pt should go ER for any new or worsening symptoms and contact office

## 2024-04-23 NOTE — TELEPHONE ENCOUNTER
Patient wife stated her  needs a refill on memantine 5 MG Oral Tab patient stated her and dr nick can refill it stated by the patient ?

## 2024-04-25 RX ORDER — MEMANTINE HYDROCHLORIDE 5 MG/1
5 TABLET ORAL 2 TIMES DAILY
Qty: 180 TABLET | Refills: 1 | Status: SHIPPED | OUTPATIENT
Start: 2024-04-25

## 2024-05-06 ENCOUNTER — LAB ENCOUNTER (OUTPATIENT)
Dept: LAB | Age: 79
End: 2024-05-06
Attending: FAMILY MEDICINE
Payer: MEDICARE

## 2024-05-06 ENCOUNTER — OFFICE VISIT (OUTPATIENT)
Dept: FAMILY MEDICINE CLINIC | Facility: CLINIC | Age: 79
End: 2024-05-06
Payer: MEDICARE

## 2024-05-06 VITALS
HEART RATE: 63 BPM | OXYGEN SATURATION: 98 % | RESPIRATION RATE: 16 BRPM | SYSTOLIC BLOOD PRESSURE: 122 MMHG | BODY MASS INDEX: 28.04 KG/M2 | HEIGHT: 68 IN | DIASTOLIC BLOOD PRESSURE: 66 MMHG | WEIGHT: 185 LBS

## 2024-05-06 DIAGNOSIS — Z51.81 MEDICATION MONITORING ENCOUNTER: ICD-10-CM

## 2024-05-06 DIAGNOSIS — M25.551 BILATERAL HIP PAIN: ICD-10-CM

## 2024-05-06 DIAGNOSIS — N18.30 STAGE 3 CHRONIC KIDNEY DISEASE, UNSPECIFIED WHETHER STAGE 3A OR 3B CKD (HCC): Primary | Chronic | ICD-10-CM

## 2024-05-06 DIAGNOSIS — H53.8 BLURRY VISION: ICD-10-CM

## 2024-05-06 DIAGNOSIS — M25.552 BILATERAL HIP PAIN: ICD-10-CM

## 2024-05-06 DIAGNOSIS — N18.30 STAGE 3 CHRONIC KIDNEY DISEASE, UNSPECIFIED WHETHER STAGE 3A OR 3B CKD (HCC): Chronic | ICD-10-CM

## 2024-05-06 LAB
ALBUMIN SERPL-MCNC: 3.3 G/DL (ref 3.4–5)
ALBUMIN/GLOB SERPL: 0.9 {RATIO} (ref 1–2)
ALP LIVER SERPL-CCNC: 64 U/L
ALT SERPL-CCNC: 14 U/L
ANION GAP SERPL CALC-SCNC: 5 MMOL/L (ref 0–18)
AST SERPL-CCNC: 11 U/L (ref 15–37)
BILIRUB SERPL-MCNC: 0.4 MG/DL (ref 0.1–2)
BUN BLD-MCNC: 38 MG/DL (ref 9–23)
CALCIUM BLD-MCNC: 9.2 MG/DL (ref 8.5–10.1)
CHLORIDE SERPL-SCNC: 111 MMOL/L (ref 98–112)
CO2 SERPL-SCNC: 24 MMOL/L (ref 21–32)
CREAT BLD-MCNC: 1.76 MG/DL
EGFRCR SERPLBLD CKD-EPI 2021: 39 ML/MIN/1.73M2 (ref 60–?)
FASTING STATUS PATIENT QL REPORTED: NO
GLOBULIN PLAS-MCNC: 3.7 G/DL (ref 2.8–4.4)
GLUCOSE BLD-MCNC: 108 MG/DL (ref 70–99)
OSMOLALITY SERPL CALC.SUM OF ELEC: 300 MOSM/KG (ref 275–295)
POTASSIUM SERPL-SCNC: 4.6 MMOL/L (ref 3.5–5.1)
PROT SERPL-MCNC: 7 G/DL (ref 6.4–8.2)
SODIUM SERPL-SCNC: 140 MMOL/L (ref 136–145)
VALPROATE SERPL-MCNC: 21.6 UG/ML (ref 50–100)

## 2024-05-06 PROCEDURE — 80053 COMPREHEN METABOLIC PANEL: CPT

## 2024-05-06 PROCEDURE — 80164 ASSAY DIPROPYLACETIC ACD TOT: CPT

## 2024-05-06 PROCEDURE — 99213 OFFICE O/P EST LOW 20 MIN: CPT | Performed by: FAMILY MEDICINE

## 2024-05-06 PROCEDURE — 36415 COLL VENOUS BLD VENIPUNCTURE: CPT

## 2024-05-06 NOTE — PROGRESS NOTES
[facility] Progress Note    SUBJECTIVE: Leon Iniguez 78 year old male is here today for   Chief Complaint   Patient presents with    Follow - Up       Needs medication monitoring.    Still feeling cold often, but feeling better. Not as bad in the last few days.    Went to a [employer] party at their senior living.     They can play scrabble, he will play [employer].    Wife manages his care.    Reviewed Dr. Lakhani, cardiology note,    They will go to 'Feed my starving chidlren' to participate.    Has remained off the hydralazine, and BP is holding good, not elevated.    Stent is blocked but asymptomatic.    Can make a first, on left, painful to make a tight fist.    Dealing with blurred vision in right eye    Notes gets pain in his legs when he walks. In the back of the leg sin buttocks and upper thigh, and quads.    PMH  Past Medical History:    Aortic stenosis    Back problem    Cancer (HCC)    CHF (congestive heart failure) (HCC)    Coronary atherosclerosis    Diabetes (HCC)    Essential hypertension    Hearing impairment    High blood pressure    High cholesterol    History of colon cancer    History of hemicolectomy    Odontoid fracture with routine healing    Visual impairment        PSH  Past Surgical History:   Procedure Laterality Date    Colectomy      part of colon taken        Social Hx:  No changes    ROS  See HPI    OBJECTIVE:  /66   Pulse 63   Resp 16   Ht 5' 8\" (1.727 m)   Wt 185 lb (83.9 kg)   SpO2 98%   BMI 28.13 kg/m²       CV: RRR, s1 and s2 present, no murmurs clicks or rubs  Resp: clear to auscultation bilaterally        Labs:          Meds:   Current Outpatient Medications   Medication Sig Dispense Refill    memantine 5 MG Oral Tab Take 1 tablet (5 mg total) by mouth 2 (two) times daily. 180 tablet 1    sennosides 8.6 MG Oral Tab Take 1 tablet (8.6 mg total) by mouth nightly as needed.      carvedilol 12.5 MG Oral Tab Take 1 tablet (12.5 mg total) by mouth 2 (two) times  daily with meals.      Polyethylene Glycol 3350 17 g Oral Powd Pack Take 17 g by mouth daily.      divalproex  MG Oral Tab EC DR tab Take 1 tablet (250 mg total) by mouth every evening.      OLANZapine 5 MG Oral Tab Take 1 tablet (5 mg total) by mouth nightly.      aspirin 81 MG Oral Tab EC Take 1 tablet (81 mg total) by mouth daily. 30 tablet 11    atorvastatin 40 MG Oral Tab Take 1 tablet (40 mg total) by mouth nightly. 30 tablet 3    clopidogrel 75 MG Oral Tab Take 1 tablet (75 mg total) by mouth daily. 30 tablet 11         Assessment/Plan  Leon was seen today for follow - up.    Diagnoses and all orders for this visit:    Stage 3 chronic kidney disease, unspecified whether stage 3a or 3b CKD (HCC)  -     VALPROIC ACID [916] [Q]; Future  -     Comp Metabolic Panel (14) [E]; Future    Medication monitoring encounter  -     VALPROIC ACID [916] [Q]; Future  -     Comp Metabolic Panel (14) [E]; Future    Blurry vision  -     Ophthalmology Referral - External    Bilateral hip pain  -     Ortho Referral - In Network         Will check labs for monitoring medications, recommend he see ophthalmology for vision changes.    Return to ortho as was doing well with plan made for his hip and leg pain         Total Time spent with patient and coordinating care:  20 minutes.    Follow up: as needed      Juan Jose Perez MD

## 2024-05-21 ENCOUNTER — HOSPITAL ENCOUNTER (OUTPATIENT)
Dept: CARDIOLOGY CLINIC | Facility: HOSPITAL | Age: 79
Discharge: HOME | End: 2024-05-21
Attending: INTERNAL MEDICINE

## 2024-05-21 VITALS
SYSTOLIC BLOOD PRESSURE: 123 MMHG | RESPIRATION RATE: 15 BRPM | DIASTOLIC BLOOD PRESSURE: 63 MMHG | HEART RATE: 78 BPM | OXYGEN SATURATION: 99 %

## 2024-05-21 DIAGNOSIS — Z95.2 S/P TAVR (TRANSCATHETER AORTIC VALVE REPLACEMENT): Primary | ICD-10-CM

## 2024-05-21 PROCEDURE — 99213 OFFICE O/P EST LOW 20 MIN: CPT | Performed by: NURSE PRACTITIONER

## 2024-05-21 NOTE — PROGRESS NOTES
[facility]  TAVR Clinic Note    Subjective:   Patient presents for 1 year postop TAVR APN visit, accompanied by his wife.  He underwent TF TAVR with 29 mm ultra Resilia valve on 5/25/2023 due to severe, symptomatic aortic stenosis.  Since our last visit in November, he had a normal Lexiscan stress test, and an angiogram 2/24-medical management recommended.  He is asymptomatic currently.  His most recent echo shows improvement in EF up to 62% from previous echo at 50%.  He follows with Dr. Mon from neurology for his dementia, and per his wife his mood and memory have improved slightly with medication changes.  He is able to perform his ADLs without symptoms.  He currently denies: CP, SOB, fatigue, dizziness, palpitations, when she is at present.       Review of Systems   Constitutional: Negative.   HENT: Negative.     Eyes: Negative.    Cardiovascular: Negative.    Respiratory: Negative.     Endocrine: Negative.    Skin: Negative.    Musculoskeletal: Negative.    Gastrointestinal: Negative.    Genitourinary: Negative.    Neurological: Negative.    Psychiatric/Behavioral:  Positive for memory loss.        Objective:   /63 (BP Location: Left arm)   Pulse 78   Resp 15   SpO2 99%   Tele - NSR    Physical Exam:   General: AAO, NAD  HEENT: PERRL, MMM  Neck: No JVD  Cardiac: RRR, S1, S2, no murmur or rub noted  Lungs:  CTA bilat  Abdomen: soft, NT, ND, BS +  Extremities: warm, dry, no edema noted  Neurologic: AAO x 3  Skin: groin incision CDI, well healed    Laboratory/Data:  Echo: Echo 11/21/2023  EF 62%, concentric LVH mild.  No RWMA noted.  RV function normal, TAPSE 1.69  PASP 27, RA 3  Aortic valve-TAVR function normal.  Mean gradient 5, peak velocity 1.5M/S, no significant AI noted  Mitral valve-mild calcification, 1-2+ MR, mean mitral gradient 3 mmHg  Tricuspid valve-1+ TR  Pericardial effusion noted    Labs:    Lab Results   Component Value Date     (H) 05/06/2024    BUN 38 (H) 05/06/2024     CREATSERUM 1.76 (H) 05/06/2024    BUNCREA 29.0 (H) 09/24/2021    ANIONGAP 5 05/06/2024    GFRAA 53 (L) 09/16/2021    GFRNAA 46 (L) 09/16/2021    CA 9.2 05/06/2024     05/06/2024    K 4.6 05/06/2024     05/06/2024    CO2 24.0 05/06/2024    OSMOCALC 300 (H) 05/06/2024     Lab Results   Component Value Date    WBC 7.9 01/25/2024    RBC 3.50 (L) 01/25/2024    HGB 11.3 (L) 01/25/2024    HCT 33.9 (L) 01/25/2024    MCV 96.9 01/25/2024    MCH 32.3 01/25/2024    MCHC 33.3 01/25/2024    RDW 12.4 01/25/2024    .0 01/25/2024       Lab Results   Component Value Date    INR 1.24 (H) 05/26/2023    INR 1.27 (H) 05/25/2023       Medications:  Current Outpatient Medications   Medication Sig Dispense Refill    memantine 5 MG Oral Tab Take 1 tablet (5 mg total) by mouth 2 (two) times daily. 180 tablet 1    carvedilol 12.5 MG Oral Tab Take 1 tablet (12.5 mg total) by mouth 2 (two) times daily with meals.      Polyethylene Glycol 3350 17 g Oral Powd Pack Take 17 g by mouth daily.      divalproex  MG Oral Tab EC DR tab Take 1 tablet (250 mg total) by mouth every evening.      OLANZapine 5 MG Oral Tab Take 1 tablet (5 mg total) by mouth nightly.      aspirin 81 MG Oral Tab EC Take 1 tablet (81 mg total) by mouth daily. 30 tablet 11    atorvastatin 40 MG Oral Tab Take 1 tablet (40 mg total) by mouth nightly. 30 tablet 3    clopidogrel 75 MG Oral Tab Take 1 tablet (75 mg total) by mouth daily. 30 tablet 11    sennosides 8.6 MG Oral Tab Take 1 tablet (8.6 mg total) by mouth nightly as needed. (Patient not taking: Reported on 5/21/2024)       Assessment:  s/p RTF 29mm Ultra TAVR d/t severe, symptomatic aortic stenosis--5/25/23  Chronic HFrEF, EF 35%pre TAVR, -with normalization in EF 60% on most recent echo  CAD hx multiple PCI/ALEKSANDER, most recent ALEKSANDER x4 to left Cx, ALEKSANDER x2 LAD  3/8/23  HTN-controlled  HL-statin  DMII  CA s/p hemicolectomy  CKD--baseline CRT approximately 1.5 -follows with nephrology  Memory loss/?dementia  - follows with Dr. Mon from neurology    Plan:    Complete TVT KCCQ- results reviewed   Echo today   Increase acitivity as tolerated   F/U with primary cardiologist as directed -Dr. Lakhani    I have spent 20 minutes with this patient with > 50% of my time spent in education, coordination, and counseling related to their care. We specifically discussed low NA, heart healthy diet, importance of continued daily exercise, compliance with medications, Abx prophylaxis for dental/surgical procedures and f/u with specialists as directed.  He has completed his annual follow-up with structural heart clinic, and no longer needs to follow here unless clinically indicated.  Thank you for allowing us to participate in patient's care.    Celine Mccoy, JULIOCESAR  5/21/2024   3:08 PM

## 2024-06-08 NOTE — DISCHARGE INSTRUCTIONS
I strongly recommend you proceed to the emergency department now.   Referrals for a neurologist and a nephrologist provided
Patient

## 2024-08-05 ENCOUNTER — LAB ENCOUNTER (OUTPATIENT)
Dept: LAB | Facility: HOSPITAL | Age: 79
End: 2024-08-05
Attending: INTERNAL MEDICINE
Payer: MEDICARE

## 2024-08-05 DIAGNOSIS — I10 ESSENTIAL HYPERTENSION: ICD-10-CM

## 2024-08-05 DIAGNOSIS — N18.30 STAGE 3 CHRONIC KIDNEY DISEASE, UNSPECIFIED WHETHER STAGE 3A OR 3B CKD (HCC): ICD-10-CM

## 2024-08-05 LAB
ANION GAP SERPL CALC-SCNC: 1 MMOL/L (ref 0–18)
BUN BLD-MCNC: 44 MG/DL (ref 9–23)
CALCIUM BLD-MCNC: 9.8 MG/DL (ref 8.7–10.4)
CHLORIDE SERPL-SCNC: 108 MMOL/L (ref 98–112)
CO2 SERPL-SCNC: 31 MMOL/L (ref 21–32)
CREAT BLD-MCNC: 1.74 MG/DL
EGFRCR SERPLBLD CKD-EPI 2021: 39 ML/MIN/1.73M2 (ref 60–?)
FASTING STATUS PATIENT QL REPORTED: NO
GLUCOSE BLD-MCNC: 139 MG/DL (ref 70–99)
OSMOLALITY SERPL CALC.SUM OF ELEC: 303 MOSM/KG (ref 275–295)
POTASSIUM SERPL-SCNC: 4.7 MMOL/L (ref 3.5–5.1)
SODIUM SERPL-SCNC: 140 MMOL/L (ref 136–145)

## 2024-08-05 PROCEDURE — 36415 COLL VENOUS BLD VENIPUNCTURE: CPT

## 2024-08-05 PROCEDURE — 80048 BASIC METABOLIC PNL TOTAL CA: CPT

## 2024-08-07 ENCOUNTER — OFFICE VISIT (OUTPATIENT)
Dept: NEPHROLOGY | Facility: CLINIC | Age: 79
End: 2024-08-07
Payer: MEDICARE

## 2024-08-07 VITALS — SYSTOLIC BLOOD PRESSURE: 136 MMHG | WEIGHT: 185.25 LBS | DIASTOLIC BLOOD PRESSURE: 64 MMHG | BODY MASS INDEX: 28 KG/M2

## 2024-08-07 DIAGNOSIS — I10 ESSENTIAL HYPERTENSION: ICD-10-CM

## 2024-08-07 DIAGNOSIS — N18.30 STAGE 3 CHRONIC KIDNEY DISEASE, UNSPECIFIED WHETHER STAGE 3A OR 3B CKD (HCC): Primary | ICD-10-CM

## 2024-08-07 PROBLEM — J41.0 SMOKERS' COUGH (HCC): Chronic | Status: ACTIVE | Noted: 2024-08-07

## 2024-08-07 PROCEDURE — 99214 OFFICE O/P EST MOD 30 MIN: CPT | Performed by: INTERNAL MEDICINE

## 2024-08-07 NOTE — PROGRESS NOTES
Nephrology Progress Note      Leon Iniguez is a 78 year old male.    HPI:     Chief Complaint   Patient presents with    Chronic Kidney Disease    Hypertension       Leon Iniguez was seen in the nephrology clinic today in follow-up for management of chronic kidney disease stage III in the setting of multiple issues including diabetes, hypertension and HFrEF.  Since his last clinic visit he reports that he underwent a successful TAVR procedure and has been doing quite well overall from a volume standpoint.  That he recently saw cardiology and was told that his echocardiogram was \"excellent\".  He is feeling well and the review of systems is otherwise unremarkable.      HISTORY:  Past Medical History:    Aortic stenosis    Back problem    Cancer (HCC)    CHF (congestive heart failure) (HCC)    Coronary atherosclerosis    Diabetes (HCC)    Essential hypertension    Hearing impairment    High blood pressure    High cholesterol    History of colon cancer    History of hemicolectomy    Odontoid fracture with routine healing    Visual impairment      Past Surgical History:   Procedure Laterality Date    Colectomy      part of colon taken      History reviewed. No pertinent family history.   Social History:   Social History     Socioeconomic History    Marital status:    Tobacco Use    Smoking status: Former     Current packs/day: 0.00     Types: Cigarettes     Quit date: 2/3/1990     Years since quittin.5    Smokeless tobacco: Never   Vaping Use    Vaping status: Never Used   Substance and Sexual Activity    Alcohol use: Not Currently     Comment: every day for years, LAST DRINK 2023    Drug use: Never   Other Topics Concern    Caffeine Concern No    Exercise No     Social Determinants of Health     Food Insecurity: No Food Insecurity (2023)    Received from [facility]    Food Insecurity     Currently or in the past 3 months, have you worried your food would run out before you had  money to buy more?: No     In the past 12 months, have you run out of food or been unable to get more?: No   Transportation Needs: No Transportation Needs (8/29/2023)    Received from [facility]    Transportation Needs     Medical Transportation Needs?: No    Received [facility]        Medications (Active prior to today's visit):  Current Outpatient Medications   Medication Sig Dispense Refill    memantine 5 MG Oral Tab Take 1 tablet (5 mg total) by mouth 2 (two) times daily. 180 tablet 1    carvedilol 12.5 MG Oral Tab Take 1 tablet (12.5 mg total) by mouth 2 (two) times daily with meals.      divalproex  MG Oral Tab EC DR tab Take 1 tablet (250 mg total) by mouth every evening.      OLANZapine 5 MG Oral Tab Take 1 tablet (5 mg total) by mouth nightly.      aspirin 81 MG Oral Tab EC Take 1 tablet (81 mg total) by mouth daily. 30 tablet 11    atorvastatin 40 MG Oral Tab Take 1 tablet (40 mg total) by mouth nightly. 30 tablet 3    clopidogrel 75 MG Oral Tab Take 1 tablet (75 mg total) by mouth daily. 30 tablet 11    sennosides 8.6 MG Oral Tab Take 1 tablet (8.6 mg total) by mouth nightly as needed. (Patient not taking: Reported on 8/7/2024)      Polyethylene Glycol 3350 17 g Oral Powd Pack Take 17 g by mouth daily. (Patient not taking: Reported on 8/7/2024)         Allergies:  Allergies   Allergen Reactions    Lisinopril ANAPHYLAXIS         ROS:     Denies fever/chills  Denies wt loss/gain  Denies HA or visual changes  Denies CP or palpitations  Denies SOB/cough/hemoptysis  Denies abd or flank pain  Denies N/V/D  Denies change in urinary habits or gross hematuria  Denies LE edema  Denies skin rashes/myalgias/arthralgias      PHYSICAL EXAM:   Wt 185 lb 4 oz (84 kg)   BMI 28.17 kg/m²   Wt Readings from Last 6 Encounters:   08/07/24 185 lb 4 oz (84 kg)   05/06/24 185 lb (83.9 kg)   03/06/24 190 lb (86.2 kg)   02/20/24 182 lb (82.6 kg)   02/06/24 183 lb (83  kg)   02/05/24 187 lb (84.8 kg)       General: Alert and oriented in no apparent distress.  HEENT: No scleral icterus, MMM  Neck: Supple, no JENN or thyromegaly  Cardiac: Regular rate and rhythm, S1, S2 normal, no murmur or rub  Lungs: Clear without wheezes, rales, rhonchi.   Extremities: Without clubbing, cyanosis or edema.  Neurologic:  moving all extremities  Skin: Warm and dry, no rashes      LABS:     Lab Results   Component Value Date    BUN 44 (H) 08/05/2024    BUNCREA 29.0 (H) 09/24/2021    CREATSERUM 1.74 (H) 08/05/2024    ANIONGAP 1 08/05/2024    GFR >59 02/03/2010    GFRNAA 46 (L) 09/16/2021    GFRAA 53 (L) 09/16/2021    CA 9.8 08/05/2024    OSMOCALC 303 (H) 08/05/2024    ALKPHO 64 05/06/2024    AST 11 (L) 05/06/2024    ALT 14 (L) 05/06/2024    BILT 0.4 05/06/2024    TP 7.0 05/06/2024    ALB 3.3 (L) 05/06/2024    GLOBULIN 3.7 05/06/2024    AGRATIO 0.9 (L) 02/03/2010     08/05/2024    K 4.7 08/05/2024     08/05/2024    CO2 31.0 08/05/2024     Lab Results   Component Value Date    RBC 3.50 (L) 01/25/2024    HGB 11.3 (L) 01/25/2024    HCT 33.9 (L) 01/25/2024    .0 01/25/2024    MCV 96.9 01/25/2024    MCH 32.3 01/25/2024    MCHC 33.3 01/25/2024    RDW 12.4 01/25/2024    NEPRELIM 3.49 01/25/2024    NEPERCENT 44.5 01/25/2024    LYPERCENT 33.0 01/25/2024    MOPERCENT 8.0 01/25/2024    EOPERCENT 13.4 01/25/2024    BAPERCENT 1.0 01/25/2024    NE 3.49 01/25/2024    LYMABS 2.59 01/25/2024    MOABSO 0.63 01/25/2024    EOABSO 1.05 (H) 01/25/2024    BAABSO 0.08 01/25/2024     Lab Results   Component Value Date    CREUR 70.00 07/25/2023     Lab Results   Component Value Date    CLARITY Clear 08/26/2023    SPECGRAVITY 1.016 08/26/2023    GLUUR Normal 08/26/2023    BILUR Negative 08/26/2023    KETUR Negative 08/26/2023    BLOODURINE Negative 08/26/2023    PHURINE 5.5 08/26/2023    PROUR 70 (A) 08/26/2023    UROBILINOGEN Normal 08/26/2023    NITRITE Negative 08/26/2023    LEUUR Negative 08/26/2023     WBCUR 1-5 08/26/2023    RBCUR 0-2 08/26/2023    EPIUR Few (A) 08/26/2023    BACUR None Seen 08/26/2023    HYLUR Present (A) 08/26/2023     ASSESSMENT/PLAN:     #1.  Chronic kidney disease stage III-he has had longstanding chronic kidney disease in the setting of hypertension as well as prior decreased renal perfusion related to severe aortic stenosis.  Fortunately kidney function is stable and, in fact his creatinine slightly better than it had been in the past.  The mainstay of therapy remains ongoing good blood pressure control which he has been achieving.    #2.  Hypertension-has been mentioned above the blood pressure remains stable on his current dose of carvedilol    Thank you again for allowing me to participate in the care of your patient.  Please do not hesitate to call with any question or concerns.      Mustapha Ramires MD  8/7/2024  1:43 PM

## 2024-12-10 ENCOUNTER — APPOINTMENT (OUTPATIENT)
Dept: LAB | Facility: HOSPITAL | Age: 79
End: 2024-12-10
Attending: INTERNAL MEDICINE
Payer: MEDICARE

## 2024-12-10 DIAGNOSIS — N18.30 STAGE 3 CHRONIC KIDNEY DISEASE, UNSPECIFIED WHETHER STAGE 3A OR 3B CKD (HCC): ICD-10-CM

## 2024-12-10 DIAGNOSIS — I10 ESSENTIAL HYPERTENSION: ICD-10-CM

## 2024-12-10 LAB
ANION GAP SERPL CALC-SCNC: 6 MMOL/L (ref 0–18)
BUN BLD-MCNC: 33 MG/DL (ref 9–23)
CALCIUM BLD-MCNC: 10 MG/DL (ref 8.7–10.4)
CHLORIDE SERPL-SCNC: 113 MMOL/L (ref 98–112)
CO2 SERPL-SCNC: 25 MMOL/L (ref 21–32)
CREAT BLD-MCNC: 1.63 MG/DL
EGFRCR SERPLBLD CKD-EPI 2021: 42 ML/MIN/1.73M2 (ref 60–?)
FASTING STATUS PATIENT QL REPORTED: YES
GLUCOSE BLD-MCNC: 106 MG/DL (ref 70–99)
OSMOLALITY SERPL CALC.SUM OF ELEC: 306 MOSM/KG (ref 275–295)
POTASSIUM SERPL-SCNC: 4.9 MMOL/L (ref 3.5–5.1)
SODIUM SERPL-SCNC: 144 MMOL/L (ref 136–145)

## 2024-12-10 PROCEDURE — 36415 COLL VENOUS BLD VENIPUNCTURE: CPT

## 2024-12-10 PROCEDURE — 80048 BASIC METABOLIC PNL TOTAL CA: CPT

## 2025-03-11 ENCOUNTER — HOSPITAL ENCOUNTER (INPATIENT)
Facility: HOSPITAL | Age: 80
LOS: 2 days | Discharge: HOME HEALTH | End: 2025-03-13
Attending: EMERGENCY MEDICINE | Admitting: HOSPITALIST
Payer: MEDICARE

## 2025-03-11 DIAGNOSIS — K92.2 GASTROINTESTINAL HEMORRHAGE, UNSPECIFIED GASTROINTESTINAL HEMORRHAGE TYPE: Primary | ICD-10-CM

## 2025-03-11 DIAGNOSIS — G30.9 MILD ALZHEIMER'S DEMENTIA WITHOUT BEHAVIORAL DISTURBANCE, PSYCHOTIC DISTURBANCE, MOOD DISTURBANCE, OR ANXIETY, UNSPECIFIED TIMING OF DEMENTIA ONSET (HCC): ICD-10-CM

## 2025-03-11 DIAGNOSIS — R79.89 AZOTEMIA: ICD-10-CM

## 2025-03-11 DIAGNOSIS — E11.65 TYPE 2 DIABETES MELLITUS WITH HYPERGLYCEMIA, UNSPECIFIED WHETHER LONG TERM INSULIN USE (HCC): ICD-10-CM

## 2025-03-11 DIAGNOSIS — F02.A0 MILD ALZHEIMER'S DEMENTIA WITHOUT BEHAVIORAL DISTURBANCE, PSYCHOTIC DISTURBANCE, MOOD DISTURBANCE, OR ANXIETY, UNSPECIFIED TIMING OF DEMENTIA ONSET (HCC): ICD-10-CM

## 2025-03-11 DIAGNOSIS — I50.9 CHRONIC CONGESTIVE HEART FAILURE, UNSPECIFIED HEART FAILURE TYPE (HCC): ICD-10-CM

## 2025-03-11 DIAGNOSIS — D64.9 ANEMIA, UNSPECIFIED TYPE: ICD-10-CM

## 2025-03-11 LAB
ALBUMIN SERPL-MCNC: 3.8 G/DL (ref 3.2–4.8)
ALBUMIN/GLOB SERPL: 1.4 {RATIO} (ref 1–2)
ALP LIVER SERPL-CCNC: 53 U/L
ALT SERPL-CCNC: <7 U/L
ANION GAP SERPL CALC-SCNC: 11 MMOL/L (ref 0–18)
ANTIBODY SCREEN: NEGATIVE
APTT PPP: 25.3 SECONDS (ref 23–36)
AST SERPL-CCNC: 12 U/L (ref ?–34)
BASOPHILS # BLD AUTO: 0.07 X10(3) UL (ref 0–0.2)
BASOPHILS NFR BLD AUTO: 0.5 %
BILIRUB SERPL-MCNC: 0.3 MG/DL (ref 0.2–1.1)
BUN BLD-MCNC: 85 MG/DL (ref 9–23)
CALCIUM BLD-MCNC: 9.9 MG/DL (ref 8.7–10.6)
CHLORIDE SERPL-SCNC: 108 MMOL/L (ref 98–112)
CO2 SERPL-SCNC: 21 MMOL/L (ref 21–32)
CREAT BLD-MCNC: 1.74 MG/DL
EGFRCR SERPLBLD CKD-EPI 2021: 39 ML/MIN/1.73M2 (ref 60–?)
EOSINOPHIL # BLD AUTO: 0.1 X10(3) UL (ref 0–0.7)
EOSINOPHIL NFR BLD AUTO: 0.7 %
ERYTHROCYTE [DISTWIDTH] IN BLOOD BY AUTOMATED COUNT: 13.2 %
EST. AVERAGE GLUCOSE BLD GHB EST-MCNC: 117 MG/DL (ref 68–126)
GLOBULIN PLAS-MCNC: 2.8 G/DL (ref 2–3.5)
GLUCOSE BLD-MCNC: 224 MG/DL (ref 70–99)
HBA1C MFR BLD: 5.7 % (ref ?–5.7)
HCT VFR BLD AUTO: 27.2 %
HGB BLD-MCNC: 7.8 G/DL
HGB BLD-MCNC: 9.3 G/DL
IMM GRANULOCYTES # BLD AUTO: 0.06 X10(3) UL (ref 0–1)
IMM GRANULOCYTES NFR BLD: 0.4 %
INR BLD: 1.14 (ref 0.8–1.2)
LYMPHOCYTES # BLD AUTO: 1.83 X10(3) UL (ref 1–4)
LYMPHOCYTES NFR BLD AUTO: 13.7 %
MCH RBC QN AUTO: 33.5 PG (ref 26–34)
MCHC RBC AUTO-ENTMCNC: 34.2 G/DL (ref 31–37)
MCV RBC AUTO: 97.8 FL
MONOCYTES # BLD AUTO: 0.75 X10(3) UL (ref 0.1–1)
MONOCYTES NFR BLD AUTO: 5.6 %
NEUTROPHILS # BLD AUTO: 10.59 X10 (3) UL (ref 1.5–7.7)
NEUTROPHILS # BLD AUTO: 10.59 X10(3) UL (ref 1.5–7.7)
NEUTROPHILS NFR BLD AUTO: 79.1 %
OSMOLALITY SERPL CALC.SUM OF ELEC: 323 MOSM/KG (ref 275–295)
PLATELET # BLD AUTO: 247 10(3)UL (ref 150–450)
POTASSIUM SERPL-SCNC: 4.5 MMOL/L (ref 3.5–5.1)
PROT SERPL-MCNC: 6.6 G/DL (ref 5.7–8.2)
PROTHROMBIN TIME: 14.4 SECONDS (ref 11.6–14.8)
RBC # BLD AUTO: 2.78 X10(6)UL
RH BLOOD TYPE: POSITIVE
SODIUM SERPL-SCNC: 140 MMOL/L (ref 136–145)
WBC # BLD AUTO: 13.4 X10(3) UL (ref 4–11)

## 2025-03-11 PROCEDURE — 99223 1ST HOSP IP/OBS HIGH 75: CPT | Performed by: INTERNAL MEDICINE

## 2025-03-11 RX ORDER — BENZONATATE 100 MG/1
200 CAPSULE ORAL 3 TIMES DAILY PRN
Status: DISCONTINUED | OUTPATIENT
Start: 2025-03-11 | End: 2025-03-13

## 2025-03-11 RX ORDER — SODIUM PHOSPHATE, DIBASIC AND SODIUM PHOSPHATE, MONOBASIC 7; 19 G/230ML; G/230ML
1 ENEMA RECTAL ONCE AS NEEDED
Status: DISCONTINUED | OUTPATIENT
Start: 2025-03-11 | End: 2025-03-13

## 2025-03-11 RX ORDER — SODIUM CHLORIDE 9 MG/ML
INJECTION, SOLUTION INTRAVENOUS ONCE
Status: COMPLETED | OUTPATIENT
Start: 2025-03-11 | End: 2025-03-11

## 2025-03-11 RX ORDER — ECHINACEA PURPUREA EXTRACT 125 MG
1 TABLET ORAL
Status: DISCONTINUED | OUTPATIENT
Start: 2025-03-11 | End: 2025-03-13

## 2025-03-11 RX ORDER — ONDANSETRON 2 MG/ML
4 INJECTION INTRAMUSCULAR; INTRAVENOUS EVERY 4 HOURS PRN
Status: ACTIVE | OUTPATIENT
Start: 2025-03-11 | End: 2025-03-11

## 2025-03-11 RX ORDER — DIVALPROEX SODIUM 250 MG/1
250 TABLET, DELAYED RELEASE ORAL EVERY EVENING
Status: DISCONTINUED | OUTPATIENT
Start: 2025-03-11 | End: 2025-03-13

## 2025-03-11 RX ORDER — POLYETHYLENE GLYCOL 3350 17 G/17G
17 POWDER, FOR SOLUTION ORAL DAILY PRN
Status: DISCONTINUED | OUTPATIENT
Start: 2025-03-11 | End: 2025-03-13

## 2025-03-11 RX ORDER — OLANZAPINE 2.5 MG/1
5 TABLET, FILM COATED ORAL NIGHTLY
Status: DISCONTINUED | OUTPATIENT
Start: 2025-03-11 | End: 2025-03-13

## 2025-03-11 RX ORDER — MEMANTINE HYDROCHLORIDE 5 MG/1
5 TABLET ORAL 2 TIMES DAILY
Status: DISCONTINUED | OUTPATIENT
Start: 2025-03-11 | End: 2025-03-13

## 2025-03-11 RX ORDER — SENNOSIDES 8.6 MG
17.2 TABLET ORAL NIGHTLY PRN
Status: DISCONTINUED | OUTPATIENT
Start: 2025-03-11 | End: 2025-03-13

## 2025-03-11 RX ORDER — CARVEDILOL 12.5 MG/1
12.5 TABLET ORAL 2 TIMES DAILY WITH MEALS
Status: DISCONTINUED | OUTPATIENT
Start: 2025-03-11 | End: 2025-03-13

## 2025-03-11 RX ORDER — SODIUM CHLORIDE 9 MG/ML
INJECTION, SOLUTION INTRAVENOUS CONTINUOUS
Status: ACTIVE | OUTPATIENT
Start: 2025-03-11 | End: 2025-03-11

## 2025-03-11 RX ORDER — BISACODYL 10 MG
10 SUPPOSITORY, RECTAL RECTAL
Status: DISCONTINUED | OUTPATIENT
Start: 2025-03-11 | End: 2025-03-13

## 2025-03-11 RX ORDER — ONDANSETRON 2 MG/ML
4 INJECTION INTRAMUSCULAR; INTRAVENOUS EVERY 6 HOURS PRN
Status: DISCONTINUED | OUTPATIENT
Start: 2025-03-11 | End: 2025-03-13

## 2025-03-11 RX ORDER — ACETAMINOPHEN 500 MG
1000 TABLET ORAL EVERY 8 HOURS PRN
Status: DISCONTINUED | OUTPATIENT
Start: 2025-03-11 | End: 2025-03-13

## 2025-03-11 RX ORDER — ATORVASTATIN CALCIUM 40 MG/1
40 TABLET, FILM COATED ORAL NIGHTLY
Status: DISCONTINUED | OUTPATIENT
Start: 2025-03-11 | End: 2025-03-13

## 2025-03-11 NOTE — ED PROVIDER NOTES
Patient Seen in: [facility]      History     Chief Complaint   Patient presents with    Dizziness    GI Bleeding     Stated Complaint: dizziness  and diarrhea states it black    Subjective:   HPI      79-year-old male presents to the emergency department with family for evaluation after the patient had several loose to watery black-colored stools today.  Patient also stated that he felt weak and lightheaded when standing.  Some abdominal cramping associated with diarrhea but no nausea or vomiting.  Past surgical history is remarkable for colon resection for abnormal polyp.  No chest pain, difficulty breathing or loss of consciousness today.  Patient is on aspirin and Plavix because of several coronary stents.  He does use ibuprofen as needed for joint pains.    Objective:     Past Medical History:    Aortic stenosis    Back problem    Cancer (HCC)    CHF (congestive heart failure) (HCC)    Coronary atherosclerosis    Diabetes (HCC)    Essential hypertension    Hearing impairment    High blood pressure    High cholesterol    History of colon cancer    History of hemicolectomy    Odontoid fracture with routine healing    Visual impairment              Past Surgical History:   Procedure Laterality Date    Colectomy      part of colon taken                Social History     Socioeconomic History    Marital status:    Tobacco Use    Smoking status: Former     Current packs/day: 0.00     Types: Cigarettes     Quit date: 2/3/1990     Years since quittin.1    Smokeless tobacco: Never   Vaping Use    Vaping status: Never Used   Substance and Sexual Activity    Alcohol use: Not Currently     Comment: every day for years, LAST DRINK 2023    Drug use: Never   Other Topics Concern    Caffeine Concern No    Exercise No     Social Drivers of Health     Food Insecurity: No Food Insecurity (3/11/2025)    NCSS - Food Insecurity     Worried About Running Out of Food in the Last Year: No     Ran Out of  Food in the Last Year: No   Transportation Needs: Unmet Transportation Needs (3/11/2025)    NCSS - Transportation     Lack of Transportation: Yes   Housing Stability: Not At Risk (3/11/2025)    NCSS - Housing/Utilities     Has Housing: Yes     Worried About Losing Housing: No     Unable to Get Utilities: No                  Physical Exam     ED Triage Vitals [03/11/25 1105]   /62   Pulse 85   Resp 16   Temp 97.8 °F (36.6 °C)   Temp src    SpO2 100 %   O2 Device None (Room air)       Current Vitals:   Vital Signs  BP: 152/64  Pulse: 74  Resp: 16  Temp: 97.8 °F (36.6 °C)    Oxygen Therapy  SpO2: 100 %  O2 Device: None (Room air)        Physical Exam     General Appearance: This is an older male sitting on a gurney.  Vital signs were reviewed per nurses notes.  HEENT: Normocephalic/atraumatic.  Anicteric sclera.  Oral mucosa is moist.  Oropharynx is normal.  Neck: No adenopathy or thyromegaly.  Lungs are clear to auscultation.  Heart exam: Normal S1-S2 without extra sounds or murmurs.  Regular rate and rhythm.  Abdomen is soft and nontender without masses or rebound.  There is black stool near the anus which was Hemoccult to strongly positive.  Skin is dry without rashes or lesions.  Extremities are atraumatic.  Neuroexam: Awake, conversive and moving all 4 extremities well.  ED Course     Labs Reviewed   CBC WITH DIFFERENTIAL WITH PLATELET - Abnormal; Notable for the following components:       Result Value    WBC 13.4 (*)     RBC 2.78 (*)     HGB 9.3 (*)     HCT 27.2 (*)     Neutrophil Absolute Prelim 10.59 (*)     Neutrophil Absolute 10.59 (*)     All other components within normal limits   COMP METABOLIC PANEL (14) - Abnormal; Notable for the following components:    Glucose 224 (*)     BUN 85 (*)     Creatinine 1.74 (*)     Calculated Osmolality 323 (*)     eGFR-Cr 39 (*)     ALT <7 (*)     All other components within normal limits   PROTHROMBIN TIME (PT) - Normal   PTT, ACTIVATED - Normal   TYPE AND SCREEN     Narrative:     The following orders were created for panel order Type and screen.  Procedure                               Abnormality         Status                     ---------                               -----------         ------                     ABORH (Blood Type)[[id number]]                               Final result               Antibody Screen[[id number]]                                  Final result                 Please view results for these tests on the individual orders.   ABORH (BLOOD TYPE)   ANTIBODY SCREEN   RAINBOW DRAW LAVENDER   RAINBOW DRAW LIGHT GREEN   RAINBOW DRAW LAVENDER   RAINBOW DRAW LIGHT GREEN   C. DIFFICILE(TOXIGENIC)PCR            Intravenous access was obtained.  Laboratory studies were drawn.  Type and screen was ordered.    Intravenous Protonix was administered.    EMR was reviewed and the patient's last hemoglobin was a year ago and was 11.  That indicates a 2 point drop in hemoglobin.  BUN is also elevated at 85 with a creatinine of 1.74.    [place] hospitalist on-call was contacted via [employer] as well as [facility].  Arrangements were made for transfer to [facility] by ambulance.  Test results and treatment plan were discussed with the patient and family prior to transfer.       MDM      #1.  GI bleed.  2.  Anemia.  Likely has severe erosive gastritis or peptic ulcer disease related to NSAID use.  3.  Azotemia secondary to #1.  4.  Type 2 diabetes mellitus.  5.  Mild Alzheimer's dementia.  6.  Congestive heart failure.    Admission disposition: 3/11/2025 11:58 AM           MDM    Disposition and Plan     Clinical Impression:  1. Gastrointestinal hemorrhage, unspecified gastrointestinal hemorrhage type    2. Anemia, unspecified type    3. Azotemia    4. Type 2 diabetes mellitus with hyperglycemia, unspecified whether long term insulin use (HCC)    5. Mild Alzheimer's dementia without behavioral disturbance, psychotic disturbance, mood disturbance, or anxiety,  unspecified timing of dementia onset (HCC)    6. Chronic congestive heart failure, unspecified heart failure type (HCC)         Disposition:  Admit  3/11/2025 11:58 am    Follow-up:  No follow-up provider specified.        Medications Prescribed:  Current Discharge Medication List              Supplementary Documentation:         Hospital Problems       Present on Admission  Date Reviewed: 8/7/2024            ICD-10-CM Noted POA    * (Principal) Gastrointestinal hemorrhage, unspecified gastrointestinal hemorrhage type K92.2 3/11/2025 Unknown    Anemia, unspecified type D64.9 3/11/2025 Unknown    Azotemia R79.89 3/11/2025 Unknown    Chronic congestive heart failure, unspecified heart failure type (HCC) I50.9 3/11/2025 Unknown    Mild Alzheimer's dementia without behavioral disturbance, psychotic disturbance, mood disturbance, or anxiety, unspecified timing of dementia onset ([facility]) G30.9, F02.A0 3/11/2025 Unknown    Type 2 diabetes mellitus with hyperglycemia, unspecified whether long term insulin use ([facility]) E11.65 3/11/2025 Unknown

## 2025-03-11 NOTE — ED INITIAL ASSESSMENT (HPI)
Pt reports coming in due to being lightheaded and having dark stools. Also reports intermittent headaches and abdominal pain.

## 2025-03-11 NOTE — ED QUICK NOTES
Orders for admission, patient is aware of plan and ready to go upstairs. Any questions, please call ED RN Cam  at extension [phone].     Vaccinated? Yes   Type of COVID test sent: none  COVID Suspicion level: Low      Titratable drug(s) infusin.9 NS  Rate: 125 ml/hr    LOC at time of transport: A+Ox3    Other pertinent information: none    CIWA score= n/a  NIH score=  n/a

## 2025-03-11 NOTE — H&P
[facility]     Leon Iniguez Patient Status:  Inpatient    1945 MRN [id number]   Location [facility] 3NE-A Attending Onofre Newby, DO   Hosp Day # 0 PCP Juan Jose Perez MD     Chief Complaint: Lightheaded, dark stools    Subjective:    History of Present Illness:     Leon Iniguez is a 79 year old male with past medical history of congestive heart failure, coronary artery disease presented to the emergency department with lightheadedness and dark stools.    Patient reportedly with watery and dark stools, he denies any abdominal pain, he became lightheaded and presented to the emergency department, patient has a history of coronary artery disease on dual antiplatelet therapy with aspirin and Plavix.  No chest pain or difficulty breathing.    History/Other:    Past Medical History:  Past Medical History:    Aortic stenosis    Back problem    Cancer (HCC)    CHF (congestive heart failure) (HCC)    Coronary atherosclerosis    Diabetes (HCC)    Essential hypertension    Hearing impairment    High blood pressure    High cholesterol    History of colon cancer    History of hemicolectomy    Odontoid fracture with routine healing    Visual impairment     Past Surgical History:   Past Surgical History:   Procedure Laterality Date    Colectomy      part of colon taken      Family History:   No family history on file.  Social History:    reports that he quit smoking about 35 years ago. His smoking use included cigarettes. He has never used smokeless tobacco. He reports that he does not currently use alcohol. He reports that he does not use drugs.     Allergies: Allergies[1]    Medications:  Medications Ordered Prior to Encounter[2]    Review of Systems:   A comprehensive review of systems was completed.    Pertinent positives and negatives noted in the HPI.    Objective:   Physical Exam:    /64   Pulse 74   Temp 97.8 °F (36.6 °C)   Resp 16   Ht 5' 8\" (1.727 m)   Wt 188 lb (85.3  kg)   SpO2 100%   BMI 28.59 kg/m²   General: No acute distress, Alert  Respiratory: No rhonchi, no wheezes  Cardiovascular: S1, S2. Regular rate and rhythm  Abdomen: Soft, Non-tender, non-distended, positive bowel sounds  Neuro: No new focal deficits  Extremities: No edema      Results:    Labs:      Labs Last 24 Hours:    Recent Labs   Lab 03/11/25  1111   RBC 2.78*   HGB 9.3*   HCT 27.2*   MCV 97.8   MCH 33.5   MCHC 34.2   RDW 13.2   NEPRELIM 10.59*   WBC 13.4*   .0       Recent Labs   Lab 03/11/25  1111   *   BUN 85*   CREATSERUM 1.74*   EGFRCR 39*   CA 9.9   ALB 3.8      K 4.5      CO2 21.0   ALKPHO 53   AST 12   ALT <7*   BILT 0.3   TP 6.6       Estimated Glomerular Filtration Rate: 39 mL/min/1.73m2 (A) (result from lab).    Lab Results   Component Value Date    INR 1.14 03/11/2025    INR 1.24 (H) 05/26/2023    INR 1.27 (H) 05/25/2023       No results for input(s): \"TROP\", \"TROPHS\", \"CK\" in the last 168 hours.    No results for input(s): \"TROP\", \"PBNP\" in the last 168 hours.    No results for input(s): \"PCT\" in the last 168 hours.    Imaging: Imaging data reviewed in Epic.    Assessment & Plan:      #ABLA 2/2 GIB  -Serial H/H  -Transfuse for maintain hb >7  -GI on consult  -PPI BID    #Chronic HFrEF  -only on beta blocker     #CKD III  -near baseline      #Severe Aortic Stenosis s/p TAVR    #Hyperglycemia, hx of DM does not appear to be on any medication currently   -check A1c    #HTN  -Carvedilol    #CAD s/p PCI  -hold DAPT  -Beta blocker, Statin     #Dementia  -PTA Namenda, Zyprexa, Depkaote                 Onofre Newby, DO    Supplementary Documentation:     The 21st Century Cures Act makes medical notes like these available to patients in the interest of transparency. Please be advised this is a medical document. Medical documents are intended to carry relevant information, facts as evident, and the clinical opinion of the practitioner. The medical note is intended as peer to  peer communication and may appear blunt or direct. It is written in medical language and may contain abbreviations or verbiage that are unfamiliar.               **Certification      PHYSICIAN Certification of Need for Inpatient Hospitalization - Initial Certification    Patient will require inpatient services that will reasonably be expected to span two midnight's based on the clinical documentation in H+P.   Based on patients current state of illness, I anticipate that, after discharge, patient will require TBD.                        [1]   Allergies  Allergen Reactions    Lisinopril ANAPHYLAXIS   [2]   No current facility-administered medications on file prior to encounter.     Current Outpatient Medications on File Prior to Encounter   Medication Sig Dispense Refill    MEMANTINE 5 MG Oral Tab TAKE ONE TABLET BY MOUTH TWICE DAILY 180 tablet 1    carvedilol 12.5 MG Oral Tab Take 1 tablet (12.5 mg total) by mouth 2 (two) times daily with meals.      Polyethylene Glycol 3350 17 g Oral Powd Pack Take 17 g by mouth at bedtime.      divalproex  MG Oral Tab EC DR tab Take 1 tablet (250 mg total) by mouth every evening.      OLANZapine 5 MG Oral Tab Take 1 tablet (5 mg total) by mouth nightly.      aspirin 81 MG Oral Tab EC Take 1 tablet (81 mg total) by mouth daily. 30 tablet 11    atorvastatin 40 MG Oral Tab Take 1 tablet (40 mg total) by mouth nightly. 30 tablet 3    clopidogrel 75 MG Oral Tab Take 1 tablet (75 mg total) by mouth daily. 30 tablet 11

## 2025-03-11 NOTE — CONSULTS
[facility]                       Gastroenterology Consultation-[facility]    Leon Iniguez Patient Status:  Inpatient    1945 MRN [id number]   Location [facility] 3NE-A Attending Leon Sterling MD   Hosp Day # 0 PCP Juan Jose Perez MD     Reason for consultation: Melena  HPI: This is an 79 yr-old male with a PMHx that includes CAD s/p PCI (; ASA + Plavix--last dose 3/10), aortic stenosis s/p TAVR, HF, DM, CKD, dementia, and colon cancer (Dx 25-30 years ago) s/p colon resection who presented to ER today with dizziness in the setting of black tarry stools.  Patient reports being in his usual state of health until this morning when he suddenly passed black loose/watery stools.  He reports several episodes this morning which progressed to weakness and dizziness with standing.  Patient denies nausea, vomiting, dysphagia, odynophagia, and abdominal pain.  He reports chronic heartburn which requires sporadic Rolaids--no recent increase in symptoms.  No diarrhea prior to onset of symptoms and he denies a prior history of overt GI bleeding.  Patient is on Plavix plus ASA and reports using ibuprofen 200 mg daily to help aid with various aches/pains prior to sleep.  No change in appetite and he denies fluctuations in weight or early satiety.  Patient denies undergoing an EGD and last colonoscopy was prior to colon cancer surgery.  No new imaging; labs with Hgb 9.3 (MCV 98) from a baseline of 10-11 over the last year, normal platelets, normal INR 1.14, and creatinine at baseline however BUN more than doubled compared to baseline (85 from 33).  No overt GI bleeding since arrival the patient currently denies chest pain or dyspnea  PMHx:   Past Medical History:    Aortic stenosis    Back problem    Cancer (HCC)    CHF (congestive heart failure) (HCC)    Coronary atherosclerosis    Diabetes (HCC)    Essential hypertension    Hearing impairment    High blood pressure    High cholesterol    History of  colon cancer    History of hemicolectomy    Odontoid fracture with routine healing    Visual impairment                PSHx:   Past Surgical History:   Procedure Laterality Date    Colectomy      part of colon taken     Medications:    [COMPLETED] pantoprazole (Protonix) 80 mg in sodium chloride 0.9% PF 20 mL IV push  80 mg Intravenous Once    [COMPLETED] sodium chloride 0.9% infusion   Intravenous Once    sodium chloride 0.9% infusion   Intravenous Continuous    ondansetron (Zofran) 4 MG/2ML injection 4 mg  4 mg Intravenous Q4H PRN    pantoprazole (Protonix) 40 mg in sodium chloride 0.9% PF 10 mL IV push  40 mg Intravenous Q12H     Allergies: Allergies[1]  Social HX:   Social History     Socioeconomic History    Marital status:    Tobacco Use    Smoking status: Former     Current packs/day: 0.00     Types: Cigarettes     Quit date: 2/3/1990     Years since quittin.1    Smokeless tobacco: Never   Vaping Use    Vaping status: Never Used   Substance and Sexual Activity    Alcohol use: Not Currently     Comment: every day for years, LAST DRINK 2023    Drug use: Never   Other Topics Concern    Caffeine Concern No    Exercise No     Social Drivers of Health     Food Insecurity: No Food Insecurity (3/11/2025)    NCSS - Food Insecurity     Worried About Running Out of Food in the Last Year: No     Ran Out of Food in the Last Year: No   Transportation Needs: Unmet Transportation Needs (3/11/2025)    NCSS - Transportation     Lack of Transportation: Yes   Housing Stability: Not At Risk (3/11/2025)    NCSS - Housing/Utilities     Has Housing: Yes     Worried About Losing Housing: No     Unable to Get Utilities: No      FamHx: The patient has no family history of colon cancer or other gastrointestinal malignancies;  No family history of ulcer disease, or inflammatory bowel disease  ROS:  In addition to the pertinent positives described above:            Infectious Disease:  No chronic infections or recent  fevers prior to the acute illness            Cardiovascular: + aortic stenosis s/p TAVR, CAD s/p PCI with stenting (2023), HF, dyslipidemia             Respiratory: No shortness of breath, asthma, copd, recurrent pneumonia            Hematologic: The patient reports no easy bruising, frequent gum bleeding or nose bleeding;  The patient has no history of known chronic anemia            Dermatologic: The patient reports no recent rashes or chronic skin disorders            Rheumatologic: The patient reports no history of chronic arthritis, myalgias, arthralgias            Genitourinary:  The patient reports no history of recurrent urinary tract infections, hematuria, dysuria, or nephrolithiasis           Psychiatric: + dementia            Oncologic: + colon cancer (dx 25-30 yrs ago) s/p surgical resection            ENT: + hearing loss           Neurologic: The patient reports no history of seizure, stroke, or frequent headaches  PE: /64   Pulse 74   Temp 97.8 °F (36.6 °C)   Resp 16   Ht 5' 8\" (1.727 m)   Wt 188 lb (85.3 kg)   SpO2 100%   BMI 28.59 kg/m²   Gen: AAO x 3, able to speak in complete sentences  HENT: EOMI, PERRL, oropharynx is clear with moist mucosal membranes  Eyes: Sclerae are anicteric  Neck:  Supple without nuchal rigidity  CV: Regular rate and rhythm, with normal S1 and S2  Resp: Clear to auscultation bilaterally without wheezes; rubs, rhonchi, or rales  Abdomen: Soft, non-tender, non-distended with the presence of bowel sounds; No hepatosplenomegaly; no rebound or guarding; No ascites is clinically apparent; no tympany to percussion  Ext: No peripheral edema or cyanosis  Skin: Warm and dry  Psychiatric: Appropriate mood and congruent affect without obvious depression or anxiety  Labs:   Lab Results   Component Value Date    WBC 13.4 03/11/2025    HGB 9.3 03/11/2025    HCT 27.2 03/11/2025    .0 03/11/2025    CREATSERUM 1.74 03/11/2025    BUN 85 03/11/2025     03/11/2025    K  4.5 03/11/2025     03/11/2025    CO2 21.0 03/11/2025     03/11/2025    CA 9.9 03/11/2025    ALB 3.8 03/11/2025    ALKPHO 53 03/11/2025    BILT 0.3 03/11/2025    AST 12 03/11/2025    ALT <7 03/11/2025    PTT 25.3 03/11/2025    INR 1.14 03/11/2025    PTP 14.4 03/11/2025     Recent Labs   Lab 03/11/25  1111   *   BUN 85*   CREATSERUM 1.74*   CA 9.9      K 4.5      CO2 21.0     Recent Labs   Lab 03/11/25  1111   RBC 2.78*   HGB 9.3*   HCT 27.2*   MCV 97.8   MCH 33.5   MCHC 34.2   RDW 13.2   NEPRELIM 10.59*   WBC 13.4*   .0       Recent Labs   Lab 03/11/25  1111   ALT <7*   AST 12       Impression: 79 yr-old male with a PMHx that includes CAD s/p PCI (2023; ASA + Plavix--last dose 3/10), aortic stenosis s/p TAVR, HF, DM, CKD, dementia, and colon cancer (Dx 25-30 years ago) s/p colon resection who presented to ER today with dizziness in the setting of black tarry stools. Hgb 9.3 (MCV 98) from a baseline of 10-11 over the last year, normal platelets, normal INR 1.14, and creatinine at baseline however BUN more than doubled compared to baseline (85 from 33). + daily NSAIDs (>months) in addition to ASA + Plavix.   Symptoms most likely from an upper GI bleed such as ulcerations, AVMS, with IBD and neoplasm less likely.  Will plan for treatment with PPI IV twice daily and plans for EGD in a.m. to assess for sources of bleeding.  If no source of bleeding noted would recommend an inpatient colonoscopy especially given history of colon cancer--if EGD does reveal a source for bleeding/anemia can plan for colonoscopy in the outpatient setting once Plavix has been held for 5 days  The risks, benefits, alternatives of the procedure including the risks of anesthesia, bleeding, perforation, missed lesions, need for surgery, and infection were discussed with the patient and his son (Ramses) at bedside. Both expressed understanding of the risks and are agreeable to proceed.    Recommendations:      Plan for EGD in AM under MAC with Dr Gannon; if no cause for bleeding noted then plan for an inpatient colonoscopy  Clear liquid diet today; NPO after midnight with sips of water for necessary medications   Protonix 40 mg IV BID   Please hold Plavix and non-ASA NSAIDs if the risk remains acceptable  Stool for C Diff + GI PCR panel; enteric isolation   Serial Hgb monitoring transfusing as needed to maintain Hgb >7-8; if patient requires transfusion diuretics per hospitalist/cardiology recommendations  Repeat CBC, BMP, Mg in AM correcting electrolytes per Hospitalist recommendations     Thank you for the consultation, we will follow the patient with you.  Attending addendum (Dr Gannon) to follow later today and provide formal, final recommendations at that time   JULIOCESAR Salazar  2:59 PM  3/11/2025  [facility]  [phone]    GI attending addendum:    I have personally seen and examined this patient and agree with above nurse practitioner's assessment and recommendations.     Briefly, patient is an 79-year-old male with chronic medical conditions including coronary artery disease status post stenting in 2023 currently on aspirin and Plavix with last dose this morning, aortic stenosis status post TAVR, and colon cancer diagnosed 25 to 30 years ago status post colon resection that presented to the ER today with dizziness and melena.  Patient notes that started this morning with several loose black stools.  He then became weak and dizzy so came to the emergency room.  He does take ibuprofen on a daily basis and has been doing this for at least the last 3 months.  Hemoglobin on admission was 9.3.  Platelet count and INR normal.  BUN is elevated.  On physical exam, patient is resting comfortably in bed.  His abdomen is soft, nontender, and nondistended.  Patient with melena and weakness.  Suspect upper GI bleed especially in the setting of aspirin, Plavix, and ibuprofen use.  Will plan for  EGD tomorrow for further evaluation.  This will likely be diagnostic only given Plavix use, however if there is significant bleeding noted during the endoscopy, can consider clipping versus Hemospray and may need relook once Plavix has been held for at least 5 days.  Continue to monitor his hemoglobin and transfuse for hemoglobin less than 7.  Pantoprazole 40 mg IV twice daily.  Recommend holding Plavix if okay with primary.  Avoid NSAIDs.  Pending results of EGD, patient may need inpatient colonoscopy if no obvious etiology is noted on EGD.  Otherwise, would recommend outpatient colonoscopy especially given his history of colon cancer and has not had a colonoscopy since his surgical resection 25 years ago.  Thank you for the consultation.  Will continue to follow along with you.    Benito Gannon,   10:13 PM  3/11/2025  [facility]  [phone]           [1]   Allergies  Allergen Reactions    Lisinopril ANAPHYLAXIS

## 2025-03-12 ENCOUNTER — ANESTHESIA EVENT (OUTPATIENT)
Dept: ENDOSCOPY | Facility: HOSPITAL | Age: 80
End: 2025-03-12
Payer: MEDICARE

## 2025-03-12 ENCOUNTER — ANESTHESIA (OUTPATIENT)
Dept: ENDOSCOPY | Facility: HOSPITAL | Age: 80
End: 2025-03-12
Payer: MEDICARE

## 2025-03-12 LAB
ANION GAP SERPL CALC-SCNC: 6 MMOL/L (ref 0–18)
BASOPHILS # BLD AUTO: 0.06 X10(3) UL (ref 0–0.2)
BASOPHILS NFR BLD AUTO: 0.5 %
BUN BLD-MCNC: 79 MG/DL (ref 9–23)
CALCIUM BLD-MCNC: 9 MG/DL (ref 8.7–10.6)
CHLORIDE SERPL-SCNC: 110 MMOL/L (ref 98–112)
CO2 SERPL-SCNC: 25 MMOL/L (ref 21–32)
CREAT BLD-MCNC: 1.89 MG/DL
EGFRCR SERPLBLD CKD-EPI 2021: 35 ML/MIN/1.73M2 (ref 60–?)
EOSINOPHIL # BLD AUTO: 0.23 X10(3) UL (ref 0–0.7)
EOSINOPHIL NFR BLD AUTO: 2.1 %
ERYTHROCYTE [DISTWIDTH] IN BLOOD BY AUTOMATED COUNT: 13.5 %
GLUCOSE BLD-MCNC: 106 MG/DL (ref 70–99)
HCT VFR BLD AUTO: 19.7 %
HGB BLD-MCNC: 6.8 G/DL
HGB BLD-MCNC: 6.8 G/DL
HGB BLD-MCNC: 7 G/DL
HGB BLD-MCNC: 7.8 G/DL
HGB BLD-MCNC: 8.5 G/DL
IMM GRANULOCYTES # BLD AUTO: 0.04 X10(3) UL (ref 0–1)
IMM GRANULOCYTES NFR BLD: 0.4 %
LYMPHOCYTES # BLD AUTO: 3.03 X10(3) UL (ref 1–4)
LYMPHOCYTES NFR BLD AUTO: 27.5 %
MAGNESIUM SERPL-MCNC: 2.1 MG/DL (ref 1.6–2.6)
MCH RBC QN AUTO: 34.2 PG (ref 26–34)
MCHC RBC AUTO-ENTMCNC: 34.5 G/DL (ref 31–37)
MCV RBC AUTO: 99 FL
MONOCYTES # BLD AUTO: 0.89 X10(3) UL (ref 0.1–1)
MONOCYTES NFR BLD AUTO: 8.1 %
NEUTROPHILS # BLD AUTO: 6.76 X10 (3) UL (ref 1.5–7.7)
NEUTROPHILS # BLD AUTO: 6.76 X10(3) UL (ref 1.5–7.7)
NEUTROPHILS NFR BLD AUTO: 61.4 %
OSMOLALITY SERPL CALC.SUM OF ELEC: 316 MOSM/KG (ref 275–295)
PLATELET # BLD AUTO: 151 10(3)UL (ref 150–450)
POTASSIUM SERPL-SCNC: 4.6 MMOL/L (ref 3.5–5.1)
RBC # BLD AUTO: 1.99 X10(6)UL
SODIUM SERPL-SCNC: 141 MMOL/L (ref 136–145)
WBC # BLD AUTO: 11 X10(3) UL (ref 4–11)

## 2025-03-12 PROCEDURE — 30233N1 TRANSFUSION OF NONAUTOLOGOUS RED BLOOD CELLS INTO PERIPHERAL VEIN, PERCUTANEOUS APPROACH: ICD-10-PCS | Performed by: HOSPITALIST

## 2025-03-12 PROCEDURE — 0DJ08ZZ INSPECTION OF UPPER INTESTINAL TRACT, VIA NATURAL OR ARTIFICIAL OPENING ENDOSCOPIC: ICD-10-PCS | Performed by: STUDENT IN AN ORGANIZED HEALTH CARE EDUCATION/TRAINING PROGRAM

## 2025-03-12 PROCEDURE — 99233 SBSQ HOSP IP/OBS HIGH 50: CPT | Performed by: HOSPITALIST

## 2025-03-12 RX ORDER — PANTOPRAZOLE SODIUM 40 MG/1
40 TABLET, DELAYED RELEASE ORAL
Qty: 180 TABLET | Refills: 0 | Status: SHIPPED | OUTPATIENT
Start: 2025-03-12

## 2025-03-12 RX ORDER — LIDOCAINE HYDROCHLORIDE 10 MG/ML
INJECTION, SOLUTION EPIDURAL; INFILTRATION; INTRACAUDAL; PERINEURAL AS NEEDED
Status: DISCONTINUED | OUTPATIENT
Start: 2025-03-12 | End: 2025-03-12 | Stop reason: SURG

## 2025-03-12 RX ORDER — SODIUM CHLORIDE 9 MG/ML
INJECTION, SOLUTION INTRAVENOUS ONCE
Status: COMPLETED | OUTPATIENT
Start: 2025-03-12 | End: 2025-03-12

## 2025-03-12 RX ADMIN — LIDOCAINE HYDROCHLORIDE 50 MG: 10 INJECTION, SOLUTION EPIDURAL; INFILTRATION; INTRACAUDAL; PERINEURAL at 16:24:00

## 2025-03-12 NOTE — ANESTHESIA POSTPROCEDURE EVALUATION
[facility]    Leon Iniguez Patient Status:  Inpatient   Age/Gender 79 year old male MRN [id number]   Location [facility] ENDOSCOPY PAIN CENTER Attending Shekhar Aldana MD   Hosp Day # 1 PCP Juan Jose Perez MD       Anesthesia Post-op Note    ESOPHAGOGASTRODUODENOSCOPY (EGD)    Procedure Summary       Date: 03/12/25 Room / Location:  ENDOSCOPY 04 /  ENDOSCOPY    Anesthesia Start: 1621 Anesthesia Stop: 1635    Procedure: ESOPHAGOGASTRODUODENOSCOPY (EGD) Diagnosis: (GASTRIC ULCERS, DUODENAL ULCERS, HIATAL HERNIA)    Surgeons: Benito Gannon DO Anesthesiologist: Pollo Wesley MD    Anesthesia Type: MAC ASA Status: 3            Anesthesia Type: MAC    Vitals Value Taken Time   /40 03/12/25 1636   Temp  03/12/25 1636   Pulse 70 03/12/25 1635   Resp 16 03/12/25 1635   SpO2 91 % 03/12/25 1635   Vitals shown include unfiled device data.        Patient Location: Endoscopy    Anesthesia Type: MAC    Airway Patency: patent    Postop Pain Control: adequate    Mental Status: mildly sedated but able to meaningfully participate in the post-anesthesia evaluation    Nausea/Vomiting: none    Cardiopulmonary/Hydration status: stable euvolemic    Complications: no apparent anesthesia related complications    Postop vital signs: stable    Dental Exam: Unchanged from Preop    Patient to be discharged from PACU when criteria met.

## 2025-03-12 NOTE — OPERATIVE REPORT
EGD Operative Report  Patient Name: Leon Iniguez  YOB: 1945  MRN: [id number]  Procedure: Esophagogastroduodenoscopy (EGD)  Date of Service: 3/12/2025  Pre-operative Diagnosis & Indication: Melena, anemia  Post-operative Diagnosis:   Normal-appearing esophagus with GE junction at 40 cm  3 [place] grade 2 hiatal hernia  Multiple clean-based gastric ulcers  Multiple clean-based duodenal ulcers  Attending Endoscopist: Benito Gannon D.O.  Informed Consent: The planned procedure(s), the explanation of the procedure, its expected benefits, the potential complications and risks and possible alternatives and their benefits and risks were discussed with the patient or the patient's surrogate. The discussion of risks, not limited to but including bleeding, infection, perforation, adverse effects from anesthesia, need for emergency surgery/prolonged hospitalization,  cardiac arrhythmias,  and aspiration were discussed with patient.  Pt and/or surrogate understood the proposed procedure(s), its risks, benefits and alternatives and wish to proceed with procedure(s). All questions answered in full.  After all questions were answered to their satisfaction, a signed, informed, and witnessed consent was obtained.  A TIME OUT WAS COMPLETED prior to the procedure to confirm the patient, procedure(s) and complete endoscopy safety procedure.  Sedation: Monitored Anesthesia Care; ASA class per anesthesiology team   Monitoring: Pulsoximetry, pulse, respirations, and blood pressure , vitals were monitored throughout the entire procedure under monitored anesthesia care.   Procedure: The patient was then brought to the endoscopy suite where his/her pulse, pulse oximetry and blood pressure were monitored. The patient was placed in the left lateral decubitus position and deep sedation was administered. Once adequate sedation was achieved, a bite block was placed and a lubricated tip of an Olympus video upper endoscope  was inserted through the oropharynx and gently manipulated through the esophagus into the stomach and the second portion of the duodenum. Upon withdrawal of the endoscope, careful visualization of the mucosa was performed. The endoscope was then withdrawn into the gastric antrum and placed in a retroflexed position.  The endoscope was then righted, and air was suctioned from the stomach.  The endoscope was then withdrawn from the patient, with careful visual inspection of the mucosa. The patient left the procedure room in stable condition for recovery. Findings and endotherapy as listed below  Toleration: Patient tolerated procedure well  Complications: No immediate complications  Technical Difficulty: The procedure was not technically difficult   Estimated Blood Loss: Minimal, less than 5mL of estimated blood loss.   Findings and Therapeutics:  Esophagus:   The mucosa was normal.   There were no strictures or stenosis. GEJ junction traversed with endoscope without resistance.  The Z-line was irregular, appreciated at 40 cm from the incisors with diaphragmatic pinch at 43 cm.    Stomach:    The gastric body, antrum, fundus, cardia, and angularis were mildly erythematous endoscopically consistent with mild gastritis.  Numerous clean-based ulcers were noted throughout the body and antrum ranging in size from 2 mm to 6 mm.  No masses visualized. Endoscope was placed in a retroflexion view in the stomach. There was evidence of a Hill grade 2 sliding hiatal hernia.  Duodenum:   There are few clean-based ulcers noted in the duodenal bulb and 1 small clean-based ulcer noted in the second portion of the duodenum.  Ulcers in the bulb range in size from 3 mm to 1 cm.  Recommendations:  Post EGD precautions, watch for bleeding, infection, perforation, adverse drug reactions   Follow-up biopsies  Pantoprazole 40 mg twice daily  Avoid non-aspirin NSAID  GI soft diet  Okay to restart Plavix tomorrow  Continue to monitor  hemoglobin and transfuse for hemoglobin less than 7  At this time, GI service will sign off.  If hemoglobin remained stable and no further melena tomorrow, then patient can be discharged from GI perspective.  Please call with any additional questions or concerns.  Upon discharge, patient will need to follow-up with nurse practitioner or myself in 3 to 4 weeks.    Benito Gannon,   3/12/2025  4:35 PM

## 2025-03-12 NOTE — PROGRESS NOTES
[facility]   part of [facility]     Hospitalist Progress Note     Leon Iniguez Patient Status:  Inpatient    1945 MRN [id number]   Location [facility] 3NE-A Attending Shekhar Aldana MD   Hosp Day # 1 PCP Juan Jose Perez MD     Chief Complaint: Dark stools    Subjective:     Patient feels well today.  Denies fever, chills, n/v.  No cp or sob.  No new complaints.  Family at bedside.      Objective:    Review of Systems:   A comprehensive review of systems was completed; pertinent positive and negatives stated in subjective.    Vital signs:  Temp:  [97.3 °F (36.3 °C)-98.5 °F (36.9 °C)] 97.6 °F (36.4 °C)  Pulse:  [57-85] 57  Resp:  [16-18] 18  BP: ()/(48-64) 109/54  SpO2:  [97 %-100 %] 100 %    Physical Exam:    General: No acute distress, pleasant   Respiratory: No wheezes, no rhonchi  Cardiovascular: S1, S2, regular rate and rhythm  Abdomen: Soft, Non-tender, non-distended, positive bowel sounds  Neuro: No new focal deficits.   Extremities: No edema    Diagnostic Data:    Labs:  Recent Labs   Lab 25  1111 25  1130 25  1550 25  0004   WBC 13.4*  --   --   --    HGB 9.3*  --  7.8* 7.0*   MCV 97.8  --   --   --    .0  --   --   --    INR  --  1.14  --   --        Recent Labs   Lab 25  1111   *   BUN 85*   CREATSERUM 1.74*   CA 9.9   ALB 3.8      K 4.5      CO2 21.0   ALKPHO 53   AST 12   ALT <7*   BILT 0.3   TP 6.6       Estimated Glomerular Filtration Rate: 39 mL/min/1.73m2 (A) (result from lab).    No results for input(s): \"TROP\", \"TROPHS\", \"CK\" in the last 168 hours.    Recent Labs   Lab 25  1130   PTP 14.4   INR 1.14                  Microbiology    No results found for this visit on 25.      Imaging: Reviewed in Epic.    Medications:    pantoprazole  40 mg Intravenous Q12H    atorvastatin  40 mg Oral Nightly    carvedilol  12.5 mg Oral BID with meals    divalproex DR  250 mg Oral QPM    memantine  5 mg Oral BID    OLANZapine   5 mg Oral Nightly       Assessment & Plan:      #ABLA 2/2 GIB  -Serial H/H - Hg down to 6.8,s/p 1unit prbc   -Transfuse for maintain hb >7  -GI on consult  -PPI BID  -EGD planned today      #Chronic HFrEF  -only on beta blocker      #CKD III  -near baseline      #Severe Aortic Stenosis s/p TAVR     #Hyperglycemia, hx of DM does not appear to be on any medication currently   -HgA1c of 5.7     #HTN  -Carvedilol     #CAD s/p PCI  -hold DAPT  -Beta blocker, Statin      #Dementia  -PTA Namenda, Zyprexa, Depkaote       Shekhar Aldana MD    Supplementary Documentation:     Quality:  DVT Mechanical Prophylaxis:     Early ambuation  DVT Pharmacologic Prophylaxis   Medication   None                Code Status: Not on file  Hernandez: No urinary catheter in place  Hernandez Duration (in days):   Central line:    EDWARDO:     Discharge is dependent on: Hg, EGD  At this point Mr. Iniguez is expected to be discharge to: Home    The 21st Century Cures Act makes medical notes like these available to patients in the interest of transparency. Please be advised this is a medical document. Medical documents are intended to carry relevant information, facts as evident, and the clinical opinion of the practitioner. The medical note is intended as peer to peer communication and may appear blunt or direct. It is written in medical language and may contain abbreviations or verbiage that are unfamiliar.

## 2025-03-12 NOTE — PROGRESS NOTES
Occupational Therapy    Orders received and chart review completed. Per RN, Pt with plan for EGD around noon today and will get blood before then. Will hold this morning and re-attempt in afternoon as clinically appropriate.

## 2025-03-12 NOTE — PROGRESS NOTES
Assumed care at 7:30 am  Alert and oriented x4- with some forgetfulness  1 unit of PRBC given- tolerated well  NPO for EGD today  Wife and son were at bedside- went down with patient.  Denies pain. All safety precautions in place.

## 2025-03-12 NOTE — ANESTHESIA PREPROCEDURE EVALUATION
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  Temp: 97.5 °F (36.4 °C)  Pulse: 70  Resp: 18  BP: 87/47  SpO2: 100 %  Body mass index is 28.59 kg/m².    Current medications reviewed.  Hospital Medications:   [COMPLETED] sodium chloride 0.9% infusion   Intravenous Once    [COMPLETED] pantoprazole (Protonix) 80 mg in sodium chloride 0.9% PF 20 mL IV push  80 mg Intravenous Once    [COMPLETED] sodium chloride 0.9% infusion   Intravenous Once    [] sodium chloride 0.9% infusion   Intravenous Continuous    [] ondansetron (Zofran) 4 MG/2ML injection 4 mg  4 mg Intravenous Q4H PRN    pantoprazole (Protonix) 40 mg in sodium chloride 0.9% PF 10 mL IV push  40 mg Intravenous Q12H    atorvastatin (Lipitor) tab 40 mg  40 mg Oral Nightly    carvedilol (Coreg) tab 12.5 mg  12.5 mg Oral BID with meals    divalproex DR (Depakote) tab 250 mg  250 mg Oral QPM    memantine (Namenda) tab 5 mg  5 mg Oral BID    OLANZapine (ZyPREXA) tab 5 mg  5 mg Oral Nightly    acetaminophen (Tylenol Extra Strength) tab 1,000 mg  1,000 mg Oral Q8H PRN    melatonin tab 3 mg  3 mg Oral Nightly PRN    ondansetron (Zofran) 4 MG/2ML injection 4 mg  4 mg Intravenous Q6H PRN    polyethylene glycol (PEG 3350) (Miralax) 17 g oral packet 17 g   17 g Oral Daily PRN    sennosides (Senokot) tab 17.2 mg  17.2 mg Oral Nightly PRN    bisacodyl (Dulcolax) 10 MG rectal suppository 10 mg  10 mg Rectal Daily PRN    fleet enema (Fleet) rectal enema 133 mL  1 enema Rectal Once PRN    benzonatate (Tessalon) cap 200 mg  200 mg Oral TID PRN    guaiFENesin (Robitussin) 100 MG/5 ML oral liquid 200 mg  200 mg Oral Q4H PRN    glycerin-hypromellose- (Artificial Tears) 0.2-0.2-1 % ophthalmic solution 1 drop  1 drop Both Eyes QID PRN    sodium chloride (Saline Mist) 0.65 % nasal solution 1 spray  1 spray Each Nare Q3H PRN       Outpatient Medications:   Prescriptions Prior to Admission[1]    Allergies: Lisinopril      Anesthesia Evaluation    Patient summary reviewed.    Anesthetic Complications  (-) history of anesthetic complications         GI/Hepatic/Renal             (+) chronic renal disease                    Cardiovascular                  (+) hypertension   (+) hyperlipidemia  (+) CAD                (+) CHF                Endo/Other      (+) diabetes                            Pulmonary                           Neuro/Psych  Comment: alzheimer                                    Past Surgical History:   Procedure Laterality Date    Colectomy      part of colon taken     Social History     Socioeconomic History    Marital status:    Tobacco Use    Smoking status: Former     Current packs/day: 0.00     Types: Cigarettes     Quit date: 2/3/1990     Years since quittin.1    Smokeless tobacco: Never   Vaping Use    Vaping status: Never Used   Substance and Sexual Activity    Alcohol use: Not Currently     Comment: every day for years, LAST DRINK 2023    Drug use: Never   Other Topics Concern    Caffeine Concern No    Exercise No     History   Drug Use Unknown     Available pre-op labs reviewed.  Lab Results   Component Value Date    WBC 11.0 2025    RBC 1.99 (L) 2025    HGB 7.8 (L) 2025    HCT 19.7 (L) 2025    MCV 99.0  03/12/2025    MCH 34.2 (H) 03/12/2025    MCHC 34.5 03/12/2025    RDW 13.5 03/12/2025    .0 03/12/2025     Lab Results   Component Value Date     03/12/2025    K 4.6 03/12/2025     03/12/2025    CO2 25.0 03/12/2025    BUN 79 (H) 03/12/2025    CREATSERUM 1.89 (H) 03/12/2025     (H) 03/12/2025    CA 9.0 03/12/2025     Lab Results   Component Value Date    INR 1.14 03/11/2025         Airway      Mallampati: II  Mouth opening: 3 FB  TM distance: 4 - 6 cm  Neck ROM: full Cardiovascular      Rhythm: regular  Rate: normal     Dental  Comment: No loose teeth reported by patient           Pulmonary      Breath sounds clear to auscultation bilaterally.               Other findings              ASA: 3                                Present on Admission:  **None**             [1]   Medications Prior to Admission   Medication Sig Dispense Refill Last Dose/Taking    MEMANTINE 5 MG Oral Tab TAKE ONE TABLET BY MOUTH TWICE DAILY 180 tablet 1 3/11/2025    carvedilol 12.5 MG Oral Tab Take 1 tablet (12.5 mg total) by mouth 2 (two) times daily with meals.   3/11/2025    Polyethylene Glycol 3350 17 g Oral Powd Pack Take 17 g by mouth at bedtime.   3/10/2025    divalproex  MG Oral Tab EC DR tab Take 1 tablet (250 mg total) by mouth every evening.   3/10/2025    OLANZapine 5 MG Oral Tab Take 1 tablet (5 mg total) by mouth nightly.   3/10/2025    aspirin 81 MG Oral Tab EC Take 1 tablet (81 mg total) by mouth daily. 30 tablet 11 3/11/2025    atorvastatin 40 MG Oral Tab Take 1 tablet (40 mg total) by mouth nightly. 30 tablet 3 3/10/2025    clopidogrel 75 MG Oral Tab Take 1 tablet (75 mg total) by mouth daily. 30 tablet 11 3/11/2025

## 2025-03-12 NOTE — OCCUPATIONAL THERAPY NOTE
OCCUPATIONAL THERAPY EVALUATION - INPATIENT     Room Number:  ENDO POOL ROOMS/ ENDO Pool  Evaluation Date: 3/12/2025  Type of Evaluation: Initial  Presenting Problem: GIB    Physician Order: IP Consult to Occupational Therapy  Reason for Therapy: ADL/IADL Dysfunction and Discharge Planning    OCCUPATIONAL THERAPY ASSESSMENT   Patient is currently functioning below baseline with lower body dressing, transfers, static standing balance, dynamic standing balance, and functional standing tolerance. Prior to admission, patient's baseline is MOD I.  Patient is requiring minimum assistance as a result of the following impairments: decreased functional strength, decreased functional reach, decreased endurance, impaired standing balance, decreased muscular endurance, and medical status. Occupational Therapy will continue to follow for duration of hospitalization.    Patient will benefit from continued skilled OT Services at discharge to promote prior level of function and safety with additional support and return home with home health OT    History Related to Current Admission: Patient is a 79 year old male admitted on 3/11/2025 with Presenting Problem: GIB. Co-Morbidities : CHF, CAD    Recommendations for nursing staff:   Transfers: x1 with RW  Toileting location: pending BP - commode    EVALUATION SESSION:  Patient Start of Session: semi-supine     FUNCTIONAL TRANSFER ASSESSMENT  Sit to Stand: Edge of Bed  Edge of Bed: Minimal Assist    BED MOBILITY  Supine to Sit : Minimal Assist  Scooting: SBA    BALANCE ASSESSMENT  Static Sitting: Stand-by Assist  Static Standing: Minimal Assist    FUNCTIONAL ADL ASSESSMENT  LB Dressing Seated: Moderate Assist (able to achieve cross leg sitting to doff socks, needs assist to don socks)    ACTIVITY TOLERANCE: BP monitored as follows  87/65 sitting EOB  100/57 sitting after light exercises  87/47 standing  124/58 semi-supine   Pt reports only mild dizziness      COGNITION  Arousal/Alertness:  appropriate responses to stimuli  Orientation Level:  oriented x4  Following Commands:  follows one step commands consistently  Safety Judgement:  good awareness of safety precautions    Upper Extremity   ROM: within functional limits   Strength: within functional limits   Coordination  Gross motor: wfl  Fine motor: wfl    EDUCATION PROVIDED  Patient Education : Role of Occupational Therapy; Plan of Care; Functional Transfer Techniques; Posture/Positioning; Proper Body Mechanics  Patient's Response to Education: Verbalized Understanding; Requires Further Education  Family/Caregiver Education : Role of Occupational Therapy; Plan of Care  Family/Caregiver's Response to Education: Verbalized Understanding    Equipment used: RW  Demonstrates functional use, Would benefit from additional trial      Therapist comments: Supportive wife and son present and included in education. Pt assisted back to bed at end of session d/t low BP and transport supposed to arrive shortly and take Pt for EGD. Discussed recommendation to get up to chair with nursing staff for dinner if feeling well.     Patient End of Session: In bed, Needs met, Call light within reach, RN aware of session/findings, Hospital anti-slip socks, All patient questions and concerns addressed, Alarm set, Family present    OCCUPATIONAL PROFILE    HOME SITUATION  Type of Home: House  Home Layout: Two level, Able to live on main level  Lives With: Spouse    Toilet and Equipment: Standard height toilet  Shower/Tub and Equipment: Walk-in shower, Grab bar (built-in seat)  Other Equipment:  (RW, cane)    Occupation/Status: retired - worked in computers     Drives: Other (Comment) (does not drive much anymore)  Patient Regularly Uses: None    Prior Level of Function: Pt lives with his wife and is typically MOD I for Adls and mobility. Spouse and son report Pt gets around okay at home but has slowed down considerably over the last few  years.     SUBJECTIVE   Pt is pleasant and agreeable to therapy.    PAIN ASSESSMENT  Ratin  Location: denies       OBJECTIVE  Precautions: Bed/chair alarm  Fall Risk: Standard fall risk    ASSESSMENTS    AM-PAC ‘6-Clicks’ Inpatient Daily Activity Short Form  -   Putting on and taking off regular lower body clothing?: A Lot  -   Bathing (including washing, rinsing, drying)?: A Little  -   Toileting, which includes using toilet, bedpan or urinal? : A Little  -   Putting on and taking off regular upper body clothing?: None  -   Taking care of personal grooming such as brushing teeth?: None  -   Eating meals?: None    AM-PAC Score:  Score: 20  Approx Degree of Impairment: 38.32%  Standardized Score (AM-PAC Scale): 42.03    ADDITIONAL TESTS     NEUROLOGICAL FINDINGS      COGNITION ASSESSMENTS     PLAN  OT Device Recommendations: TBD  OT Treatment Plan: Energy conservation/work simplification techniques, ADL training, Functional transfer training, Endurance training, Equipment eval/education, Patient/Family education, Patient/Family training, Compensatory technique education  Rehab Potential : Good  Frequency: 3-5x/week  Number of Visits to Meet Established Goals: 5    ADL Goals   Patient will perform grooming: with stand by assist and while standing at sink  Patient will perform upper body dressing:  with setup  Patient will perform lower body dressing:  with stand by assist and with adaptive equipment PRN  Patient will perform toileting: with stand by assist    Functional Transfer Goals  Patient will transfer from supine to sit:  with stand by assist  Patient will transfer from sit to stand:  with stand by assist  Patient will transfer to toilet:  with supervision    Patient Evaluation Complexity Level:   Occupational Profile/Medical History LOW - Brief history including review of medical or therapy records    Specific performance deficits impacting engagement in ADL/IADL MODERATE  3 - 5 performance deficits    Client Assessment/Performance Deficits MODERATE - Comorbidities and min to mod modifications of tasks    Clinical Decision Making LOW - Analysis of occupational profile, problem-focused assessments, limited treatment options    Overall Complexity LOW     OT Session Time: 24 minutes  Self-Care Home Management: 3 minutes  Therapeutic Activity: 10 minutes

## 2025-03-12 NOTE — PLAN OF CARE
Assumed care at 1930. A&OX4. RA NSR on tele. CLD, NPO at midnight. Cardiac electrolyte protocol. PIV R Hand and L Forearm SL. Pt c/o pain, PRN Tylenol given per MAR. Up x1 assist and walker. Safety precautions in place, call light within each. Will continue with plan of care.

## 2025-03-12 NOTE — PAYOR COMM NOTE
--------------  ADMISSION REVIEW     Payor: [employer]  Subscriber #:  [id number]  Authorization Number: [id number]    Admit date: 3/11/25  Admit time:  1:33 PM       REVIEW DOCUMENTATION:  ED Provider Notes signed by Sania Jones MD at 3/11/2025  2:56 PM       Author: Sania Jones MD Service: -- Author Type: Physician    Filed: 3/11/2025  2:56 PM Date of Service: 3/11/2025  2:51 PM Status: Signed     Patient Seen in: [facility]    History     Chief Complaint   Patient presents with    Dizziness    GI Bleeding     Stated Complaint: dizziness  and diarrhea states it black    HPI  79-year-old male presents to the emergency department with family for evaluation after the patient had several loose to watery black-colored stools today.  Patient also stated that he felt weak and lightheaded when standing.  Some abdominal cramping associated with diarrhea but no nausea or vomiting.  Past surgical history is remarkable for colon resection for abnormal polyp.  No chest pain, difficulty breathing or loss of consciousness today.  Patient is on aspirin and Plavix because of several coronary stents.  He does use ibuprofen as needed for joint pains.    Physical Exam     ED Triage Vitals [03/11/25 1105]   /62   Pulse 85   Resp 16   Temp 97.8 °F (36.6 °C)   Temp src    SpO2 100 %   O2 Device None (Room air)     Vital Signs  BP: 152/64  Pulse: 74  Resp: 16  Temp: 97.8 °F (36.6 °C)    Oxygen Therapy  SpO2: 100 %  O2 Device: None (Room air)    Physical Exam  General Appearance: This is an older male sitting on a gurney.  Vital signs were reviewed per nurses notes.  HEENT: Normocephalic/atraumatic.  Anicteric sclera.  Oral mucosa is moist.  Oropharynx is normal.  Neck: No adenopathy or thyromegaly.  Lungs are clear to auscultation.  Heart exam: Normal S1-S2 without extra sounds or murmurs.  Regular rate and rhythm.  Abdomen is soft and nontender without masses or rebound.  There is black stool  near the anus which was Hemoccult to strongly positive.  Skin is dry without rashes or lesions.  Extremities are atraumatic.  Neuroexam: Awake, conversive and moving all 4 extremities well.    ED Course     Labs Reviewed   CBC WITH DIFFERENTIAL WITH PLATELET - Abnormal; Notable for the following components:       Result Value    WBC 13.4 (*)     RBC 2.78 (*)     HGB 9.3 (*)     HCT 27.2 (*)     Neutrophil Absolute Prelim 10.59 (*)     Neutrophil Absolute 10.59 (*)     All other components within normal limits   COMP METABOLIC PANEL (14) - Abnormal; Notable for the following components:    Glucose 224 (*)     BUN 85 (*)     Creatinine 1.74 (*)     Calculated Osmolality 323 (*)     eGFR-Cr 39 (*)     ALT <7 (*)     All other components within normal limits   PROTHROMBIN TIME (PT) - Normal   PTT, ACTIVATED - Normal   TYPE AND SCREEN   ABORH (BLOOD TYPE)   ANTIBODY SCREEN   C. DIFFICILE(TOXIGENIC)PCR     Intravenous access was obtained.  Laboratory studies were drawn.  Type and screen was ordered.    Intravenous Protonix was administered.    EMR was reviewed and the patient's last hemoglobin was a year ago and was 11.  That indicates a 2 point drop in hemoglobin.  BUN is also elevated at 85 with a creatinine of 1.74.    [place] hospitalist on-call was contacted via [facility] as well as [facility].  Arrangements were made for transfer to [facility] by ambulance.  Test results and treatment plan were discussed with the patient and family prior to transfer.    MDM      #1.  GI bleed.  2.  Anemia.  Likely has severe erosive gastritis or peptic ulcer disease related to NSAID use.  3.  Azotemia secondary to #1.  4.  Type 2 diabetes mellitus.  5.  Mild Alzheimer's dementia.  6.  Congestive heart failure.    Admission disposition: 3/11/2025 11:58 AM    MDM  Disposition and Plan     Clinical Impression:  1. Gastrointestinal hemorrhage, unspecified gastrointestinal hemorrhage type    2. Anemia, unspecified type    3.  Azotemia    4. Type 2 diabetes mellitus with hyperglycemia, unspecified whether long term insulin use (HCC)    5. Mild Alzheimer's dementia without behavioral disturbance, psychotic disturbance, mood disturbance, or anxiety, unspecified timing of dementia onset (HCC)    6. Chronic congestive heart failure, unspecified heart failure type (HCC)       Disposition:  Admit  3/11/2025 11:58 am    Hospital Problems       Present on Admission  Date Reviewed: 8/7/2024            ICD-10-CM Noted POA    * (Principal) Gastrointestinal hemorrhage, unspecified gastrointestinal hemorrhage type K92.2 3/11/2025 Unknown    Anemia, unspecified type D64.9 3/11/2025 Unknown    Azotemia R79.89 3/11/2025 Unknown    Chronic congestive heart failure, unspecified heart failure type (HCC) I50.9 3/11/2025 Unknown    Mild Alzheimer's dementia without behavioral disturbance, psychotic disturbance, mood disturbance, or anxiety, unspecified timing of dementia onset ([facility]) G30.9, F02.A0 3/11/2025 Unknown    Type 2 diabetes mellitus with hyperglycemia, unspecified whether long term insulin use (HCC) E11.65 3/11/2025 Unknown          Sania Jones MD on 3/11/2025  2:56 PM       Gastroenterology Consultation   Reason for consultation: Melena  HPI: This is an 79 yr-old male with a PMHx that includes CAD s/p PCI (2023; ASA + Plavix--last dose 3/10), aortic stenosis s/p TAVR, HF, DM, CKD, dementia, and colon cancer (Dx 25-30 years ago) s/p colon resection who presented to ER today with dizziness in the setting of black tarry stools.  Patient reports being in his usual state of health until this morning when he suddenly passed black loose/watery stools.  He reports several episodes this morning which progressed to weakness and dizziness with standing.  Patient denies nausea, vomiting, dysphagia, odynophagia, and abdominal pain.  He reports chronic heartburn which requires sporadic Rolaids--no recent increase in symptoms.  No diarrhea prior to onset  of symptoms and he denies a prior history of overt GI bleeding.  Patient is on Plavix plus ASA and reports using ibuprofen 200 mg daily to help aid with various aches/pains prior to sleep.  No change in appetite and he denies fluctuations in weight or early satiety.  Patient denies undergoing an EGD and last colonoscopy was prior to colon cancer surgery.  No new imaging; labs with Hgb 9.3 (MCV 98) from a baseline of 10-11 over the last year, normal platelets, normal INR 1.14, and creatinine at baseline however BUN more than doubled compared to baseline (85 from 33).  No overt GI bleeding since arrival the patient currently denies chest pain or dyspnea   /64   Pulse 74   Temp 97.8 °F (36.6 °C)   Resp 16   Ht 5' 8\"  Wt 188 lb (85.3 kg)   SpO2 100%   BMI 28.59 kg/m²     Lab 03/11/25  1111   *   BUN 85*   CREATSERUM 1.74*   CA 9.9      K 4.5      CO2 21.0     RBC 2.78*   HGB 9.3*   HCT 27.2*   MCV 97.8   MCH 33.5   MCHC 34.2   RDW 13.2   NEPRELIM 10.59*   WBC 13.4*   .0      ALT <7*   AST 12     Impression:   presented to the ER today with dizziness and melena. Patient notes that started this morning with several loose black stools. He then became weak and dizzy so came to the emergency room. He does take ibuprofen on a daily basis and has been doing this for at least the last 3 months. Hemoglobin on admission was 9.3. Platelet count and INR normal. BUN is elevated.   Suspect upper GI bleed especially in the setting of aspirin, Plavix, and ibuprofen use.   Recommendations:    Will plan for EGD tomorrow for further evaluation.  This will likely be diagnostic only given Plavix use, however if there is significant bleeding noted during the endoscopy, can consider clipping versus Hemospray and may need relook once Plavix has been held for at least 5 days.      - Continue to monitor his hemoglobin and transfuse for hemoglobin less than 7.    - Pantoprazole 40 mg IV twice daily.    -  Recommend holding Plavix if okay with primary.    - Avoid NSAIDs.    - Pending results of EGD, patient may need inpatient colonoscopy if no obvious etiology is noted on EGD.  Otherwise, would recommend outpatient colonoscopy especially given his history of colon cancer and has not had a colonoscopy since his surgical resection 25 years ago.      Benito Gannon, DO  10:13 PM  3/11/2025     History and Physical   Chief Complaint: Lightheaded, dark stools    History of Present Illness:   79 year old male with past medical history of congestive heart failure, coronary artery disease presented to the emergency department with lightheadedness and dark stools.     Patient reportedly with watery and dark stools, he denies any abdominal pain, he became lightheaded and presented to the emergency department, patient has a history of coronary artery disease on dual antiplatelet therapy with aspirin and Plavix.  No chest pain or difficulty breathing.    omponent Value Date     INR 1.14 03/11/2025     Assessment & Plan:  #ABLA 2/2 GIB  -Serial H/H  -Transfuse for maintain hb >7  -GI on consult  -PPI BID     #Chronic HFrEF  -only on beta blocker      #CKD III  -near baseline      #Severe Aortic Stenosis s/p TAVR     #Hyperglycemia, hx of DM does not appear to be on any medication currently   -check A1c     #HTN  -Carvedilol     #CAD s/p PCI  -hold DAPT  -Beta blocker, Statin      #Dementia  -PTA Namenda, Zyprexa, Depkaote       **Certification  PHYSICIAN Certification of Need for Inpatient Hospitalization - Initial Certification  Patient will require inpatient services that will reasonably be expected to span two midnight's based on the clinical documentation in H+P.   Based on patients current state of illness, I anticipate that, after discharge, patient will require TBD.  Onofre Newby, DO  3/11/2025  4:07 PM     Component  Ref Range & Units 3/12/25 0755   WBC  4.0 - 11.0 x10(3) uL 11.0   RBC  3.80 - 5.80 x10(6)uL 1.99 Low     HGB  13.0 - 17.5 g/dL 6.8 Low Panic    HCT  39.0 - 53.0 % 19.7 Low    PLT  150.0 - 450.0 10(3)uL 151.0   MCV  80.0 - 100.0 fL 99.0   MCH  26.0 - 34.0 pg 34.2 High        MEDICATIONS ADMINISTERED IN LAST 1 DAY:  Transfuse RBC       Date Action Dose Route User    3/12/2025 1005 New Bag (none) Intravenous Karen Mendoza RN          acetaminophen (Tylenol Extra Strength) tab 1,000 mg       Date Action Dose Route User    3/12/2025 0824 Given 1,000 mg Oral Karen Mendoza RN    3/11/2025 2352 Given 1,000 mg Oral Yadira Balbuena RN          atorvastatin (Lipitor) tab 40 mg       Date Action Dose Route User    3/11/2025 2028 Given 40 mg Oral Yadira Balbuena RN          carvedilol (Coreg) tab 12.5 mg       Date Action Dose Route User    3/11/2025 1735 Given 12.5 mg Oral LanzaAlex normanystle          divalproex DR (Depakote) tab 250 mg       Date Action Dose Route User    3/11/2025 1741 Given 250 mg Oral Conchita Lanza          memantine (Namenda) tab 5 mg       Date Action Dose Route User    3/12/2025 0824 Given 5 mg Oral Karen Mendoza RN    3/11/2025 2028 Given 5 mg Oral Yadira Balbuena RN          OLANZapine (ZyPREXA) tab 5 mg       Date Action Dose Route User    3/11/2025 2028 Given 5 mg Oral Yadira Balbuena RN          pantoprazole (Protonix) 80 mg in sodium chloride 0.9% PF 20 mL IV push       Date Action Dose Route User    3/11/2025 1203 Given 80 mg Intravenous Nancy Lebron RN          pantoprazole (Protonix) 40 mg in sodium chloride 0.9% PF 10 mL IV push       Date Action Dose Route User    3/12/2025 0312 Given 40 mg Intravenous Yadira Balbuena RN    3/11/2025 1511 Given 40 mg Intravenous Lanza, Conchita          sodium chloride 0.9% infusion       Date Action Dose Route User    3/11/2025 1210 New Bag (none) Intravenous Ross, Cam, RN          sodium chloride 0.9% infusion       Date Action Dose Route User    3/11/2025 1443 New Bag (none) Intravenous Conchita Lanza     Vitals (last day)       Date/Time  Temp Pulse Resp BP SpO2 Weight O2 Device O2 Flow Rate (L/min) Who    03/12/25 0737 97.6 °F (36.4 °C) 57 18 109/54 100 % -- None (Room air) 0 L/min     03/12/25 0343 97.3 °F (36.3 °C) 66 17 113/49 98 % -- None (Room air) -- AC    03/11/25 2358 97.6 °F (36.4 °C) 67 16 122/52 98 % -- None (Room air) -- AC    03/11/25 1859 98.5 °F (36.9 °C) 67 18 98/48 97 % -- None (Room air) -- AC    03/11/25 1819 -- -- -- -- -- 188 lb (85.3 kg) -- -- KH    03/11/25 1700 97.8 °F (36.6 °C) 73 18 124/55 100 % -- -- -- WW    03/11/25 1211 -- 74 16 152/64 100 % -- None (Room air) -- MP    03/11/25 1105 97.8 °F (36.6 °C) 85 16 137/62 100 % 188 lb (85.3 kg) None (Room air) -- AF       Blood Transfusion Record       Product Unit Status Volume Start End            Transfuse RBC       25  [id number]  [id number] Transfusing  03/12/25 1005          FOR REVIEW/APPROVAL OF INPT ADMISSION

## 2025-03-12 NOTE — PHYSICAL THERAPY NOTE
PHYSICAL THERAPY EVALUATION - INPATIENT     Room Number: 3626  Evaluation Date: 3/12/2025  Type of Evaluation: Initial  Physician Order: PT Eval and Treat    Presenting Problem: GI Hemorrhage  Co-Morbidities : CHF, CAD  Reason for Therapy: Mobility Dysfunction and Discharge Planning    PHYSICAL THERAPY ASSESSMENT   Patient is a 79 year old male admitted 3/11/2025 for GI hemorrhage.  Prior to admission, patient's baseline is Mod I.  Patient is currently functioning near baseline with bed mobility, transfers, and gait.  Patient is requiring contact guard assist as a result of the following impairments: decreased endurance/aerobic capacity, decreased muscular endurance, and medical status.  Physical Therapy will continue to follow for duration of hospitalization.    Patient will benefit from continued skilled PT Services at discharge to promote prior level of function.  Anticipate patient will return home with home health PT.    PLAN DURING HOSPITALIZATION  Nursing Mobility Recommendation : 1 Assist  PT Device Recommendation: Rolling walker  PT Treatment Plan: Bed mobility, Body mechanics, Gait training, Strengthening, Stair training, Transfer training, Balance training  Rehab Potential : Good  Frequency (Obs): 3-5x/week     CURRENT GOALS    Goal #1 Patient is able to demonstrate supine - sit EOB @ level: supervision     Goal #2 Patient is able to demonstrate transfers EOB to/from BSC at assistance level: supervision     Goal #3 Patient is able to ambulate 150 feet with assist device: walker - rolling at assistance level: supervision     Goal #4    Goal #5    Goal #6    Goal Comments: Goals established on 3/12/2025      PHYSICAL THERAPY MEDICAL/SOCIAL HISTORY  History related to current admission: Patient is a 79 year old male admitted on 3/11/2025 from Home for GI Hemorrhage.     HOME SITUATION  Type of Home: House  Home Layout: Two level, Able to live on main level                     Lives With: Spouse    Drives:  Other (Comment) (does not drive much anymore)   Patient Regularly Uses: None      Prior Level of [place]: Pt states that he lives a house with his spouse. Pt reports that he is Mod I using the RW recently. Pt is Mod I with his ADLs. Spouse assist pt when needed. Pt has no history of falls.    SUBJECTIVE  \"I've been  since I was, 4\"    OBJECTIVE  Precautions: Bed/chair alarm  Fall Risk: Standard fall risk    WEIGHT BEARING RESTRICTION     PAIN ASSESSMENT  [place]  Location: Pt reports no pain or dizziness       COGNITION  Overall Cognitive Status:  WFL - within functional limits    RANGE OF MOTION AND STRENGTH ASSESSMENT  Upper extremity ROM and strength are within functional limits     Lower extremity ROM is within functional limits     Lower extremity strength is within functional limits     BALANCE  Static Sitting: Fair +  Dynamic Sitting: Fair +  Static Standing: Fair  Dynamic Standing: Fair -    ADDITIONAL TESTS                                    ACTIVITY TOLERANCE                         O2 WALK       NEUROLOGICAL FINDINGS                        AM-PAC '6-Clicks' INPATIENT SHORT FORM - BASIC MOBILITY  How much difficulty does the patient currently have...  Patient Difficulty: Turning over in bed (including adjusting bedclothes, sheets and blankets)?: A Little   Patient Difficulty: Sitting down on and standing up from a chair with arms (e.g., wheelchair, bedside commode, etc.): A Little   Patient Difficulty: Moving from lying on back to sitting on the side of the bed?: A Little   How much help from another person does the patient currently need...   Help from Another: Moving to and from a bed to a chair (including a wheelchair)?: A Little   Help from Another: Need to walk in hospital room?: A Little   Help from Another: Climbing 3-5 steps with a railing?: A Little     AM-PAC Score:  Raw Score: 18   Approx Degree of Impairment: 46.58%   Standardized Score (AM-PAC Scale): 43.63   CMS Modifier  (G-Code): CK    FUNCTIONAL ABILITY STATUS  Gait Assessment   Functional Mobility/Gait Assessment  Gait Assistance: Not tested    Skilled Therapy Provided     Bed Mobility:  Rolling: NT  Supine to sit: CGA   Sit to supine: CGA     Transfer Mobility:  Sit to stand: CGA   Stand to sit: CGA  Gait = NT    Therapist's Comments: Pt was able to perform side steps at the EOB using the RW. Pt's BP while standing was 87/47. Pt had no symptoms. RN was notified.    Exercise/Education Provided:  Bed mobility  Body mechanics  Gait training  Transfer training    Patient End of Session: In bed, Needs met, Call light within reach, RN aware of session/findings, All patient questions and concerns addressed, Family present      Patient Evaluation Complexity Level:  History Moderate - 1 or 2 personal factors and/or co-morbidities   Examination of body systems Low -  addressing 1-2 elements   Clinical Presentation Low- Stable   Clinical Decision Making Low Complexity       PT Session Time: 23 minutes  Therapeutic Activity: 15 minutes

## 2025-03-13 ENCOUNTER — TELEPHONE (OUTPATIENT)
Dept: FAMILY MEDICINE CLINIC | Facility: CLINIC | Age: 80
End: 2025-03-13

## 2025-03-13 VITALS
DIASTOLIC BLOOD PRESSURE: 50 MMHG | BODY MASS INDEX: 28.49 KG/M2 | TEMPERATURE: 98 F | HEIGHT: 68 IN | HEART RATE: 72 BPM | SYSTOLIC BLOOD PRESSURE: 101 MMHG | OXYGEN SATURATION: 98 % | WEIGHT: 188 LBS | RESPIRATION RATE: 15 BRPM

## 2025-03-13 LAB
BLOOD TYPE BARCODE: 6200
HGB BLD-MCNC: 7.5 G/DL
HGB BLD-MCNC: 8.7 G/DL
UNIT VOLUME: 350 ML

## 2025-03-13 PROCEDURE — 99239 HOSP IP/OBS DSCHRG MGMT >30: CPT | Performed by: HOSPITALIST

## 2025-03-13 RX ORDER — CLOPIDOGREL BISULFATE 75 MG/1
75 TABLET ORAL DAILY
Status: DISCONTINUED | OUTPATIENT
Start: 2025-03-13 | End: 2025-03-13

## 2025-03-13 NOTE — DISCHARGE SUMMARY
[facility]     Leon Iniguez Patient Status:  Inpatient    1945 MRN [id number]   Location [facility] Attending No att. providers found   Hosp Day # 2 PCP Juan Jose Perez MD     Date of Admission: 3/11/2025  Date of Discharge:  3/13/2025     Discharge Disposition: Home Health Care Services    Discharge Diagnosis:    #Acute blood loss anemia  #GI Bleed  #Gastric and Duodenal ulcers  #Chronic HFrEF  #CKD III  #Severe Aortic Stenosis s/p TAVR  #Hyperglycemia  #HTN  #CAD s/p PCI  #Dementia    History of Present Illness:   Leon Iniguez is a 79 year old male with past medical history of congestive heart failure, coronary artery disease presented to the emergency department with lightheadedness and dark stools.     Patient reportedly with watery and dark stools, he denies any abdominal pain, he became lightheaded and presented to the emergency department, patient has a history of coronary artery disease on dual antiplatelet therapy with aspirin and Plavix.  No chest pain or difficulty breathing.    Brief Synopsis:   Patient presented with dark stools.  He admitted for acute blood loss anemia due to GI bleed.  He was found to have multiple duodenal and gastric ulcers on EGD.  EGD was performed on 3/12.  Patient was cleared by GI for discharge home.  He was cleared to resume his aspirin Plavix on discharge.  Patient discharged home, can follow-up with PCP after discharge.  All question concerns addressed with patient, is agreeable plan of care as stated.  Encouraged return to the ER symptoms worsen.    Lace+ Score: 67  59-90 High Risk  29-58 Medium Risk  0-28   Low Risk  Patient was referred to the [facility].    TCM Follow-Up Recommendation:  LACE > 58: High Risk of readmission after discharge from the hospital.      Procedures during hospitalization:   EGD    Consultants:  GI    Discharge Medication List:     Discharge Medications        START taking these  medications        Instructions Prescription details   pantoprazole 40 MG Tbec  Commonly known as: Protonix      Take 1 tablet (40 mg total) by mouth 2 (two) times daily before meals.   Quantity: 180 tablet  Refills: 0            CONTINUE taking these medications        Instructions Prescription details   aspirin 81 MG Tbec      Take 1 tablet (81 mg total) by mouth daily.   Quantity: 30 tablet  Refills: 11     atorvastatin 40 MG Tabs  Commonly known as: Lipitor      Take 1 tablet (40 mg total) by mouth nightly.   Quantity: 30 tablet  Refills: 3     carvedilol 12.5 MG Tabs  Commonly known as: Coreg      Take 1 tablet (12.5 mg total) by mouth 2 (two) times daily with meals.   Refills: 0     clopidogrel 75 MG Tabs  Commonly known as: Plavix      Take 1 tablet (75 mg total) by mouth daily.   Quantity: 30 tablet  Refills: 11     divalproex  MG Tbec DR tab  Commonly known as: Depakote      Take 1 tablet (250 mg total) by mouth every evening.   Refills: 0     memantine 5 MG Tabs  Commonly known as: Namenda      TAKE ONE TABLET BY MOUTH TWICE DAILY   Quantity: 180 tablet  Refills: 1     OLANZapine 5 MG Tabs  Commonly known as: ZyPREXA      Take 1 tablet (5 mg total) by mouth nightly.   Refills: 0     Polyethylene Glycol 3350 17 g Pack  Commonly known as: MIRALAX      Take 17 g by mouth at bedtime.   Refills: 0               Where to Get Your Medications        These medications were sent to [id number] - [place], [place] - [address] [phone], [phone] [address] [facility] [address]      Phone: [phone]   pantoprazole 40 MG Tbec         [place]    Follow-up appointment:   [facility]  [address]  [facility] [address] [phone]  Schedule an appointment as soon as possible for a visit      Juan Jose Perez MD  [address] [place] [address]  [address]  [facility] [address] [phone]    Schedule an appointment as soon as possible for a visit in 1 week(s)      Appointments  for Next 30 Days 3/13/2025 - 2025        Date Arrival Time Visit Type Length Department Provider     3/21/2025 11:30 AM  EXAM - ESTABLISHED [2844] 30 min [address] Juan Jose Perez MD    Patient Instructions:         Location Instructions:     Masks are optional for all patients and visitors, unless otherwise indicated.                      Vital signs:  Temp:  [97.7 °F (36.5 °C)-98 °F (36.7 °C)] 98 °F (36.7 °C)  Pulse:  [] 72  Resp:  [0-21] 15  BP: ()/(47-72) 101/50  SpO2:  [92 %-98 %] 98 %    Physical Exam:    General: No acute distress, pleasant   Respiratory: No wheezes, no rhonchi  Cardiovascular: S1, S2, regular rate and rhythm  Abdomen: Soft, Non-tender, non-distended, positive bowel sounds  Neuro: No new focal deficits.   Extremities: No edema    -----------------------------------------------------------------------------------------------  PATIENT DISCHARGE INSTRUCTIONS: See electronic chart    Shekhar Aldana MD    Total time spent on discharge plannin minutes     The  [employer] Cures Act makes medical notes like these available to patients in the interest of transparency. Please be advised this is a medical document. Medical documents are intended to carry relevant information, facts as evident, and the clinical opinion of the practitioner. The medical note is intended as peer to peer communication and may appear blunt or direct. It is written in medical language and may contain abbreviations or verbiage that are unfamiliar.

## 2025-03-13 NOTE — CM/SW NOTE
03/13/25 1218   Choice of Post-Acute Provider   Informed patient of right to choose their preferred provider Yes   List of appropriate post-acute services provided to patient/family with quality data Yes   Patient/family choice [facility]   Information given to Son;Spouse/Significant other;Patient     SW received message from [facility] liaison Arely stating pt will have a $40 co-pay per visit. SW received message from [facility] in [facility] stating pt is covered at 100% for [place].     LEOBARDO met with pt and pt's family at bedside and updated them on above information. Pt's spouse agreeable with co-pay and selected [facility].     LEOBARDO reserved H in AIDIN and notified [facility] liaison of pt's discharge today.     SW/CM to remain available for support and/or discharge planning.    ZAHEER Gutierrez  Discharge Planner

## 2025-03-13 NOTE — PROGRESS NOTES
NURSING DISCHARGE NOTE    Discharged Home via Wheelchair.  Accompanied by Support staff  Belongings Taken by patient/family  AVS completed and reviewed wit patient  Peripheral IV removed   All questions answered

## 2025-03-13 NOTE — DISCHARGE INSTRUCTIONS
-Ride [employer]ist [place] - [phone] [email or web address]  -Ride [place] [phone] or [phone] [email or web address]  -First Transit [phone] [facility].[place].[facility]/[email or web address]_[email or web address]    -[place] Ride Around [facility] [phone]  -Community [facility] Transit System [phone]  -[facility] Transportation [phone] Ext. [phone]   -[facility] Pace Dial-a-Ride Service  Reservations: [phone]  -[facility] Websense Shuttle Bus Line at [phone]  -[facility] Senior Bus Service [phone]  -[facility] [phone].  -[facility] Transit 5-190-SVJ-[phone]  -[facility] [phone]   -[employer] Transportation [phone]  -Go GO Grandparent Transportation [phone] [email or web address]  -[employer] Transport [phone]  -Disabled on the Go [phone]  -[place] Safety Transfer [phone]  -[facility] Wheelchair Transport [phone]   -[employer] Transport: [phone]  -[facility] mobile [place]ys [phone]  -[employer] [phone]  -[employer] Provides [facility], [place], [facility], [place], and [facility]. [phone] [email or web address]  -[employer] Transportation [facility] and surrounding communities [phone] [email or web address]    Based on your assessment and our conversation today we've agreed on the following resource recommendations.     Recommended Community Resources:  Continuing Care   [employer] [place] - Transportation    [address] [place] [address] [address]  [address], [facility] [address]    Phone: [phone]    Website: [email or web address]    Request Status: Pending - No Request Sent    Services: [facility]    Resource for: Transportation Needs    Languages: English    Cost: Reduced Cost    Hours of Operation     Sunday --     Monday  9:00 AM -  1:00 PM     Tuesday  9:00 AM -   1:00 PM     Wednesday  9:00 AM -  1:00 PM     Thursday  9:00 AM -  1:00 PM     Friday  9:00 AM -  1:00 PM     Saturday --   [employer] - Volunteer Transportation Program    [address] [place] [address]    Phone: [phone]    Website: [email or web address]    Request Status: Pending - No Request Sent    Services: [employer], Transportation    Resource for: Transportation Needs    Languages: English    Cost: Free    Hours of Operation     Sunday --     Monday  8:00 AM -  4:00 PM     Tuesday  8:00 AM -  4:00 PM     Wednesday  8:00 AM -  4:00 PM     Thursday  8:00 AM -  4:00 PM     Friday  8:00 AM -  4:00 PM     Saturday --         Search for additional community resources at [email or web address] [email or web address]/    Sometimes managing your health at home requires assistance.  The [facility] team has recognized your preference to use [facility].  They can be reached by phone at [phone].  The fax number for your reference is [phone].  A representative from the home health agency will contact you or your family to schedule your first visit.

## 2025-03-13 NOTE — PLAN OF CARE
Assumed care at 1930. A&OX4, forgetful at times. RA. NSR on tele. Low fiber soft diet. Cardiac electrolyte protocol. PIV SL. Up with x1 assist and walker. Pt c/o pain, PRN Tylenol given per MAR. Safety precautions in place, call light within reach. Will continue with plan of care.      Problem: Patient/Family Goals  Goal: Patient/Family Long Term Goal  Description: Patient's Long Term Goal: discharge Interventions:- follow plan of care - See additional Care Plan goals for specific interventions  Outcome: Progressing  Goal: Patient/Family Short Term Goal  Description: Patient's Short Term Goal: medication compliance Interventions: - take scheduled medications- See additional Care Plan goals for specific interventions  Outcome: Progressing

## 2025-03-13 NOTE — CM/SW NOTE
Chart reviewed for discharge planning needs and noted therapy is anticipating pt will return home with home healthcare. Anticipated therapy need: Home with Home Healthcare    HH referrals sent in AIDIN, choice list will be provided once available. SW will f/u with pt regarding discharge plan.    ZAHEER Gutierrez  Discharge Planner
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
## 2025-03-13 NOTE — HOME CARE LIAISON
[facility] Spoke w/ patients spouse who is agreeable with [facility]. Contact information placed on AVS.    
Spontaneous, unlabored and symmetrical

## 2025-03-14 NOTE — PAYOR COMM NOTE
--------------  DISCHARGE REVIEW    Payor: [employer]  Subscriber #:  [id number]  Authorization Number: [id number]    Admit date: 3/11/25  Admit time:   1:33 PM  Discharge Date: 3/13/2025  1:29 PM     Admitting Physician: Leon Hui MD  Attending Physician:  No att. providers found  Primary Care Physician: Juan Jose Perez MD          Discharge Summary Notes        Discharge Summary signed by Shekhar Aldana MD at 3/13/2025  4:49 PM       Author: Shekhar Aldana MD Specialty: HOSPITALIST Author Type: Physician    Filed: 3/13/2025  4:49 PM Date of Service: 3/13/2025  4:43 PM Status: Signed    : Shekhar Aldana MD (Physician)           [facility]     Leon Iniguez Patient Status:  Inpatient    1945 MRN [id number]   Location [facility] 3NE-A Attending No att. providers found   Hosp Day # 2 PCP Juan Jose Perez MD     Date of Admission: 3/11/2025  Date of Discharge:  3/13/2025     Discharge Disposition: Home Health Care Services    Discharge Diagnosis:    #Acute blood loss anemia  #GI Bleed  #Gastric and Duodenal ulcers  #Chronic HFrEF  #CKD III  #Severe Aortic Stenosis s/p TAVR  #Hyperglycemia  #HTN  #CAD s/p PCI  #Dementia    History of Present Illness:   Leon Iniguez is a 79 year old male with past medical history of congestive heart failure, coronary artery disease presented to the emergency department with lightheadedness and dark stools.     Patient reportedly with watery and dark stools, he denies any abdominal pain, he became lightheaded and presented to the emergency department, patient has a history of coronary artery disease on dual antiplatelet therapy with aspirin and Plavix.  No chest pain or difficulty breathing.    Brief Synopsis:   Patient presented with dark stools.  He admitted for acute blood loss anemia due to GI bleed.  He was found to have multiple duodenal and gastric ulcers on EGD.  EGD was performed on 3/12.  Patient was cleared by GI for discharge  home.  He was cleared to resume his aspirin Plavix on discharge.  Patient discharged home, can follow-up with PCP after discharge.  All question concerns addressed with patient, is agreeable plan of care as stated.  Encouraged return to the ER symptoms worsen.    Lace+ Score: 67  59-90 High Risk  29-58 Medium Risk  0-28   Low Risk  Patient [facility].    TCM Follow-Up Recommendation:  LACE > 58: High Risk of readmission after discharge from the hospital.      Procedures during hospitalization:   EGD    Consultants:  GI    Discharge Medication List:     Discharge Medications        START taking these medications        Instructions Prescription details   pantoprazole 40 MG Tbec  Commonly known as: Protonix      Take 1 tablet (40 mg total) by mouth 2 (two) times daily before meals.   Quantity: 180 tablet  Refills: 0            CONTINUE taking these medications        Instructions Prescription details   aspirin 81 MG Tbec      Take 1 tablet (81 mg total) by mouth daily.   Quantity: 30 tablet  Refills: 11     atorvastatin 40 MG Tabs  Commonly known as: Lipitor      Take 1 tablet (40 mg total) by mouth nightly.   Quantity: 30 tablet  Refills: 3     carvedilol 12.5 MG Tabs  Commonly known as: Coreg      Take 1 tablet (12.5 mg total) by mouth 2 (two) times daily with meals.   Refills: 0     clopidogrel 75 MG Tabs  Commonly known as: Plavix      Take 1 tablet (75 mg total) by mouth daily.   Quantity: 30 tablet  Refills: 11     divalproex  MG Tbec DR tab  Commonly known as: Depakote      Take 1 tablet (250 mg total) by mouth every evening.   Refills: 0     memantine 5 MG Tabs  Commonly known as: Namenda      TAKE ONE TABLET BY MOUTH TWICE DAILY   Quantity: 180 tablet  Refills: 1     OLANZapine 5 MG Tabs  Commonly known as: ZyPREXA      Take 1 tablet (5 mg total) by mouth nightly.   Refills: 0     Polyethylene Glycol 3350 17 g Pack  Commonly known as: MIRALAX      Take 17 g by  mouth at bedtime.   Refills: 0               Where to Get Your Medications        These medications were sent to [id number] - [place], [place] - [address] [phone], [phone] [address], [facility] [address]      Phone: [phone]   pantoprazole 40 MG [facility]         [place] reviewed[place]    Follow-up appointment:   [facility]  [address]  [facility] [address] [phone]  Schedule an appointment as soon as possible for a visit      Juan Jose Perez MD   [address] [place] [address]  [address]  [facility] [address] [phone]    Schedule an appointment as soon as possible for a visit in 1 week(s)      Appointments for Next 30 Days 3/13/2025 - 2025        Date Arrival Time Visit Type Length Department Provider     3/21/2025 11:30 AM  EXAM - ESTABLISHED [2844] 30 min [facility], [address] [place] [address] Juan Jose Perez MD    Patient Instructions:         Location Instructions:     Masks are optional for all patients and visitors, unless otherwise indicated.                      Vital signs:  Temp:  [97.7 °F (36.5 °C)-98 °F (36.7 °C)] 98 °F (36.7 °C)  Pulse:  [] 72  Resp:  [0-21] 15  BP: ()/(47-72) 101/50  SpO2:  [92 %-98 %] 98 %    Physical Exam:    General: No acute distress, pleasant   Respiratory: No wheezes, no rhonchi  Cardiovascular: S1, S2, regular rate and rhythm  Abdomen: Soft, Non-tender, non-distended, positive bowel sounds  Neuro: No new focal deficits.   Extremities: No edema    -----------------------------------------------------------------------------------------------  PATIENT DISCHARGE INSTRUCTIONS: See electronic chart    Shekhar Aldana MD    Total time spent on discharge plannin minutes     The  [place] Cures Act makes medical notes like these available to patients in the interest of transparency. Please be advised this is a medical document. Medical documents are intended to carry relevant information, facts as evident, and the  clinical opinion of the practitioner. The medical note is intended as peer to peer communication and may appear blunt or direct. It is written in medical language and may contain abbreviations or verbiage that are unfamiliar.       Electronically signed by Shekhar Aldana MD on 3/13/2025  4:49 PM       3/12  Hospitalist    Chief Complaint: Dark stools        Subjective:  Patient feels well today.  Denies fever, chills, n/v.  No cp or sob.  No new complaints.  Family at bedside.          Objective:  Review of Systems:   A comprehensive review of systems was completed; pertinent positive and negatives stated in subjective.     Vital signs:  Temp:  [97.3 °F (36.3 °C)-98.5 °F (36.9 °C)] 97.6 °F (36.4 °C)  Pulse:  [57-85] 57  Resp:  [16-18] 18  BP: ()/(48-64) 109/54  SpO2:  [97 %-100 %] 100 %     Physical Exam:    General: No acute distress, pleasant   Respiratory: No wheezes, no rhonchi  Cardiovascular: S1, S2, regular rate and rhythm  Abdomen: Soft, Non-tender, non-distended, positive bowel sounds  Neuro: No new focal deficits.   Extremities: No edema     Diagnostic Data:    Labs:         Recent Labs   Lab 03/11/25  1111 03/11/25  1130 03/11/25  1550 03/12/25  0004   WBC 13.4*  --   --   --    HGB 9.3*  --  7.8* 7.0*   MCV 97.8  --   --   --    .0  --   --   --    INR  --  1.14  --   --              Recent Labs   Lab 03/11/25  1111   *   BUN 85*   CREATSERUM 1.74*   CA 9.9   ALB 3.8      K 4.5      CO2 21.0   ALKPHO 53   AST 12   ALT <7*   BILT 0.3   TP 6.6         Estimated Glomerular Filtration Rate: 39 mL/min/1.73m2 (A) (result from lab).     No results for input(s): \"TROP\", \"TROPHS\", \"CK\" in the last 168 hours.         Recent Labs   Lab 03/11/25  1130   PTP 14.4   INR 1.14                     Microbiology     No results found for this visit on 03/11/25.        Imaging: Reviewed in Epic.     Medications:   Scheduled Medications    pantoprazole  40 mg Intravenous Q12H    atorvastatin  40  mg Oral Nightly    carvedilol  12.5 mg Oral BID with meals    divalproex DR  250 mg Oral QPM    memantine  5 mg Oral BID    OLANZapine  5 mg Oral Nightly               Assessment & Plan:  #ABLA 2/2 GIB  -Serial H/H - Hg down to 6.8,s/p 1unit prbc   -Transfuse for maintain hb >7  -GI on consult  -PPI BID  -EGD planned today      #Chronic HFrEF  -only on beta blocker      #CKD III  -near baseline      #Severe Aortic Stenosis s/p TAVR     #Hyperglycemia, hx of DM does not appear to be on any medication currently   -HgA1c of 5.7     #HTN  -Carvedilol     #CAD s/p PCI  -hold DAPT  -Beta blocker, Statin      #Dementia  -PTA Namenda, Zyprexa, Depkaote         Shekhar Aldana MD        Supplementary Documentation:  Quality:  DVT Mechanical Prophylaxis:     Early ambuation  DVT Pharmacologic Prophylaxis   Medication   None                 Code Status: Not on file  Hernandez: No urinary catheter in place  Hernandez Duration (in days):   Central line:    EDWARDO:      Discharge is dependent on: Hg, EGD  At this point Mr. Iniguez is expected to be discharge to: Home                   Electronically signed by Shekhar Aldana MD at 3/12/2025  2:39 PM

## 2025-03-21 ENCOUNTER — LAB ENCOUNTER (OUTPATIENT)
Dept: LAB | Age: 80
End: 2025-03-21
Attending: FAMILY MEDICINE
Payer: MEDICARE

## 2025-03-21 ENCOUNTER — OFFICE VISIT (OUTPATIENT)
Dept: FAMILY MEDICINE CLINIC | Facility: CLINIC | Age: 80
End: 2025-03-21
Payer: MEDICARE

## 2025-03-21 VITALS
WEIGHT: 179 LBS | HEART RATE: 84 BPM | RESPIRATION RATE: 16 BRPM | HEIGHT: 68 IN | SYSTOLIC BLOOD PRESSURE: 142 MMHG | OXYGEN SATURATION: 98 % | BODY MASS INDEX: 27.13 KG/M2 | DIASTOLIC BLOOD PRESSURE: 84 MMHG

## 2025-03-21 DIAGNOSIS — L89.312 PRESSURE INJURY OF RIGHT BUTTOCK, STAGE 2 (HCC): ICD-10-CM

## 2025-03-21 DIAGNOSIS — R20.8 BURNING SENSATION OF FOOT: ICD-10-CM

## 2025-03-21 DIAGNOSIS — K25.4 GASTROINTESTINAL HEMORRHAGE ASSOCIATED WITH GASTRIC ULCER: ICD-10-CM

## 2025-03-21 DIAGNOSIS — Z09 HOSPITAL DISCHARGE FOLLOW-UP: Primary | ICD-10-CM

## 2025-03-21 LAB
BASOPHILS # BLD AUTO: 0.06 X10(3) UL (ref 0–0.2)
BASOPHILS NFR BLD AUTO: 0.6 %
EOSINOPHIL # BLD AUTO: 0.77 X10(3) UL (ref 0–0.7)
EOSINOPHIL NFR BLD AUTO: 7.7 %
ERYTHROCYTE [DISTWIDTH] IN BLOOD BY AUTOMATED COUNT: 14.1 %
HCT VFR BLD AUTO: 28.3 %
HGB BLD-MCNC: 9.2 G/DL
IMM GRANULOCYTES # BLD AUTO: 0.04 X10(3) UL (ref 0–1)
IMM GRANULOCYTES NFR BLD: 0.4 %
LYMPHOCYTES # BLD AUTO: 2.15 X10(3) UL (ref 1–4)
LYMPHOCYTES NFR BLD AUTO: 21.6 %
MCH RBC QN AUTO: 32.2 PG (ref 26–34)
MCHC RBC AUTO-ENTMCNC: 32.5 G/DL (ref 31–37)
MCV RBC AUTO: 99 FL
MONOCYTES # BLD AUTO: 0.98 X10(3) UL (ref 0.1–1)
MONOCYTES NFR BLD AUTO: 9.8 %
NEUTROPHILS # BLD AUTO: 5.95 X10 (3) UL (ref 1.5–7.7)
NEUTROPHILS # BLD AUTO: 5.95 X10(3) UL (ref 1.5–7.7)
NEUTROPHILS NFR BLD AUTO: 59.9 %
PLATELET # BLD AUTO: 292 10(3)UL (ref 150–450)
RBC # BLD AUTO: 2.86 X10(6)UL
VIT B12 SERPL-MCNC: 693 PG/ML (ref 211–911)
WBC # BLD AUTO: 10 X10(3) UL (ref 4–11)

## 2025-03-21 PROCEDURE — 1159F MED LIST DOCD IN RCRD: CPT | Performed by: FAMILY MEDICINE

## 2025-03-21 PROCEDURE — 99213 OFFICE O/P EST LOW 20 MIN: CPT | Performed by: FAMILY MEDICINE

## 2025-03-21 PROCEDURE — 36415 COLL VENOUS BLD VENIPUNCTURE: CPT

## 2025-03-21 PROCEDURE — 1160F RVW MEDS BY RX/DR IN RCRD: CPT | Performed by: FAMILY MEDICINE

## 2025-03-21 PROCEDURE — 85025 COMPLETE CBC W/AUTO DIFF WBC: CPT

## 2025-03-21 PROCEDURE — 1111F DSCHRG MED/CURRENT MED MERGE: CPT | Performed by: FAMILY MEDICINE

## 2025-03-21 PROCEDURE — 82607 VITAMIN B-12: CPT
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
## 2025-04-07 PROBLEM — L85.3 XEROSIS CUTIS: Status: ACTIVE | Noted: 2017-12-13

## 2025-04-07 PROBLEM — I47.10 SUPRAVENTRICULAR TACHYCARDIA (HCC): Status: ACTIVE | Noted: 2023-02-08

## 2025-04-07 PROBLEM — R00.2 PALPITATIONS: Status: ACTIVE | Noted: 2023-02-08

## 2025-04-07 PROBLEM — F23 PSYCHOSIS, BRIEF REACTIVE (HCC): Status: ACTIVE | Noted: 2023-04-17

## 2025-04-07 PROBLEM — N17.9 ARF (ACUTE RENAL FAILURE): Status: ACTIVE | Noted: 2025-04-07

## 2025-04-07 PROBLEM — E78.00 HYPERCHOLESTEROLEMIA: Status: ACTIVE | Noted: 2023-02-08

## 2025-04-07 PROBLEM — N26.1 RENAL ATROPHY, BILATERAL: Status: ACTIVE | Noted: 2023-04-18

## 2025-04-07 PROBLEM — R60.0 EDEMA OF EXTREMITIES: Status: ACTIVE | Noted: 2023-02-08

## 2025-04-07 PROBLEM — I25.5 ISCHEMIC CONGESTIVE CARDIOMYOPATHY (HCC): Status: ACTIVE | Noted: 2023-04-17

## 2025-04-07 PROBLEM — I42.0 ISCHEMIC CONGESTIVE CARDIOMYOPATHY (HCC): Status: ACTIVE | Noted: 2023-04-17

## 2025-04-07 PROBLEM — D47.1 PULMONARY HYPERTENSION DUE TO MYELOPROLIFERATIVE DISORDER (HCC): Status: ACTIVE | Noted: 2023-02-21

## 2025-04-07 PROBLEM — R94.31 ABNORMAL EKG: Status: ACTIVE | Noted: 2023-02-08

## 2025-04-07 PROBLEM — M72.2 PLANTAR FASCIITIS: Status: ACTIVE | Noted: 2017-12-13

## 2025-04-07 PROBLEM — I27.29 PULMONARY HYPERTENSION DUE TO MYELOPROLIFERATIVE DISORDER (HCC): Status: ACTIVE | Noted: 2023-02-21

## 2025-04-07 PROBLEM — R79.89 ELEVATED TROPONIN: Status: ACTIVE | Noted: 2025-04-07

## 2025-04-07 PROBLEM — N13.30 BILATERAL HYDRONEPHROSIS: Status: ACTIVE | Noted: 2023-04-18

## 2025-04-07 PROBLEM — R07.9 CHEST PAIN, UNSPECIFIED: Status: ACTIVE | Noted: 2023-02-08

## 2025-04-07 PROBLEM — F10.21 ALCOHOL DEPENDENCE IN REMISSION (HCC): Status: ACTIVE | Noted: 2023-04-17

## 2025-04-07 PROBLEM — R06.09 CHRONIC DYSPNEA: Status: ACTIVE | Noted: 2023-02-08

## 2025-04-07 PROBLEM — S12.9XXS: Status: ACTIVE | Noted: 2023-02-08

## 2025-04-07 PROBLEM — R42 LIGHTHEADED: Status: ACTIVE | Noted: 2023-02-08

## 2025-04-11 ENCOUNTER — TELEPHONE (OUTPATIENT)
Dept: FAMILY MEDICINE CLINIC | Facility: CLINIC | Age: 80
End: 2025-04-11

## 2025-04-16 ENCOUNTER — LAB ENCOUNTER (OUTPATIENT)
Dept: LAB | Age: 80
End: 2025-04-16
Attending: UROLOGY
Payer: MEDICARE

## 2025-04-16 DIAGNOSIS — K92.2 GASTROINTESTINAL HEMORRHAGE, UNSPECIFIED GASTROINTESTINAL HEMORRHAGE TYPE: ICD-10-CM

## 2025-04-16 LAB
BASOPHILS # BLD AUTO: 0.08 X10(3) UL (ref 0–0.2)
BASOPHILS NFR BLD AUTO: 0.8 %
EOSINOPHIL # BLD AUTO: 0.56 X10(3) UL (ref 0–0.7)
EOSINOPHIL NFR BLD AUTO: 5.6 %
ERYTHROCYTE [DISTWIDTH] IN BLOOD BY AUTOMATED COUNT: 13.6 %
HCT VFR BLD AUTO: 34.8 % (ref 39–53)
HGB BLD-MCNC: 11.2 G/DL (ref 13–17.5)
IMM GRANULOCYTES # BLD AUTO: 0.03 X10(3) UL (ref 0–1)
IMM GRANULOCYTES NFR BLD: 0.3 %
LYMPHOCYTES # BLD AUTO: 2.27 X10(3) UL (ref 1–4)
LYMPHOCYTES NFR BLD AUTO: 22.8 %
MCH RBC QN AUTO: 32.5 PG (ref 26–34)
MCHC RBC AUTO-ENTMCNC: 32.2 G/DL (ref 31–37)
MCV RBC AUTO: 100.9 FL (ref 80–100)
MONOCYTES # BLD AUTO: 0.9 X10(3) UL (ref 0.1–1)
MONOCYTES NFR BLD AUTO: 9 %
NEUTROPHILS # BLD AUTO: 6.11 X10 (3) UL (ref 1.5–7.7)
NEUTROPHILS # BLD AUTO: 6.11 X10(3) UL (ref 1.5–7.7)
NEUTROPHILS NFR BLD AUTO: 61.5 %
PLATELET # BLD AUTO: 210 10(3)UL (ref 150–450)
RBC # BLD AUTO: 3.45 X10(6)UL (ref 3.8–5.8)
WBC # BLD AUTO: 10 X10(3) UL (ref 4–11)

## 2025-04-16 PROCEDURE — 85025 COMPLETE CBC W/AUTO DIFF WBC: CPT

## 2025-04-16 PROCEDURE — 36415 COLL VENOUS BLD VENIPUNCTURE: CPT

## 2025-04-30 ENCOUNTER — TELEPHONE (OUTPATIENT)
Dept: FAMILY MEDICINE CLINIC | Facility: CLINIC | Age: 80
End: 2025-04-30

## 2025-05-05 DIAGNOSIS — G30.9 ALZHEIMER'S DEMENTIA WITH BEHAVIORAL DISTURBANCE (HCC): ICD-10-CM

## 2025-05-05 DIAGNOSIS — F02.818 ALZHEIMER'S DEMENTIA WITH BEHAVIORAL DISTURBANCE (HCC): ICD-10-CM

## 2025-05-06 RX ORDER — MEMANTINE HYDROCHLORIDE 5 MG/1
5 TABLET ORAL 2 TIMES DAILY
Qty: 180 TABLET | Refills: 0 | OUTPATIENT
Start: 2025-05-06

## 2025-05-08 ENCOUNTER — TELEPHONE (OUTPATIENT)
Dept: FAMILY MEDICINE CLINIC | Facility: CLINIC | Age: 80
End: 2025-05-08

## 2025-05-08 NOTE — TELEPHONE ENCOUNTER
ZAID Antonio, [facility] [phone]    Patient will be discharged from  tomorrow, all goals have been met

## 2025-05-13 DIAGNOSIS — G30.9 ALZHEIMER'S DEMENTIA WITH BEHAVIORAL DISTURBANCE (HCC): ICD-10-CM

## 2025-05-13 DIAGNOSIS — F02.818 ALZHEIMER'S DEMENTIA WITH BEHAVIORAL DISTURBANCE (HCC): ICD-10-CM

## 2025-05-13 RX ORDER — MEMANTINE HYDROCHLORIDE 5 MG/1
5 TABLET ORAL 2 TIMES DAILY
Qty: 180 TABLET | Refills: 0 | Status: SHIPPED | OUTPATIENT
Start: 2025-05-13

## 2025-05-13 NOTE — TELEPHONE ENCOUNTER
Medication: MEMANTINE 5 MG Oral Tab      Date of last refill: 10/22/2024 (#180/1)  [place]     Last office visit: 03/06/2024  Due back to clinic per last office note:  Around 03/06/2025  Date next office visit scheduled:    Future Appointments   Date Time Provider Department Center   5/16/2025 11:30 AM Juan Jose Perez MD [facility] [address] [facility] [address]           Last OV note recommendation:    Assessment:       ICD-10-CM     1. Alzheimer's dementia with behavioral disturbance (HCC)  G30.9       F02.818            Neuropsych test suggested major neurocognitive disorder with vascular feature, frontal lobe declining   He has improved and been stable on current meds     Plan:  Cont namenda 5 mg bid, may increase to 10 mg in the future  Cont depakote, olanzapine per psychiatrist  Fall precaution  See orders and medications filed with this encounter. The patient indicates understanding of these issues and agrees with the plan.  Discussed with patient/family regarding assessment, care plan   RTC 1 year  Pt should go ER for any new or worsening symptoms and contact office

## 2025-05-16 ENCOUNTER — LAB ENCOUNTER (OUTPATIENT)
Dept: LAB | Age: 80
End: 2025-05-16
Attending: FAMILY MEDICINE
Payer: MEDICARE

## 2025-05-16 ENCOUNTER — OFFICE VISIT (OUTPATIENT)
Dept: FAMILY MEDICINE CLINIC | Facility: CLINIC | Age: 80
End: 2025-05-16
Payer: MEDICARE

## 2025-05-16 VITALS
OXYGEN SATURATION: 99 % | BODY MASS INDEX: 28.04 KG/M2 | HEIGHT: 68 IN | HEART RATE: 58 BPM | DIASTOLIC BLOOD PRESSURE: 76 MMHG | RESPIRATION RATE: 17 BRPM | SYSTOLIC BLOOD PRESSURE: 130 MMHG | WEIGHT: 185 LBS

## 2025-05-16 DIAGNOSIS — K92.2 GASTROINTESTINAL HEMORRHAGE, UNSPECIFIED GASTROINTESTINAL HEMORRHAGE TYPE: ICD-10-CM

## 2025-05-16 DIAGNOSIS — R20.8 BURNING SENSATION OF FOOT: Primary | ICD-10-CM

## 2025-05-16 DIAGNOSIS — E11.65 TYPE 2 DIABETES MELLITUS WITH HYPERGLYCEMIA, UNSPECIFIED WHETHER LONG TERM INSULIN USE (HCC): ICD-10-CM

## 2025-05-16 DIAGNOSIS — K92.1 MELENA: ICD-10-CM

## 2025-05-16 LAB
BASOPHILS # BLD AUTO: 0.06 X10(3) UL (ref 0–0.2)
BASOPHILS NFR BLD AUTO: 0.6 %
EOSINOPHIL # BLD AUTO: 0.61 X10(3) UL (ref 0–0.7)
EOSINOPHIL NFR BLD AUTO: 6.1 %
ERYTHROCYTE [DISTWIDTH] IN BLOOD BY AUTOMATED COUNT: 13 %
HCT VFR BLD AUTO: 34.6 % (ref 39–53)
HGB BLD-MCNC: 11.3 G/DL (ref 13–17.5)
IMM GRANULOCYTES # BLD AUTO: 0.02 X10(3) UL (ref 0–1)
IMM GRANULOCYTES NFR BLD: 0.2 %
LYMPHOCYTES # BLD AUTO: 2.56 X10(3) UL (ref 1–4)
LYMPHOCYTES NFR BLD AUTO: 25.5 %
MCH RBC QN AUTO: 32.1 PG (ref 26–34)
MCHC RBC AUTO-ENTMCNC: 32.7 G/DL (ref 31–37)
MCV RBC AUTO: 98.3 FL (ref 80–100)
MONOCYTES # BLD AUTO: 0.87 X10(3) UL (ref 0.1–1)
MONOCYTES NFR BLD AUTO: 8.7 %
NEUTROPHILS # BLD AUTO: 5.9 X10 (3) UL (ref 1.5–7.7)
NEUTROPHILS # BLD AUTO: 5.9 X10(3) UL (ref 1.5–7.7)
NEUTROPHILS NFR BLD AUTO: 58.9 %
PLATELET # BLD AUTO: 206 10(3)UL (ref 150–450)
RBC # BLD AUTO: 3.52 X10(6)UL (ref 3.8–5.8)
WBC # BLD AUTO: 10 X10(3) UL (ref 4–11)

## 2025-05-16 PROCEDURE — 36415 COLL VENOUS BLD VENIPUNCTURE: CPT

## 2025-05-16 PROCEDURE — 1159F MED LIST DOCD IN RCRD: CPT | Performed by: FAMILY MEDICINE

## 2025-05-16 PROCEDURE — 85025 COMPLETE CBC W/AUTO DIFF WBC: CPT

## 2025-05-16 PROCEDURE — 1160F RVW MEDS BY RX/DR IN RCRD: CPT | Performed by: FAMILY MEDICINE

## 2025-05-16 PROCEDURE — 99213 OFFICE O/P EST LOW 20 MIN: CPT | Performed by: FAMILY MEDICINE

## 2025-05-16 RX ORDER — GABAPENTIN 300 MG/1
300 CAPSULE ORAL NIGHTLY
Qty: 30 CAPSULE | Refills: 1 | Status: SHIPPED | OUTPATIENT
Start: 2025-05-16

## 2025-05-16 NOTE — PROGRESS NOTES
[facility] Progress Note    SUBJECTIVE: Leon Iniguez 79 year old male is here today for   Chief Complaint   Patient presents with    Leg Pain     Mostly bottom of R foot, pins and needles, burning sensation. Traveling up leg       Still wakes up in the morning with heels and ankles with burning, pins and needles, and sharp pain. Once up and walking will go away. But wakes up every morning to pain and sometimes at night.    Sore in buttock had looked like resolving but recurred      PMH  Past Medical History[1]     PSH  Past Surgical History[2]     Social Hx:  No changes    ROS  See HPI    OBJECTIVE:  /76 (BP Location: Left arm, Patient Position: Sitting, Cuff Size: adult)   Pulse 58   Resp 17   Ht 5' 8\" (1.727 m)   Wt 185 lb (83.9 kg)   SpO2 99%   BMI 28.13 kg/m²     Exam    Bilateral barefoot skin diabetic exam is normal, visualized feet and the appearance is normal.  Bilateral monofilament/sensation of both feet is normal.  Pulsation pedal pulse exam of both lower legs/feet is normal as well.        Labs:          Meds:   Current Medications[3]      Assessment/Plan  Leon was seen today for leg pain.    Diagnoses and all orders for this visit:    Burning sensation of foot  -     gabapentin 300 MG Oral Cap; Take 1 capsule (300 mg total) by mouth nightly.    Type 2 diabetes mellitus with hyperglycemia, unspecified whether long term insulin use (HCC)     Will treat as neuropathic pain    Will get microalbumin         Total Time spent with patient and coordinating care:  15 minutes.    Follow up: as needed or 2-3 months      Juan Jose Perez MD         [1]   Past Medical History:   Aortic stenosis    Arthritis    Back pain    Back problem    Cancer (HCC)    Change in hair    CHF (congestive heart failure) (HCC)    Coronary atherosclerosis    Dementia (HCC)    Diabetes (HCC)    Essential hypertension    Hearing impairment    High blood pressure    High cholesterol    History of colon cancer    History  of hemicolectomy    Odontoid fracture with routine healing    Pain in joints    Stented coronary artery    Visual impairment   [2]   Past Surgical History:  Procedure Laterality Date    Colectomy      part of colon taken   [3]   Current Outpatient Medications   Medication Sig Dispense Refill    gabapentin 300 MG Oral Cap Take 1 capsule (300 mg total) by mouth nightly. 30 capsule 1    MEMANTINE 5 MG Oral Tab TAKE ONE TABLET BY MOUTH TWICE DAILY 180 tablet 0    pantoprazole 40 MG Oral Tab EC Take 1 tablet (40 mg total) by mouth 2 (two) times daily before meals. 180 tablet 0    carvedilol 12.5 MG Oral Tab Take 1 tablet (12.5 mg total) by mouth 2 (two) times daily with meals.      Polyethylene Glycol 3350 17 g Oral Powd Pack Take 17 g by mouth at bedtime.      divalproex  MG Oral Tab EC DR tab Take 1 tablet (250 mg total) by mouth every evening.      OLANZapine 5 MG Oral Tab Take 1 tablet (5 mg total) by mouth nightly.      aspirin 81 MG Oral Tab EC Take 1 tablet (81 mg total) by mouth daily. 30 tablet 11    atorvastatin 40 MG Oral Tab Take 1 tablet (40 mg total) by mouth nightly. 30 tablet 3    clopidogrel 75 MG Oral Tab Take 1 tablet (75 mg total) by mouth daily. 30 tablet 11    pantoprazole 40 MG Oral Tab EC Take one tablet (40 mg total) by mouth once daily, 30 minutes prior to breakfast. (Patient not taking: Reported on 5/16/2025) 90 tablet 3

## 2025-06-06 ENCOUNTER — TELEPHONE (OUTPATIENT)
Dept: FAMILY MEDICINE CLINIC | Facility: CLINIC | Age: 80
End: 2025-06-06

## 2025-06-06 NOTE — TELEPHONE ENCOUNTER
Spoke to wife, [facility] ordered a walker, Keyla    Wife will contact [facility]

## 2025-06-06 NOTE — TELEPHONE ENCOUNTER
Pt wife calling regarding a walker for her .   She states she received something from [employer]   Telling her to call her doctor that the walker has been authorized     Please advise

## 2025-06-13 ENCOUNTER — TELEPHONE (OUTPATIENT)
Dept: FAMILY MEDICINE CLINIC | Facility: CLINIC | Age: 80
End: 2025-06-13

## 2025-06-13 DIAGNOSIS — M79.2 NEUROPATHIC PAIN: ICD-10-CM

## 2025-06-13 DIAGNOSIS — G57.93 NEUROPATHIC PAIN OF BOTH FEET: Primary | ICD-10-CM

## 2025-06-13 RX ORDER — METHYLPREDNISOLONE 4 MG/1
TABLET ORAL
Qty: 1 EACH | Refills: 0 | Status: SHIPPED | OUTPATIENT
Start: 2025-06-13 | End: 2025-06-13

## 2025-06-13 RX ORDER — METHYLPREDNISOLONE 4 MG/1
TABLET ORAL
Qty: 21 TABLET | Refills: 0 | Status: SHIPPED | OUTPATIENT
Start: 2025-06-13

## 2025-06-13 NOTE — TELEPHONE ENCOUNTER
Seeing as it is flaring up, I would consider a steroid course, if he hasn't had issues, and I don't believe he would send in medrol dose pack to be take as ataper with breakfast (6 tabs first day, then 5 tabs following, then 4 tabs, until on 6th day takes 1 tab.)    Juan Jose Perez MD

## 2025-06-13 NOTE — TELEPHONE ENCOUNTER
Patients wife called and stated he saw [facility] in office on 5/16 and was prescribed    gabapentin 300 MG Oral Cap   For his symptoms    And he's been taking it, but he's now having A LOT of pain in both feet & tingling and burning now; wife gave him 1 extra strength tylenol this morning 8:00 am and 1 at 10:00 am. Please advise on what they should do from here.

## 2025-06-13 NOTE — TELEPHONE ENCOUNTER
Called wife and left detailed message regarding new steroid medication that Dr. Perez prescribed. Rx sent to pharmacy.

## 2025-07-01 DIAGNOSIS — R20.8 BURNING SENSATION OF FOOT: ICD-10-CM

## 2025-07-01 RX ORDER — GABAPENTIN 300 MG/1
300 CAPSULE ORAL NIGHTLY
Qty: 30 CAPSULE | Refills: 0 | Status: SHIPPED | OUTPATIENT
Start: 2025-07-01

## 2025-07-13 DIAGNOSIS — R20.8 BURNING SENSATION OF FOOT: ICD-10-CM

## 2025-07-14 RX ORDER — GABAPENTIN 300 MG/1
300 CAPSULE ORAL NIGHTLY
Qty: 30 CAPSULE | Refills: 0 | Status: SHIPPED | OUTPATIENT
Start: 2025-07-14

## 2025-07-28 ENCOUNTER — TELEPHONE (OUTPATIENT)
Dept: PHYSICAL THERAPY | Facility: HOSPITAL | Age: 80
End: 2025-07-28

## 2025-08-04 ENCOUNTER — LAB ENCOUNTER (OUTPATIENT)
Dept: LAB | Age: 80
End: 2025-08-04
Attending: FAMILY MEDICINE

## 2025-08-04 ENCOUNTER — OFFICE VISIT (OUTPATIENT)
Dept: FAMILY MEDICINE CLINIC | Facility: CLINIC | Age: 80
End: 2025-08-04

## 2025-08-04 VITALS
WEIGHT: 185 LBS | BODY MASS INDEX: 28 KG/M2 | OXYGEN SATURATION: 98 % | RESPIRATION RATE: 18 BRPM | SYSTOLIC BLOOD PRESSURE: 128 MMHG | HEART RATE: 80 BPM | DIASTOLIC BLOOD PRESSURE: 84 MMHG

## 2025-08-04 DIAGNOSIS — E11.69 TYPE 2 DIABETES MELLITUS WITH OTHER SPECIFIED COMPLICATION, WITHOUT LONG-TERM CURRENT USE OF INSULIN (HCC): ICD-10-CM

## 2025-08-04 DIAGNOSIS — N18.4 STAGE 4 CHRONIC KIDNEY DISEASE (HCC): ICD-10-CM

## 2025-08-04 DIAGNOSIS — Z99.89 USES WALKER: ICD-10-CM

## 2025-08-04 DIAGNOSIS — E11.65 TYPE 2 DIABETES MELLITUS WITH HYPERGLYCEMIA, UNSPECIFIED WHETHER LONG TERM INSULIN USE (HCC): ICD-10-CM

## 2025-08-04 DIAGNOSIS — R26.81 GAIT INSTABILITY: ICD-10-CM

## 2025-08-04 DIAGNOSIS — J41.0 SMOKERS' COUGH (HCC): ICD-10-CM

## 2025-08-04 DIAGNOSIS — G57.93 NEUROPATHIC PAIN OF BOTH FEET: ICD-10-CM

## 2025-08-04 DIAGNOSIS — E11.69 TYPE 2 DIABETES MELLITUS WITH OTHER SPECIFIED COMPLICATION, WITHOUT LONG-TERM CURRENT USE OF INSULIN (HCC): Primary | ICD-10-CM

## 2025-08-04 PROBLEM — I27.29 PULMONARY HYPERTENSION DUE TO MYELOPROLIFERATIVE DISORDER (HCC): Status: RESOLVED | Noted: 2023-02-21 | Resolved: 2025-08-04

## 2025-08-04 PROBLEM — F23 PSYCHOSIS, BRIEF REACTIVE (HCC): Status: RESOLVED | Noted: 2023-04-17 | Resolved: 2025-08-04

## 2025-08-04 PROBLEM — D47.1 PULMONARY HYPERTENSION DUE TO MYELOPROLIFERATIVE DISORDER (HCC): Status: RESOLVED | Noted: 2023-02-21 | Resolved: 2025-08-04

## 2025-08-04 LAB
ALBUMIN SERPL-MCNC: 4.2 G/DL (ref 3.2–4.8)
ALBUMIN/GLOB SERPL: 1.4 (ref 1–2)
ALP LIVER SERPL-CCNC: 64 U/L (ref 45–117)
ALT SERPL-CCNC: 8 U/L (ref 10–49)
ANION GAP SERPL CALC-SCNC: 8 MMOL/L (ref 0–18)
AST SERPL-CCNC: 15 U/L (ref ?–34)
BILIRUB SERPL-MCNC: 0.5 MG/DL (ref 0.2–1.1)
BUN BLD-MCNC: 36 MG/DL (ref 9–23)
CALCIUM BLD-MCNC: 9.6 MG/DL (ref 8.7–10.6)
CHLORIDE SERPL-SCNC: 105 MMOL/L (ref 98–112)
CO2 SERPL-SCNC: 30 MMOL/L (ref 21–32)
CREAT BLD-MCNC: 1.83 MG/DL (ref 0.7–1.3)
EGFRCR SERPLBLD CKD-EPI 2021: 37 ML/MIN/1.73M2 (ref 60–?)
FASTING STATUS PATIENT QL REPORTED: NO
GLOBULIN PLAS-MCNC: 2.9 G/DL (ref 2–3.5)
GLUCOSE BLD-MCNC: 124 MG/DL (ref 70–99)
OSMOLALITY SERPL CALC.SUM OF ELEC: 306 MOSM/KG (ref 275–295)
POTASSIUM SERPL-SCNC: 5.1 MMOL/L (ref 3.5–5.1)
PROT SERPL-MCNC: 7.1 G/DL (ref 5.7–8.2)
SODIUM SERPL-SCNC: 143 MMOL/L (ref 136–145)

## 2025-08-04 PROCEDURE — 99213 OFFICE O/P EST LOW 20 MIN: CPT | Performed by: FAMILY MEDICINE

## 2025-08-04 PROCEDURE — 80053 COMPREHEN METABOLIC PANEL: CPT

## 2025-08-04 PROCEDURE — 1160F RVW MEDS BY RX/DR IN RCRD: CPT | Performed by: FAMILY MEDICINE

## 2025-08-04 PROCEDURE — 36415 COLL VENOUS BLD VENIPUNCTURE: CPT

## 2025-08-04 PROCEDURE — 1159F MED LIST DOCD IN RCRD: CPT | Performed by: FAMILY MEDICINE

## 2025-08-09 DIAGNOSIS — F02.818 ALZHEIMER'S DEMENTIA WITH BEHAVIORAL DISTURBANCE (HCC): ICD-10-CM

## 2025-08-09 DIAGNOSIS — G30.9 ALZHEIMER'S DEMENTIA WITH BEHAVIORAL DISTURBANCE (HCC): ICD-10-CM

## 2025-08-11 RX ORDER — MEMANTINE HYDROCHLORIDE 5 MG/1
5 TABLET ORAL 2 TIMES DAILY
Qty: 180 TABLET | Refills: 0 | Status: SHIPPED | OUTPATIENT
Start: 2025-08-11

## 2025-08-12 ENCOUNTER — TELEPHONE (OUTPATIENT)
Dept: FAMILY MEDICINE CLINIC | Facility: CLINIC | Age: 80
End: 2025-08-12

## 2025-08-13 ENCOUNTER — TELEPHONE (OUTPATIENT)
Dept: FAMILY MEDICINE CLINIC | Facility: CLINIC | Age: 80
End: 2025-08-13

## 2025-08-26 ENCOUNTER — TELEPHONE (OUTPATIENT)
Dept: FAMILY MEDICINE CLINIC | Facility: CLINIC | Age: 80
End: 2025-08-26

## 2025-08-28 DIAGNOSIS — R20.8 BURNING SENSATION OF FOOT: ICD-10-CM

## 2025-08-29 ENCOUNTER — TELEPHONE (OUTPATIENT)
Dept: PHYSICAL THERAPY | Facility: HOSPITAL | Age: 80
End: 2025-08-29

## 2025-08-29 RX ORDER — GABAPENTIN 300 MG/1
300 CAPSULE ORAL 2 TIMES DAILY
Qty: 180 CAPSULE | Refills: 0 | Status: SHIPPED | OUTPATIENT
Start: 2025-08-29

## (undated) DEVICE — WIRE AMPLATZ SUPER STIFF 035X1

## (undated) DEVICE — WIRE J .035X260

## (undated) DEVICE — GLOVE SURG TRIUMPH SZ 8

## (undated) DEVICE — CATH ANGIO 6FR PIG ANGLED

## (undated) DEVICE — FIXED CORE WIRE GUIDE SAFE-T-J, CURVED: Brand: COOK

## (undated) DEVICE — 3M™ BAIR HUGGER® UNDERBODY BLANKET, FULL ACCESS, 10 PER CASE 63500: Brand: BAIR HUGGER™

## (undated) DEVICE — CATH ANGIO 6F AL1

## (undated) DEVICE — 3M™ TEGADERM™ TRANSPARENT FILM DRESSING, 1626W, 4 IN X 4-3/4 IN (10 CM X 12 CM), 50 EACH/CARTON, 4 CARTON/CASE: Brand: 3M™ TEGADERM™

## (undated) DEVICE — BITEBLOCK ENDOSCP 60FR MAXI STRP

## (undated) DEVICE — STERILE POLYISOPRENE POWDER-FREE SURGICAL GLOVES: Brand: PROTEXIS

## (undated) DEVICE — COVER,TABLE,44X90,STERILE: Brand: MEDLINE

## (undated) DEVICE — V2 SPECIMEN COLLECTION MANIFOLD KIT: Brand: NEPTUNE

## (undated) DEVICE — GAUZE SPONGES,12 PLY: Brand: CURITY

## (undated) DEVICE — STANDARD HYPODERMIC NEEDLE,POLYPROPYLENE HUB: Brand: MONOJECT

## (undated) DEVICE — CATH ANGIO 6F AL2

## (undated) DEVICE — 1010 S-DRAPE TOWEL DRAPE 10/BX: Brand: STERI-DRAPE™

## (undated) DEVICE — WIRE NAMIC STR 035X150

## (undated) DEVICE — REM POLYHESIVE ADULT PATIENT RETURN ELECTRODE: Brand: VALLEYLAB

## (undated) DEVICE — PINNACLE INTRODUCER SHEATH: Brand: PINNACLE

## (undated) DEVICE — 3M™ IOBAN™ 2 ANTIMICROBIAL INCISE DRAPE 6651EZ: Brand: IOBAN™ 2

## (undated) DEVICE — KIT CUSTOM ENDOPROCEDURE STERIS

## (undated) DEVICE — TIBURON DRAPE TOWELS: Brand: CONVERTORS

## (undated) DEVICE — SAVVYWIRE X-SMALL 2.9 X 3.2CM

## (undated) DEVICE — SUT SILK 0 FSL 678G

## (undated) DEVICE — 3M™ RED DOT™ MONITORING ELECTRODE WITH FOAM TAPE AND STICKY GEL, 50/BAG, 20/CASE, 72/PLT 2570: Brand: RED DOT™

## (undated) DEVICE — CSTM UNIVERSAL DRAPE PK: Brand: MEDLINE INDUSTRIES, INC.

## (undated) DEVICE — ANGIO-SEAL VIP VASCULAR CLOSURE DEVICE: Brand: ANGIO-SEAL

## (undated) DEVICE — TOWEL SURG OR 17X30IN BLUE

## (undated) DEVICE — 10FT COMBINED O2 DELIVERY/CO2 MONITORING. FILTER WITH MICROSTREAM TYPE LUER: Brand: DUAL ADULT NASAL CANNULA

## (undated) DEVICE — SOLUTION SURG DURAPREP 26ML

## (undated) DEVICE — KIT VLV 5 PC AIR H2O SUCT BX ENDOGATOR CONN

## (undated) DEVICE — ANGIO PACK-LF: Brand: MEDLINE INDUSTRIES, INC.

## (undated) DEVICE — GOWN,SIRUS,FABRIC-REINFORCED,X-LARGE: Brand: MEDLINE

## (undated) DEVICE — SPONGE PREMIERPRO 7X18X18

## (undated) DEVICE — X-RAY DETECTABLE SPONGES,16 PLY: Brand: VISTEC

## (undated) DEVICE — 1200CC GUARDIAN II: Brand: GUARDIAN

## (undated) DEVICE — SHEATH MICROPUNCTURE STIFF

## (undated) DEVICE — CATH ANGIO 6F FR4

## (undated) DEVICE — SYRINGE 30ML LL TIP

## (undated) DEVICE — Device: Brand: INTELLICART™

## (undated) DEVICE — PERCLOSE PROGLIDE™ SUTURE-MEDIATED CLOSURE SYSTEM: Brand: PERCLOSE PROGLIDE™

## (undated) DEVICE — ADULT, RADIOTRANSPARENT ELEMENT, COMPATIBLE W/ GRAY FLAT CONNECTOR: Brand: DEFIBRILLATION ELECTRODES

## (undated) NOTE — LETTER
[facility] [address], [address]  Consent for Procedure/Sedation  Date: 3/2/23           Time: 1010    1. I hereby authorize Dr. Madide Chin, my physician and his/her assistants (if applicable), which may include medical students, residents, and/or fellows, to perform the following surgical operation/ procedure and administer such anesthesia as may be determined necessary by my physician:  Operation/Procedure name (s)  Cardiac Catheterization, Left Ventricular Cineangiography, Bilateral Selective Coronary Angiography and/or Right Heart Catheterization; possible Percutaneous Transluminal Coronary Angioplasty, Coronary Atherectomy, Coronary Stent, Intracoronary Thrombolytic therapy, Antiplatelet therapy and/or Intravascular Ultrasound on Michelle Wolf   2. I recognize that during the surgical operation/procedure, unforeseen conditions may necessitate additional or different procedures than those listed above. I, therefore, further authorize and request that the above-named surgeon, assistants, or designees perform such procedures as are, in their judgment, necessary and desirable. 3.   My surgeon/physician has discussed prior to my surgery the potential benefits, risks and side effects of this procedure; the likelihood of achieving goals; and potential problems that might occur during recuperation. They also discussed reasonable alternatives to the procedure, including risks, benefits, and side effects related to the alternatives and risks related to not receiving this procedure. I have had all my questions answered and I acknowledge that no guarantee has been made as to the result that may be obtained. 4.   Should the need arise during my operation/procedure, which includes change of level of care prior to discharge, I also consent to the administration of blood and/or blood products.   Further, I understand that despite careful testing and screening of blood or blood products by collecting agencies, I may still be subject to ill effects as a result of receiving a blood transfusion and/or blood products. The following are some, but not all, of the potential risks that can occur: fever and allergic reactions, hemolytic reactions, transmission of diseases such as Hepatitis, AIDS and Cytomegalovirus (CMV) and fluid overload. In the event that I wish to have an autologous transfusion of my own blood, or a directed donor transfusion, I will discuss this with my physician. Check only if Refusing Blood or Blood Products  I understand refusal of blood or blood products as deemed necessary by my physician may have serious consequences to my condition to include possible death. I hereby assume responsibility for my refusal and release the hospital, its personnel, and my physicians from any responsibility for the consequences of my refusal.          o  Refuse      5. I authorize the use of any specimen, organs, tissues, body parts or foreign objects that may be removed from my body during the operation/procedure for diagnosis, research or teaching purposes and their subsequent disposal by hospital authorities. I also authorize the release of specimen test results and/or written reports to my treating physician on the hospital medical staff or other referring or consulting physicians involved in my care, at the discretion of the Pathologist or my treating physician. 6.   I consent to the photographing or videotaping of the operations or procedures to be performed, including appropriate portions of my body for medical, scientific, or educational purposes, provided my identity is not revealed by the pictures or by descriptive texts accompanying them. If the procedure has been photographed/videotaped, the surgeon will obtain the original picture, image, videotape or CD.   The hospital will not be responsible for storage, release or maintenance of the picture, image, tape or CD.    7.   I consent to the presence of a  or observers in the operating room as deemed necessary by my physician or their designees. 8.   I recognize that in the event my procedure results in extended X-Ray/fluoroscopy time, I may develop a skin reaction. 9. If I have a Do Not Attempt Resuscitation (DNAR) order in place, that status will be suspended while in the operating room, procedural suite, and during the recovery period unless otherwise explicitly stated by me (or a person authorized to consent on my behalf). The surgeon or my attending physician will determine when the applicable recovery period ends for purposes of reinstating the DNAR order. 10. Patients having a sterilization procedure: I understand that if the procedure is successful the results will be permanent and it will therefore be impossible for me to inseminate, conceive, or bear children. I also understand that the procedure is intended to result in sterility, although the result has not been guaranteed. 11. I acknowledge that my physician has explained sedation/analgesia administration to me including the risk and benefits I consent to the administration of sedation/analgesia as may be necessary or desirable in the judgment of my physician.     I CERTIFY THAT I HAVE READ AND FULLY UNDERSTAND THE ABOVE CONSENT TO OPERATION and/or OTHER PROCEDURE.        ____________________________________       _________________________________      ______________________________  Signature of Patient         Signature of Responsible Person        Printed Name of Responsible Person    ____________________________________      _________________________________      ______________________________       Signature of Witness          Relationship to Patient                       Date                                       Time  Patient Name: Richardson Orellana     : 1945                 Printed: 2023      Medical Record #: [id number] Page 1 of 2

## (undated) NOTE — MR AVS SNAPSHOT
After Visit Summary   2023    Nohelia Smith   MRN: [id number]           Visit Information     Date & Time  2023  1:30 PM Provider  [address] [place] [address] EEG Dept. Phone  [phone]      Allergies as of 2023  Review status set to Review Complete on 2023       Noted Reaction Type Reactions    Lisinopril 2023    ANAPHYLAXIS      Your Current Medications        Dosage    tiZANidine 2 MG Oral Tab Take 1 tablet (2 mg total) by mouth nightly. May take 2mg tablet twice during daytime if no excessive drowsiness is felt. methylPREDNISolone (MEDROL) 4 MG Oral Tablet Therapy Pack As directed. carvedilol 3.125 MG Oral Tab Take 1 tablet (3.125 mg total) by mouth 2 (two) times daily with meals. Multiple Vitamin (MULTIVITAMIN ADULT OR) Take by mouth. Simethicone (GAS-X OR)     MELATONIN OR Take by mouth. furosemide 20 MG Oral Tab Take 1 tablet (20 mg total) by mouth daily. hydrALAZINE 50 MG Oral Tab Take 1 tablet (50 mg total) by mouth in the morning and 1 tablet (50 mg total) before bedtime. tamsulosin 0.4 MG Oral Cap () Take 1 capsule (0.4 mg total) by mouth daily. Polyethylene Glycol 3350 17 g Oral Powd Pack Take 17 g by mouth daily as needed (If no bowel movement in last 24 hours). Sennosides 17.2 MG Oral Tab Take 1 tablet (17.2 mg total) by mouth nightly as needed (constipation, as needed if no bowel movement that day). aspirin 81 MG Oral Tab EC Take 1 tablet (81 mg total) by mouth daily. atorvastatin 40 MG Oral Tab Take 1 tablet (40 mg total) by mouth nightly. clopidogrel 75 MG Oral Tab Take 1 tablet (75 mg total) by mouth daily. isosorbide dinitrate 5 MG Oral Tab Take 1 tablet (5 mg total) by mouth 3x Daily Cardiac.       Diagnoses for This Visit    Acute encephalopathy   [996816]             We Ordered the Following     Normal Orders This Visit    EEG [NEU4 [address]       Future Appointments        Provider Department    2023 3:15 PM [facility], [address] in [place]    8/21/2023 1:15 PM [facility], [address] in [place]    8/24/2023 1:15 PM [facility], [address] in [place]    9/5/2023 11:45 AM [facility] MR RM4 (3T WIDE) [facility] MRI    9/5/2023 12:30 PM [facility] MR RM4 (3T WIDE) [facility] MRI    11/21/2023 9:15 AM [facility] [place]    1/24/2024 1:00  [facility], [address] [place] [address] Rd Nephrology                Did you know that [facility] primary care physicians now offer Video Visits through [address] for adult patients for a variety of conditions such as allergies, back pain and cold symptoms? Skip the drive and waiting room and online chat with a doctor face-to-face using your web-cam enabled computer or mobile device wherever you are. Video Visits cost $50 and can be paid hassle-free using a credit, debit, or health savings card. Not active on [employer]? Ask us how to get signed up today! If you receive a survey from [employer], please take a few minutes to complete it and provide feedback. We strive to deliver the best patient experience and are looking for ways to make improvements. Your feedback will help us do so. For more information on [facility], please visit [email or web address]           No text in SmartText           No text in SmartText

## (undated) NOTE — LETTER
[facility]  [address] [facility] [address]  [phone]    Blood Transfusion Consent    In the course of your treatment, it may become necessary to administer a transfusion of blood or blood components. This form provides basic information concerning this procedure and, if signed by you, authorizes its administration. By signing this form, you agree that all of your questions about the administration of blood or blood products have been answered by the ordering medical professional or designee.    Description of Procedure  Blood is introduced into one of your veins, commonly in the arm, using a sterilized disposable needle. The amount of blood transfused, and whether the transfusion will be of blood or blood components is a judgement the physician will make based on your particular needs.    Risks  The transfusion is a common procedure of low risk.  MINOR AND TEMPORARY REACTIONS ARE NOT UNCOMMON, including a slight bruise, swelling or local reaction in the area where the needle pierces your skin, or a nonserious reaction to the transfused material itself, including headache, fever or mild skin reaction, such as rash.  Serious reactions are possible, though very unlikely, and include severe allergic reaction (shock) and destruction (hemolysis) of transfused blood cells.  Infectious diseases which are known to be transmitted by blood transfusion include certain types of viral Hepatitis(liver infection from a virus), Human Immunodeficiency Virus (HIV-1,2) infection, a viral infection known to cause Acquired Immunodeficiency Syndrome (AIDS), as well as certain other bacterial, viral, and parasitic diseases. While a minimal risk of acquiring an infectious disease from transfused blood exists, in accordance with the Federal and State law, all due care has been taken in donor selection and testing to avoid transmission of disease.    Alternatives  If loss of blood poses serious threats during your  treatment, THERE IS NO EFFECTIVE ALTERNATIVE TO BLOOD TRANSFUSION. However, if you have any further questions on this matter, your provider will fully explain the alternatives to you if it has not already been done.    I, ______________________________, have read/had read to me the above. I understand the matters bearing on the decision whether or not to authorize a transfusion of blood or blood components. I have no questions which have not been answered to my full satisfaction. I hereby consent to such transfusion as my physician may deem necessary or advisable in the course of my treatment.    ______________________________________________                    ___________________________  (Signature of Patient or Responsible party in case of minor,                 (Printed Name of Patient or incompetent, or unconscious patient)              Responsible Party)    ___________________________               _____________________  (Relationship to Patient if not self)                                    (Date and Time)    __________________________                                                           ______________________              (Signature of Witness)               (Printed Name of Witness)     Language line ()    Telephone/Verbal/Video Consent    __________________________                     ____________________  (Signature of 2nd Witness           (Printed Name of 2nd  Telephone/Verbal/Video Consent)           Witness)    Patient Name: Leon Iniguez     : 1945                 Printed: 2025     Medical Record #: [id number]      Rev: 2023

## (undated) NOTE — LETTER
[address]  [address]  Authorization for Surgical Operation and Procedure     Date:___________                                                                                                         Time:__________  I hereby authorize Surgeon(s):  Benito Gannon DO, my physician and his/her assistants (if applicable), which may include medical students, residents, and/or fellows, to perform the following surgical operation/ procedure and administer such anesthesia as may be determined necessary by my physician:  Operation/Procedure name (s) Procedure(s):  ESOPHAGOGASTRODUODENOSCOPY (EGD) on Leon Iniguez   2.   I recognize that during the surgical operation/procedure, unforeseen conditions may necessitate additional or different procedures than those listed above.  I, therefore, further authorize and request that the above-named surgeon, assistants, or designees perform such procedures as are, in their judgment, necessary and desirable.    3.   My surgeon/physician has discussed prior to my surgery the potential benefits, risks and side effects of this procedure; the likelihood of achieving goals; and potential problems that might occur during recuperation.  They also discussed reasonable alternatives to the procedure, including risks, benefits, and side effects related to the alternatives and risks related to not receiving this procedure.  I have had all my questions answered and I acknowledge that no guarantee has been made as to the result that may be obtained.    4.   Should the need arise during my operation/procedure, which includes change of level of care prior to discharge, I also consent to the administration of blood and/or blood products.  Further, I understand that despite careful testing and screening of blood or blood products by collecting agencies, I may still be subject to ill effects as a result of receiving a blood transfusion and/or blood products.  The  following are some, but not all, of the potential risks that can occur: fever and allergic reactions, hemolytic reactions, transmission of diseases such as Hepatitis, AIDS and Cytomegalovirus (CMV) and fluid overload.  In the event that I wish to have an autologous transfusion of my own blood, or a directed donor transfusion, I will discuss this with my physician.  Check only if Refusing Blood or Blood Products  I understand refusal of blood or blood products as deemed necessary by my physician may have serious consequences to my condition to include possible death. I hereby assume responsibility for my refusal and release the hospital, its personnel, and my physicians from any responsibility for the consequences of my refusal.          o  Refuse      5.   I authorize the use of any specimen, organs, tissues, body parts or foreign objects that may be removed from my body during the operation/procedure for diagnosis, research or teaching purposes and their subsequent disposal by hospital authorities.  I also authorize the release of specimen test results and/or written reports to my treating physician on the hospital medical staff or other referring or consulting physicians involved in my care, at the discretion of the Pathologist or my treating physician.    6.   I consent to the photographing or videotaping of the operations or procedures to be performed, including appropriate portions of my body for medical, scientific, or educational purposes, provided my identity is not revealed by the pictures or by descriptive texts accompanying them.  If the procedure has been photographed/videotaped, the surgeon will obtain the original picture, image, videotape or CD.  The hospital will not be responsible for storage, release or maintenance of the picture, image, tape or CD.    7.   I consent to the presence of a  or observers in the operating room as deemed necessary by my physician or their designees.     8.   I recognize that in the event my procedure results in extended X-Ray/fluoroscopy time, I may develop a skin reaction.    9. If I have a Do Not Attempt Resuscitation (DNAR) order in place, that status will be suspended while in the operating room, procedural suite, and during the recovery period unless otherwise explicitly stated by me (or a person authorized to consent on my behalf). The surgeon or my attending physician will determine when the applicable recovery period ends for purposes of reinstating the DNAR order.  10. Patients having a sterilization procedure: I understand that if the procedure is successful the results will be permanent and it will therefore be impossible for me to inseminate, conceive, or bear children.  I also understand that the procedure is intended to result in sterility, although the result has not been guaranteed.   11. I acknowledge that my physician has explained sedation/analgesia administration to me including the risk and benefits I consent to the administration of sedation/analgesia as may be necessary or desirable in the judgment of my physician.    I CERTIFY THAT I HAVE READ AND FULLY UNDERSTAND THE ABOVE CONSENT TO OPERATION and/or OTHER PROCEDURE.    _________________________________________  __________________________________  Signature of Patient     Signature of Responsible Person         ___________________________________         Printed Name of Responsible Person           _________________________________                 Relationship to Patient  _________________________________________  ______________________________  Signature of Witness          Date  Time      Patient Name: Leon Iniguez     : 1945                 Printed: 2025     Medical Record #: [id number]                     Page [address] of [address] [place] [address]  [place]  [address]    Consent for Anesthesia    I, Leon Iniguez agree to be  cared for by an anesthesiologist, who is specially trained to monitor me and give me medicine to put me to sleep or keep me comfortable during my procedure    I understand that my anesthesiologist is not an employee or agent of [facility] or [employer]. He or she works for [employer].    As the patient asking for anesthesia services, I agree to:  Allow the anesthesiologist (anesthesia doctor) to give me medicine and do additional procedures as necessary. Some examples are: Starting or using an “IV” to give me medicine, fluids or blood during my procedure, and having a breathing tube placed to help me breathe when I’m asleep (intubation). In the event that my heart stops working properly, I understand that my anesthesiologist will make every effort to sustain my life, unless otherwise directed by [facility] Do Not Resuscitate documents.  Tell my anesthesia doctor before my procedure:  If I am pregnant.  The last time that I ate or drank.  All of the medicines I take (including prescriptions, herbal supplements, and pills I can buy without a prescription (including street drugs/illegal medications). Failure to inform my anesthesiologist about these medicines may increase my risk of anesthetic complications.  If I am allergic to anything or have had a reaction to anesthesia before.  I understand how the anesthesia medicine will help me (benefits).  I understand that with any type of anesthesia medicine there are risks:  The most common risks are: nausea, vomiting, sore throat, muscle soreness, damage to my eyes, mouth, or teeth (from breathing tube placement).  Rare risks include: remembering what happened during my procedure, allergic reactions to medications, injury to my airway, heart, lungs, vision, nerves, or muscles and in extremely rare instances death.  My doctor has explained to me other choices available to me for my care (alternatives).  Pregnant Patients  (“epidural”):  I understand that the risks of having an epidural (medicine given into my back to help control pain during labor), include itching, low blood pressure, difficulty urinating, headache or slowing of the baby’s heart. Very rare risks include infection, bleeding, seizure, irregular heart rhythms and nerve injury.  Regional Anesthesia (“spinal”, “epidural”, & “nerve blocks”):  I understand that rare but potential complications include headache, bleeding, infection, seizure, irregular heart rhythms, and nerve injury.    I can change my mind about having anesthesia services at any time before I get the medicine.    _____________________________________________________________________________  Patient (or Representative) Signature/Relationship to Patient  Date   Time    _____________________________________________________________________________   Name (if used)    Language/Organization   Time    _____________________________________________________________________________  Anesthesiologist Signature     Date   Time  I have discussed the procedure and information above with the patient (or patient’s representative) and answered their questions. The patient or their representative has agreed to have anesthesia services.    _____________________________________________________________________________  Witness        Date   Time  I have verified that the signature is that of the patient or patient’s representative, and that it was signed before the procedure  Patient Name: Leon Iniguez     : 1945                 Printed: 2025     Medical Record #: [id number]                     Page 2 of 2

## (undated) NOTE — LETTER
Date & Time: 6/12/2023, 5:35 PM  Patient: Claudia Fuller  Encounter Provider(s):    JULIOCESAR Garrison         This certifies that Cheri Carcamo, a patient at an [facility] facility, am leaving the facility voluntarily and against the advice of my physician. I acknowledge that I have been:    1. informed that my physician believes that I need to receive care here;  2. informed that if I leave, I could become sicker or even die; and  3. provided discharge instructions consistent with my current diagnosis. I hereby release my physician, the facility, and its employees from all responsibility for any ill effects which may result from this action. __________________________________  Patient or authorized caregiver signature    __________________________________  RN signature    If no patient or patient representative signature was obtained, sign below to acknowledge that the form was reviewed with the patient and that the patient refused to sign.     __________________________________  RN signature

## (undated) NOTE — LETTER
[facility] [address], [address]  Consent for Procedure/Sedation  Date: 03/07/23         Time: 1400     1. I hereby authorize Jack, my physician and his/her assistants (if applicable), which may include medical students, residents, and/or fellows, to perform the following surgical operation/ procedure and administer such anesthesia as may be determined necessary by my physician:  Operation/Procedure name (s)  Cardiac Catheterization, Left Ventricular Cineangiography, Bilateral Selective Coronary Angiography and/or Right Heart Catheterization; possible Percutaneous Transluminal Coronary Angioplasty, Coronary Atherectomy, Coronary Stent, Intracoronary Thrombolytic therapy, Antiplatelet therapy and/or Intravascular Ultrasound on [facility]   2. I recognize that during the surgical operation/procedure, unforeseen conditions may necessitate additional or different procedures than those listed above. I, therefore, further authorize and request that the above-named surgeon, assistants, or designees perform such procedures as are, in their judgment, necessary and desirable. 3.   My surgeon/physician has discussed prior to my surgery the potential benefits, risks and side effects of this procedure; the likelihood of achieving goals; and potential problems that might occur during recuperation. They also discussed reasonable alternatives to the procedure, including risks, benefits, and side effects related to the alternatives and risks related to not receiving this procedure. I have had all my questions answered and I acknowledge that no guarantee has been made as to the result that may be obtained. 4.   Should the need arise during my operation/procedure, which includes change of level of care prior to discharge, I also consent to the administration of blood and/or blood products.   Further, I understand that despite careful testing and screening of blood or blood products by collecting agencies, I may still be subject to ill effects as a result of receiving a blood transfusion and/or blood products. The following are some, but not all, of the potential risks that can occur: fever and allergic reactions, hemolytic reactions, transmission of diseases such as Hepatitis, AIDS and Cytomegalovirus (CMV) and fluid overload. In the event that I wish to have an autologous transfusion of my own blood, or a directed donor transfusion, I will discuss this with my physician. Check only if Refusing Blood or Blood Products  I understand refusal of blood or blood products as deemed necessary by my physician may have serious consequences to my condition to include possible death. I hereby assume responsibility for my refusal and release the hospital, its personnel, and my physicians from any responsibility for the consequences of my refusal.          o  Refuse      5. I authorize the use of any specimen, organs, tissues, body parts or foreign objects that may be removed from my body during the operation/procedure for diagnosis, research or teaching purposes and their subsequent disposal by hospital authorities. I also authorize the release of specimen test results and/or written reports to my treating physician on the hospital medical staff or other referring or consulting physicians involved in my care, at the discretion of the Pathologist or my treating physician. 6.   I consent to the photographing or videotaping of the operations or procedures to be performed, including appropriate portions of my body for medical, scientific, or educational purposes, provided my identity is not revealed by the pictures or by descriptive texts accompanying them. If the procedure has been photographed/videotaped, the surgeon will obtain the original picture, image, videotape or CD.   The hospital will not be responsible for storage, release or maintenance of the picture, image, tape or CD.    7.   I consent to the presence of a  or observers in the operating room as deemed necessary by my physician or their designees. 8.   I recognize that in the event my procedure results in extended X-Ray/fluoroscopy time, I may develop a skin reaction. 9. If I have a Do Not Attempt Resuscitation (DNAR) order in place, that status will be suspended while in the operating room, procedural suite, and during the recovery period unless otherwise explicitly stated by me (or a person authorized to consent on my behalf). The surgeon or my attending physician will determine when the applicable recovery period ends for purposes of reinstating the DNAR order. 10. Patients having a sterilization procedure: I understand that if the procedure is successful the results will be permanent and it will therefore be impossible for me to inseminate, conceive, or bear children. I also understand that the procedure is intended to result in sterility, although the result has not been guaranteed. 11. I acknowledge that my physician has explained sedation/analgesia administration to me including the risk and benefits I consent to the administration of sedation/analgesia as may be necessary or desirable in the judgment of my physician.     I CERTIFY THAT I HAVE READ AND FULLY UNDERSTAND THE ABOVE CONSENT TO OPERATION and/or OTHER PROCEDURE.        ____________________________________       _________________________________      ______________________________  Signature of Patient         Signature of Responsible Person        Printed Name of Responsible Person    ____________________________________      _________________________________      ______________________________       Signature of Witness          Relationship to Patient                       Date                                       Time  Patient Name: Tima Simpson     : 1945                 Printed: 2023      Medical Record #: [id number] Page 1 of 2